# Patient Record
Sex: FEMALE | Race: WHITE | NOT HISPANIC OR LATINO | Employment: OTHER | URBAN - METROPOLITAN AREA
[De-identification: names, ages, dates, MRNs, and addresses within clinical notes are randomized per-mention and may not be internally consistent; named-entity substitution may affect disease eponyms.]

---

## 2017-01-20 ENCOUNTER — APPOINTMENT (OUTPATIENT)
Dept: CT IMAGING | Facility: HOSPITAL | Age: 67
End: 2017-01-20

## 2017-01-20 ENCOUNTER — HOSPITAL ENCOUNTER (EMERGENCY)
Facility: HOSPITAL | Age: 67
Discharge: HOME | End: 2017-01-20
Attending: EMERGENCY MEDICINE | Admitting: EMERGENCY MEDICINE

## 2017-01-20 VITALS
SYSTOLIC BLOOD PRESSURE: 113 MMHG | BODY MASS INDEX: 20.8 KG/M2 | HEIGHT: 62 IN | WEIGHT: 113 LBS | DIASTOLIC BLOOD PRESSURE: 71 MMHG | OXYGEN SATURATION: 97 % | RESPIRATION RATE: 12 BRPM | HEART RATE: 62 BPM | TEMPERATURE: 98.7 F

## 2017-01-20 DIAGNOSIS — R42 VERTIGO: Primary | ICD-10-CM

## 2017-01-20 LAB
ANION GAP SERPL CALCULATED.3IONS-SCNC: 14.6 MMOL/L
BASOPHILS # BLD AUTO: 0.03 10*3/MM3 (ref 0–0.2)
BASOPHILS NFR BLD AUTO: 0.3 % (ref 0–2)
BILIRUB UR QL STRIP: NEGATIVE
BUN BLD-MCNC: 14 MG/DL (ref 8–23)
BUN/CREAT SERPL: 21.2 (ref 7–25)
CALCIUM SPEC-SCNC: 9.6 MG/DL (ref 8.8–10.5)
CHLORIDE SERPL-SCNC: 104 MMOL/L (ref 98–107)
CLARITY UR: CLEAR
CO2 SERPL-SCNC: 22.4 MMOL/L (ref 22–29)
COLOR UR: YELLOW
CREAT BLD-MCNC: 0.66 MG/DL (ref 0.57–1)
DEPRECATED RDW RBC AUTO: 42.5 FL (ref 37–54)
EOSINOPHIL # BLD AUTO: 0.01 10*3/MM3 (ref 0.1–0.3)
EOSINOPHIL NFR BLD AUTO: 0.1 % (ref 0–4)
ERYTHROCYTE [DISTWIDTH] IN BLOOD BY AUTOMATED COUNT: 13 % (ref 11.5–14.5)
GFR SERPL CREATININE-BSD FRML MDRD: 89 ML/MIN/1.73
GLUCOSE BLD-MCNC: 103 MG/DL (ref 65–99)
GLUCOSE UR STRIP-MCNC: NEGATIVE MG/DL
HCT VFR BLD AUTO: 42.4 % (ref 37–47)
HGB BLD-MCNC: 13.7 G/DL (ref 12–16)
HGB UR QL STRIP.AUTO: NEGATIVE
IMM GRANULOCYTES # BLD: 0.02 10*3/MM3 (ref 0–0.03)
IMM GRANULOCYTES NFR BLD: 0.2 % (ref 0–0.5)
KETONES UR QL STRIP: ABNORMAL
LEUKOCYTE ESTERASE UR QL STRIP.AUTO: NEGATIVE
LYMPHOCYTES # BLD AUTO: 1.42 10*3/MM3 (ref 0.6–4.8)
LYMPHOCYTES NFR BLD AUTO: 15.2 % (ref 20–45)
MCH RBC QN AUTO: 28.8 PG (ref 27–31)
MCHC RBC AUTO-ENTMCNC: 32.3 G/DL (ref 31–37)
MCV RBC AUTO: 89.3 FL (ref 81–99)
MONOCYTES # BLD AUTO: 0.43 10*3/MM3 (ref 0–1)
MONOCYTES NFR BLD AUTO: 4.6 % (ref 3–8)
NEUTROPHILS # BLD AUTO: 7.44 10*3/MM3 (ref 1.5–8.3)
NEUTROPHILS NFR BLD AUTO: 79.6 % (ref 45–70)
NITRITE UR QL STRIP: NEGATIVE
NRBC BLD MANUAL-RTO: 0 /100 WBC (ref 0–0)
PH UR STRIP.AUTO: 8.5 [PH] (ref 4.5–8)
PLATELET # BLD AUTO: 238 10*3/MM3 (ref 140–500)
PMV BLD AUTO: 10.9 FL (ref 7.4–10.4)
POTASSIUM BLD-SCNC: 3.7 MMOL/L (ref 3.5–5.2)
PROT UR QL STRIP: NEGATIVE
RBC # BLD AUTO: 4.75 10*6/MM3 (ref 4.2–5.4)
SODIUM BLD-SCNC: 141 MMOL/L (ref 136–145)
SP GR UR STRIP: 1.01 (ref 1–1.03)
UROBILINOGEN UR QL STRIP: ABNORMAL
WBC NRBC COR # BLD: 9.35 10*3/MM3 (ref 4.8–10.8)

## 2017-01-20 PROCEDURE — 99284 EMERGENCY DEPT VISIT MOD MDM: CPT

## 2017-01-20 PROCEDURE — 81003 URINALYSIS AUTO W/O SCOPE: CPT | Performed by: EMERGENCY MEDICINE

## 2017-01-20 PROCEDURE — 25010000002 ONDANSETRON PER 1 MG: Performed by: EMERGENCY MEDICINE

## 2017-01-20 PROCEDURE — 25010000002 PROMETHAZINE PER 50 MG: Performed by: EMERGENCY MEDICINE

## 2017-01-20 PROCEDURE — 99284 EMERGENCY DEPT VISIT MOD MDM: CPT | Performed by: EMERGENCY MEDICINE

## 2017-01-20 PROCEDURE — 96374 THER/PROPH/DIAG INJ IV PUSH: CPT

## 2017-01-20 PROCEDURE — 70450 CT HEAD/BRAIN W/O DYE: CPT

## 2017-01-20 PROCEDURE — 80048 BASIC METABOLIC PNL TOTAL CA: CPT | Performed by: EMERGENCY MEDICINE

## 2017-01-20 PROCEDURE — 96375 TX/PRO/DX INJ NEW DRUG ADDON: CPT

## 2017-01-20 PROCEDURE — 85025 COMPLETE CBC W/AUTO DIFF WBC: CPT | Performed by: EMERGENCY MEDICINE

## 2017-01-20 PROCEDURE — 96361 HYDRATE IV INFUSION ADD-ON: CPT

## 2017-01-20 RX ORDER — LEVOCETIRIZINE DIHYDROCHLORIDE 5 MG/1
5 TABLET, FILM COATED ORAL EVERY EVENING
COMMUNITY
End: 2019-12-23

## 2017-01-20 RX ORDER — LEVOTHYROXINE SODIUM 0.05 MG/1
50 TABLET ORAL DAILY
COMMUNITY

## 2017-01-20 RX ORDER — MECLIZINE HYDROCHLORIDE 25 MG/1
TABLET ORAL
Qty: 40 TABLET | Refills: 0 | Status: SHIPPED | OUTPATIENT
Start: 2017-01-20 | End: 2019-12-23

## 2017-01-20 RX ORDER — MECLIZINE HYDROCHLORIDE 25 MG/1
50 TABLET ORAL ONCE
Status: COMPLETED | OUTPATIENT
Start: 2017-01-20 | End: 2017-01-20

## 2017-01-20 RX ORDER — ONDANSETRON 2 MG/ML
4 INJECTION INTRAMUSCULAR; INTRAVENOUS ONCE
Status: COMPLETED | OUTPATIENT
Start: 2017-01-20 | End: 2017-01-20

## 2017-01-20 RX ORDER — PROMETHAZINE HYDROCHLORIDE 25 MG/ML
6.25 INJECTION, SOLUTION INTRAMUSCULAR; INTRAVENOUS ONCE
Status: COMPLETED | OUTPATIENT
Start: 2017-01-20 | End: 2017-01-20

## 2017-01-20 RX ORDER — SODIUM CHLORIDE 0.9 % (FLUSH) 0.9 %
10 SYRINGE (ML) INJECTION AS NEEDED
Status: DISCONTINUED | OUTPATIENT
Start: 2017-01-20 | End: 2017-01-21 | Stop reason: HOSPADM

## 2017-01-20 RX ORDER — PROMETHAZINE HYDROCHLORIDE 12.5 MG/1
12.5 TABLET ORAL EVERY 6 HOURS PRN
Qty: 30 TABLET | Refills: 0 | Status: ON HOLD | OUTPATIENT
Start: 2017-01-20 | End: 2019-04-24 | Stop reason: SDUPTHER

## 2017-01-20 RX ADMIN — ONDANSETRON 4 MG: 2 INJECTION, SOLUTION INTRAMUSCULAR; INTRAVENOUS at 19:36

## 2017-01-20 RX ADMIN — SODIUM CHLORIDE 1000 ML: 900 INJECTION, SOLUTION INTRAVENOUS at 19:35

## 2017-01-20 RX ADMIN — PROMETHAZINE HYDROCHLORIDE 6.25 MG: 25 INJECTION INTRAMUSCULAR; INTRAVENOUS at 20:11

## 2017-01-20 RX ADMIN — MECLIZINE HYDROCHLORIDE 50 MG: 25 TABLET ORAL at 20:45

## 2017-01-21 NOTE — ED PROVIDER NOTES
Subjective   Patient is a 66 y.o. female presenting with dizziness.   History provided by:  Patient  Dizziness   Quality:  Head spinning, room spinning and vertigo  Severity:  Moderate  Onset quality:  Sudden  Timing:  Constant  Progression:  Unchanged  Chronicity:  Recurrent  Context: eye movement and head movement    Context: not with loss of consciousness    Relieved by:  Nothing  Worsened by:  Turning head, movement and eye movement  Ineffective treatments:  Being still (and meclizine)  Associated symptoms: nausea and vomiting    Associated symptoms: no chest pain, no headaches, no hearing loss, no palpitations, no shortness of breath, no syncope, no tinnitus, no vision changes and no weakness      HPI Narrative:Ms. Kennedy is a 6-year-old white female who presents secondary to vertigo.  Patient reports onset 3 this morning.  She began vomiting approximately 5 AM.  She took meclizine 25 mg at 5 AM.  She took a second dose at noon however she vomited shortly after the second dose.  Patient has vomited several times today.  She reports she feels as if both she is spinning and the room is spinning.  Patient has a history of recurrent vertigo.  She is under the care of Dr. Avina-ENT.  No etiology has ever been found for her vertigo.  Patient presents for evaluation.        Review of Systems   Constitutional: Negative.  Negative for appetite change, diaphoresis and fever.   HENT: Negative.  Negative for hearing loss and tinnitus.    Eyes: Negative.    Respiratory: Negative for cough, chest tightness, shortness of breath and wheezing.    Cardiovascular: Negative for chest pain, palpitations, leg swelling and syncope.   Gastrointestinal: Positive for nausea and vomiting.   Genitourinary: Negative.  Negative for difficulty urinating, flank pain, frequency and hematuria.   Musculoskeletal: Negative.    Skin: Negative.  Negative for color change, pallor and rash.   Neurological: Positive for dizziness. Negative for  seizures, syncope, weakness and headaches.   Psychiatric/Behavioral: Negative.  Negative for agitation, behavioral problems and hallucinations.       Past Medical History   Diagnosis Date   • Abnormal vaginal Pap smear 09/10/1999     ascus   • Abnormal vaginal Pap smear 11/06/2000     lgsil   • Disease of thyroid gland    • History of colposcopy 10/11/1999     chronic endocervicitis   • Osteoporosis        Allergies   Allergen Reactions   • Cefdinir Hives       Past Surgical History   Procedure Laterality Date   • Tubal abdominal ligation     • Breast cyst incision and drainage  12/1996, 1997       Family History   Problem Relation Age of Onset   • Cancer Mother        Social History     Social History   • Marital status:      Spouse name: N/A   • Number of children: N/A   • Years of education: N/A     Social History Main Topics   • Smoking status: Never Smoker   • Smokeless tobacco: None   • Alcohol use No   • Drug use: No   • Sexual activity: No     Other Topics Concern   • None     Social History Narrative           Objective   Physical Exam   Constitutional: She is oriented to person, place, and time. She appears well-developed and well-nourished. No distress.   HENT:   Head: Normocephalic and atraumatic.   Right Ear: External ear normal.   Left Ear: External ear normal.   Nose: Nose normal.   Mouth/Throat: Oropharynx is clear and moist.   Eyes: Conjunctivae and EOM are normal. Pupils are equal, round, and reactive to light.   Neck: Normal range of motion. Neck supple.   Cardiovascular: Normal rate, regular rhythm, normal heart sounds and intact distal pulses.  Exam reveals no gallop and no friction rub.    No murmur heard.  Pulmonary/Chest: Effort normal and breath sounds normal.   Abdominal: Soft. Bowel sounds are normal. She exhibits no distension. There is no tenderness.   Musculoskeletal: Normal range of motion.   Neurological: She is alert and oriented to person, place, and time.   Skin: Skin is  warm and dry. She is not diaphoretic.   Psychiatric: She has a normal mood and affect. Her behavior is normal.   Nursing note and vitals reviewed.      Procedures         ED Course  ED Course   Comment By Time   01/20/17  8:44 PM  The patient reports nausea now improved.  She responded well to Phenergan.  No improvement with Zofran.  She is now laying on her right side.  Again this is an improvement from her arrival.  Laboratories are unremarkable.  Patient reports she has never had a CT of her head despite 6-8 episodes of vertigo over the years.  I will obtain one tonight. Alfonso Marcus MD 01/20 2044 01/20/17  11:23 PM  CT is unremarkable.  Patient feeling much better.  Responded well to Phenergan and meclizine.  Will prescribe those for home. Alfonso Marcus MD 01/20 2323 01/20/17  7:17 PM  The patient has worsening of symptoms with eye movement.  This is even more pronounced with movement of her head.  Ordering baseline labs and UA.  Will give Zofran and meclizine.   Alfonso Marcus MD 01/21 0018      Labs Reviewed   BASIC METABOLIC PANEL - Abnormal; Notable for the following:        Result Value    Glucose 103 (*)     All other components within normal limits    Narrative:     GFR Normal >60  Chronic Kidney Disease <60  Kidney Failure <15   CBC WITH AUTO DIFFERENTIAL - Abnormal; Notable for the following:     MPV 10.9 (*)     Neutrophil % 79.6 (*)     Lymphocyte % 15.2 (*)     Eosinophils, Absolute 0.01 (*)     All other components within normal limits   URINALYSIS W/ CULTURE IF INDICATED - Abnormal; Notable for the following:     pH, UA 8.5 (*)     Ketones, UA 40 mg/dL (2+) (*)     All other components within normal limits    Narrative:     Urine microscopic not indicated.   CBC AND DIFFERENTIAL    Narrative:     The following orders were created for panel order CBC & Differential.  Procedure                               Abnormality         Status                     ---------                                -----------         ------                     CBC Auto Differential[[id number]]         Abnormal            Final result                 Please view results for these tests on the individual orders.             MDM  Number of Diagnoses or Management Options  Vertigo: established and worsening     Amount and/or Complexity of Data Reviewed  Clinical lab tests: ordered and reviewed  Tests in the radiology section of CPT®: ordered and reviewed    Risk of Complications, Morbidity, and/or Mortality  Presenting problems: moderate  Diagnostic procedures: moderate  Management options: moderate    Patient Progress  Patient progress: stable      Final diagnoses:   Vertigo            Alfonso Marcus MD  01/21/17 0019

## 2017-01-21 NOTE — ED NOTES
"Patient up to [facility].  States was able to get up without vomiting.  Dizziness is \"just a tad better\".     Gris Montgomery RN  01/20/17 1308    "

## 2017-01-21 NOTE — ED NOTES
Patient sat up at bedside.  No nausea or vomiting.  States is still dizzy but patient is able to open her eyes and move her head without vomiting.  Gait steady when patient walked to wheelchair for discharge.     Gris Montgomery RN  01/21/17 0009

## 2017-01-21 NOTE — DISCHARGE INSTRUCTIONS
Education as directed.  Recommended take meclizine 4 times daily through the weekend.  Decreased frequency Monday if symptoms aren't improved.  Follow-up with Dr. Michael and/or Dr. Avina as above.  Return to ED for worsening symptoms, medical emergencies.

## 2017-05-12 ENCOUNTER — HOSPITAL ENCOUNTER (OUTPATIENT)
Dept: GENERAL RADIOLOGY | Facility: HOSPITAL | Age: 67
Discharge: HOME | End: 2017-05-12
Attending: INTERNAL MEDICINE | Admitting: INTERNAL MEDICINE

## 2017-05-12 ENCOUNTER — TRANSCRIBE ORDERS (OUTPATIENT)
Dept: ADMINISTRATIVE | Facility: HOSPITAL | Age: 67
End: 2017-05-12

## 2017-05-12 DIAGNOSIS — M25.562 LEFT KNEE PAIN, UNSPECIFIED CHRONICITY: ICD-10-CM

## 2017-05-12 DIAGNOSIS — M25.562 LEFT KNEE PAIN, UNSPECIFIED CHRONICITY: Primary | ICD-10-CM

## 2017-05-12 PROCEDURE — 73560 X-RAY EXAM OF KNEE 1 OR 2: CPT

## 2017-09-06 ENCOUNTER — TRANSCRIBE ORDERS (OUTPATIENT)
Dept: ADMINISTRATIVE | Facility: HOSPITAL | Age: 67
End: 2017-09-06

## 2017-09-06 DIAGNOSIS — R10.9 STOMACH ACHE: Primary | ICD-10-CM

## 2017-09-06 DIAGNOSIS — Z12.31 VISIT FOR SCREENING MAMMOGRAM: ICD-10-CM

## 2017-09-15 ENCOUNTER — APPOINTMENT (OUTPATIENT)
Dept: MAMMOGRAPHY | Facility: HOSPITAL | Age: 67
End: 2017-09-15

## 2017-09-15 ENCOUNTER — APPOINTMENT (OUTPATIENT)
Dept: ULTRASOUND IMAGING | Facility: HOSPITAL | Age: 67
End: 2017-09-15

## 2018-03-01 ENCOUNTER — TRANSCRIBE ORDERS (OUTPATIENT)
Dept: ADMINISTRATIVE | Facility: HOSPITAL | Age: 68
End: 2018-03-01

## 2018-03-01 DIAGNOSIS — Z12.31 SCREENING MAMMOGRAM, ENCOUNTER FOR: Primary | ICD-10-CM

## 2018-03-01 DIAGNOSIS — Z78.0 POSTMENOPAUSAL: Primary | ICD-10-CM

## 2018-03-09 ENCOUNTER — OFFICE VISIT (OUTPATIENT)
Dept: ORTHOPEDIC SURGERY | Facility: CLINIC | Age: 68
End: 2018-03-09

## 2018-03-09 VITALS
HEART RATE: 76 BPM | WEIGHT: 120 LBS | HEIGHT: 62 IN | DIASTOLIC BLOOD PRESSURE: 77 MMHG | SYSTOLIC BLOOD PRESSURE: 114 MMHG | BODY MASS INDEX: 22.08 KG/M2

## 2018-03-09 DIAGNOSIS — M17.12 PRIMARY OSTEOARTHRITIS OF LEFT KNEE: Primary | ICD-10-CM

## 2018-03-09 PROCEDURE — 20610 DRAIN/INJ JOINT/BURSA W/O US: CPT | Performed by: ORTHOPAEDIC SURGERY

## 2018-03-09 PROCEDURE — 99204 OFFICE O/P NEW MOD 45 MIN: CPT | Performed by: ORTHOPAEDIC SURGERY

## 2018-03-09 RX ORDER — IBUPROFEN 800 MG/1
TABLET ORAL
COMMUNITY
Start: 2017-11-30 | End: 2018-08-23

## 2018-03-09 RX ORDER — AZELASTINE 1 MG/ML
1 SPRAY, METERED NASAL AS NEEDED
COMMUNITY
Start: 2018-02-16 | End: 2019-12-23

## 2018-03-09 RX ADMIN — TRIAMCINOLONE ACETONIDE 80 MG: 40 INJECTION, SUSPENSION INTRA-ARTICULAR; INTRAMUSCULAR at 10:51

## 2018-03-09 RX ADMIN — LIDOCAINE HYDROCHLORIDE 8 ML: 10 INJECTION, SOLUTION EPIDURAL; INFILTRATION; INTRACAUDAL; PERINEURAL at 10:51

## 2018-03-09 NOTE — PROGRESS NOTES
Subjective:     Patient ID: Nikkie Kennedy is a 67 y.o. female.    Chief Complaint:  Left knee pain  History of Present Illness  Nikkie Kennedy presents to clinic today for evaluation of left knee pain, states the pain is been present for approximately 2 years ago particular worse over the last 6 months.  Denies any specific injury prior to the onset of her pain initially.  Pain at this point time is increase primarily with stair climbing as well as treadmill activities.  She has noted occasional catching sensation as well as moderate intermittent swelling, localizes majority the pain to the medial aspect of her left knee with moderate pain over anterior knee as well.  Minimal to no improvement with use of ibuprofen.  She did have one prior cortisone injection greater than 6 months ago that does help.  Rates pain as a 7 to a 10 out of 10, aching in nature, occasional sharp pain appreciated.  Denies any radiation of her pain, denies associated numbness or tingling to left lower extremity.     Social History     Occupational History   • Not on file.     Social History Main Topics   • Smoking status: Never Smoker   • Smokeless tobacco: Never Used   • Alcohol use No   • Drug use: No   • Sexual activity: No      Past Medical History:   Diagnosis Date   • Abnormal vaginal Pap smear 09/10/1999    ascus   • Abnormal vaginal Pap smear 11/06/2000    lgsil   • Asthma    • Disease of thyroid gland    • History of colposcopy 10/11/1999    chronic endocervicitis   • Osteoporosis      Past Surgical History:   Procedure Laterality Date   • BREAST CYST ASPIRATION Right     20 yrs ago   • BREAST CYST INCISION AND DRAINAGE  12/1996, 1997   • CHOLECYSTECTOMY  03/2016   • TUBAL ABDOMINAL LIGATION     • VARICOSE VEIN SURGERY  2015    Pt states she had varicose veins removed on both legs (2-3 years ago).       Family History   Problem Relation Age of Onset   • Cancer Mother    • Breast cancer Neg Hx          Review of Systems  "  Constitutional: Negative for chills, diaphoresis, fever and unexpected weight change.   HENT: Positive for hearing loss. Negative for nosebleeds, sore throat and tinnitus.    Eyes: Negative for pain and visual disturbance.   Respiratory: Negative for cough, shortness of breath and wheezing.    Cardiovascular: Negative for chest pain and palpitations.   Gastrointestinal: Negative for abdominal pain, diarrhea, nausea and vomiting.   Endocrine: Negative for cold intolerance, heat intolerance and polydipsia.   Genitourinary: Negative for difficulty urinating, dysuria and hematuria.   Musculoskeletal: Positive for arthralgias and joint swelling. Negative for myalgias.   Skin: Negative for rash and wound.   Allergic/Immunologic: Positive for environmental allergies.   Neurological: Positive for dizziness. Negative for syncope and numbness.   Hematological: Does not bruise/bleed easily.   Psychiatric/Behavioral: Negative for dysphoric mood and sleep disturbance. The patient is not nervous/anxious.    All other systems reviewed and are negative.          Objective:  Vitals:    03/09/18 0955   BP: 114/77   Pulse: 76   Weight: 54.4 kg (120 lb)   Height: 157.5 cm (62\")     1    03/09/18  0955   Weight: 54.4 kg (120 lb)     Body mass index is 21.95 kg/m².  Physical Exam    Vital signs reviewed.   General: No acute distress.  Eyes: conjunctiva clear; pupils equally round and reactive  ENT: external ears and nose atraumatic; oropharynx clear  CV: no peripheral edema  Resp: normal respiratory effort  Skin: no rashes or wounds; normal turgor  Psych: mood and affect appropriate; recent and remote memory intact       Ortho Exam    Left knee-active range of motion 0-130°, 4+ out of 5 strength on flexion and extension, moderate effusion noted, maximal tenderness palpation along medial joint line as well as medial lateral patellar facet, positive active patellar compression test.  Moderately positive Haley exam with pain, no click. "  Grade 1A Lachman, negative anterior posterior drawer, stable to varus and valgus stress 0 and 30 degrees.  No left hip pain on logroll or Stinchfield exam, negative straight leg raise left lower extremity.  Brisk cap refill all digits, 2+ dorsalis is pulse left foot.  Positive sensation light touch all distributions left foot symmetric to the right.    Imaging:  Review of outside x-rays left knee from May 2017 as well as radiology report indicates moderate medial and patellofemoral compartment joint space narrowing, no significant osteophyte changes noted, no evidence of fracture, dislocation, or subluxation, no evidence of radiopaque intra-articular loose body.    Assessment:       1. Primary osteoarthritis of left knee          Plan:          Discussed treatment options at length with patient at today's visit. As reviewing options at length including not limited to activity modification, physical therapy, bracing, prescription anti-inflammatory medication, and injection, patient decided to proceed with injection today as well as home exercises.  I will follow up in 6 weeks to review for improvement, recommend repeating weightbearing x-rays in our office at that time.    Nikkie Kennedy was in agreement with plan and had all questions answered.     Orders:  Orders Placed This Encounter   Procedures   • Large Joint Arthrocentesis   • Ambulatory Referral to Physical Therapy Evaluate and treat, Ortho       Medications:  No orders of the defined types were placed in this encounter.      Followup:  Return in about 6 weeks (around 4/20/2018).    Nikkie was seen today for pain and edema.    Diagnoses and all orders for this visit:    Primary osteoarthritis of left knee  -     Ambulatory Referral to Physical Therapy Evaluate and treat, Ortho  -     Large Joint Arthrocentesis          Dictated utilizing Dragon dictation     Large Joint Arthrocentesis  Date/Time: 3/9/2018 10:51 AM  Consent given by: patient  Site marked:  site marked  Timeout: Immediately prior to procedure a time out was called to verify the correct patient, procedure, equipment, support staff and site/side marked as required   Supporting Documentation  Indications: pain   Procedure Details  Location: knee - L knee  Preparation: Patient was prepped and draped in the usual sterile fashion  Needle size: 22 G  Approach: anterolateral  Medications administered: 8 mL lidocaine PF 1% 1 %; 80 mg triamcinolone acetonide 40 MG/ML  Patient tolerance: patient tolerated the procedure well with no immediate complications

## 2018-03-14 ENCOUNTER — HOSPITAL ENCOUNTER (OUTPATIENT)
Dept: MAMMOGRAPHY | Facility: HOSPITAL | Age: 68
Discharge: HOME | End: 2018-03-14
Admitting: NURSE PRACTITIONER

## 2018-03-14 ENCOUNTER — APPOINTMENT (OUTPATIENT)
Dept: BONE DENSITY | Facility: HOSPITAL | Age: 68
End: 2018-03-14

## 2018-03-14 DIAGNOSIS — Z78.0 POSTMENOPAUSAL: ICD-10-CM

## 2018-03-14 DIAGNOSIS — Z12.31 SCREENING MAMMOGRAM, ENCOUNTER FOR: ICD-10-CM

## 2018-03-14 PROCEDURE — 77063 BREAST TOMOSYNTHESIS BI: CPT

## 2018-03-14 PROCEDURE — 77080 DXA BONE DENSITY AXIAL: CPT

## 2018-03-14 PROCEDURE — 77067 SCR MAMMO BI INCL CAD: CPT

## 2018-03-22 RX ORDER — TRIAMCINOLONE ACETONIDE 40 MG/ML
80 INJECTION, SUSPENSION INTRA-ARTICULAR; INTRAMUSCULAR
Status: COMPLETED | OUTPATIENT
Start: 2018-03-09 | End: 2018-03-09

## 2018-03-22 RX ORDER — LIDOCAINE HYDROCHLORIDE 10 MG/ML
8 INJECTION, SOLUTION EPIDURAL; INFILTRATION; INTRACAUDAL; PERINEURAL
Status: COMPLETED | OUTPATIENT
Start: 2018-03-09 | End: 2018-03-09

## 2018-04-16 ENCOUNTER — OFFICE VISIT (OUTPATIENT)
Dept: OBSTETRICS AND GYNECOLOGY | Facility: CLINIC | Age: 68
End: 2018-04-16

## 2018-04-16 VITALS
BODY MASS INDEX: 22.63 KG/M2 | SYSTOLIC BLOOD PRESSURE: 122 MMHG | WEIGHT: 123 LBS | HEIGHT: 62 IN | DIASTOLIC BLOOD PRESSURE: 78 MMHG

## 2018-04-16 DIAGNOSIS — M81.8 OTHER OSTEOPOROSIS, UNSPECIFIED PATHOLOGICAL FRACTURE PRESENCE: ICD-10-CM

## 2018-04-16 DIAGNOSIS — Z13.9 SCREENING FOR CONDITION: Primary | ICD-10-CM

## 2018-04-16 DIAGNOSIS — Z01.419 PAP SMEAR, LOW-RISK: ICD-10-CM

## 2018-04-16 DIAGNOSIS — Z11.51 SPECIAL SCREENING EXAMINATION FOR HUMAN PAPILLOMAVIRUS (HPV): ICD-10-CM

## 2018-04-16 DIAGNOSIS — N94.2 VAGINISMUS: ICD-10-CM

## 2018-04-16 DIAGNOSIS — N95.2 VAGINAL ATROPHY: ICD-10-CM

## 2018-04-16 LAB
BILIRUB BLD-MCNC: NEGATIVE MG/DL
CLARITY, POC: CLEAR
COLOR UR: YELLOW
GLUCOSE UR STRIP-MCNC: NEGATIVE MG/DL
KETONES UR QL: NEGATIVE
LEUKOCYTE EST, POC: NEGATIVE
NITRITE UR-MCNC: NEGATIVE MG/ML
PH UR: 5 [PH] (ref 5–8)
PROT UR STRIP-MCNC: NEGATIVE MG/DL
RBC # UR STRIP: NEGATIVE /UL
SP GR UR: 1.02 (ref 1–1.03)
UROBILINOGEN UR QL: NORMAL

## 2018-04-16 PROCEDURE — 99213 OFFICE O/P EST LOW 20 MIN: CPT | Performed by: OBSTETRICS & GYNECOLOGY

## 2018-04-16 PROCEDURE — 81002 URINALYSIS NONAUTO W/O SCOPE: CPT | Performed by: OBSTETRICS & GYNECOLOGY

## 2018-04-16 PROCEDURE — G0101 CA SCREEN;PELVIC/BREAST EXAM: HCPCS | Performed by: OBSTETRICS & GYNECOLOGY

## 2018-04-16 RX ORDER — ALENDRONATE SODIUM 70 MG/1
TABLET ORAL
COMMUNITY
Start: 2018-04-15 | End: 2019-04-09

## 2018-04-16 RX ORDER — NITROFURANTOIN MACROCRYSTALS 100 MG/1
CAPSULE ORAL
COMMUNITY
Start: 2018-03-14 | End: 2018-10-05

## 2018-04-16 NOTE — PROGRESS NOTES
GYN Annual Exam     CC- Here for annual exam.     Nikkie Kennedy is a 67 y.o. female new patient who presents for annual well woman exam. She underwent Menopause in her mid 50s and took HRT x 10 years, none now. She has seen Courtney Zaidi for painful bladder and had bladder instillations and they were helpful. She is not SA due to her 's medical condition. No  VB. She just started on Fosamax for osteoporosis.       OB History      Para Term  AB Living    2 2 0   2    SAB TAB Ectopic Molar Multiple Live Births                 Obstetric Comments    2           Menarche:1-  Menopause:mid 50s  HRT: x 10 years, none now  Current contraception: post menopausal status and tubal ligation  History of abnormal Pap smear: no  History of abnormal mammogram: yes - breast cyst drained  Family history of uterine, colon or ovarian cancer: yes - mom colon age 76  Family history of breast cancer: no  STD's: none    Health Maintenance   Topic Date Due   • TDAP/TD VACCINES (1 - Tdap) 1968   • PNEUMOCOCCAL VACCINES (65+ LOW/MEDIUM RISK) (1 of 2 - PCV13) 2014   • HEPATITIS C SCREENING  2018   • COLONOSCOPY  2018   • ZOSTER VACCINE  2018   • INFLUENZA VACCINE  2018   • MAMMOGRAM  2020   • DXA SCAN  2020       Past Medical History:   Diagnosis Date   • Abnormal vaginal Pap smear 09/10/1999    ascus   • Abnormal vaginal Pap smear 2000    lgsil   • Asthma    • Disease of thyroid gland    • History of colposcopy 10/11/1999    chronic endocervicitis   • Osteoporosis        Past Surgical History:   Procedure Laterality Date   • BREAST CYST ASPIRATION Right     20 yrs ago   • BREAST CYST INCISION AND DRAINAGE  1996,    • CHOLECYSTECTOMY  2016   • TUBAL ABDOMINAL LIGATION     • VARICOSE VEIN SURGERY      Pt states she had varicose veins removed on both legs (2-3 years ago).         Current Outpatient Prescriptions:   •  alendronate (FOSAMAX) 70 MG tablet, ,  Disp: , Rfl:   •  azelastine (ASTELIN) 0.1 % nasal spray, , Disp: , Rfl:   •  estradiol (ESTRACE) 0.1 MG/GM vaginal cream, Insert 1 g into the vagina 2 (Two) Times a Week., Disp: 45 g, Rfl: 6  •  ibuprofen (ADVIL,MOTRIN) 800 MG tablet, , Disp: , Rfl:   •  levocetirizine (XYZAL) 5 MG tablet, Take 5 mg by mouth Every Evening., Disp: , Rfl:   •  levothyroxine (SYNTHROID, LEVOTHROID) 50 MCG tablet, Take 50 mcg by mouth Daily., Disp: , Rfl:   •  meclizine (ANTIVERT) 25 MG tablet, 1-2 tablets every 6 hours when necessary dizziness/vertigo., Disp: 40 tablet, Rfl: 0  •  nitrofurantoin (MACRODANTIN) 100 MG capsule, , Disp: , Rfl:   •  promethazine (PHENERGAN) 12.5 MG tablet, Take 1 tablet by mouth Every 6 (Six) Hours As Needed for nausea or vomiting for up to 30 doses., Disp: 30 tablet, Rfl: 0    Allergies   Allergen Reactions   • Hydrocodone Nausea And Vomiting   • Cefdinir Hives       Social History   Substance Use Topics   • Smoking status: Never Smoker   • Smokeless tobacco: Never Used   • Alcohol use No       Family History   Problem Relation Age of Onset   • Cancer Mother    • Colon cancer Mother 76   • Clotting disorder Other      does not know name   • Deep vein thrombosis Other    • Breast cancer Neg Hx    • Ovarian cancer Neg Hx        Review of Systems   Constitutional: Negative for appetite change, fatigue, fever and unexpected weight change.   Respiratory: Negative for cough and shortness of breath.    Cardiovascular: Negative for chest pain and palpitations.   Gastrointestinal: Negative for abdominal distention, abdominal pain, constipation, diarrhea and nausea.   Endocrine: Negative for cold intolerance and heat intolerance.   Genitourinary: Negative for dyspareunia, dysuria, genital sores, hematuria, menstrual problem, pelvic pain, vaginal bleeding and vaginal discharge.   Musculoskeletal: Positive for joint swelling (L knee pain).   Skin: Negative for color change and rash.   Neurological: Negative for  "headaches.   Hematological: Negative for adenopathy. Does not bruise/bleed easily.   Psychiatric/Behavioral: Negative for dysphoric mood. The patient is not nervous/anxious.        /78   Ht 157.5 cm (62\")   Wt 55.8 kg (123 lb)   BMI 22.50 kg/m²     Physical Exam   Constitutional: She is oriented to person, place, and time. She appears well-developed and well-nourished.   HENT:   Head: Normocephalic and atraumatic.   Neck: Neck supple. No thyromegaly present.   Cardiovascular: Normal rate, regular rhythm and normal heart sounds.    Pulmonary/Chest: Effort normal and breath sounds normal. Right breast exhibits no inverted nipple, no mass, no nipple discharge, no skin change and no tenderness. Left breast exhibits no inverted nipple, no mass, no nipple discharge, no skin change and no tenderness.   Abdominal: Soft. Bowel sounds are normal. She exhibits no distension and no mass. There is no tenderness. There is no rebound and no guarding. No hernia.   Genitourinary:       Pelvic exam was performed with patient supine. There is no rash, tenderness or lesion on the right labia. There is no rash, tenderness or lesion on the left labia. Uterus is not deviated, not enlarged, not fixed and not tender. Cervix exhibits no motion tenderness, no discharge and no friability. Right adnexum displays no mass, no tenderness and no fullness. Left adnexum displays no mass, no tenderness and no fullness. No erythema or bleeding in the vagina. No foreign body in the vagina. No signs of injury around the vagina. No vaginal discharge found.   Genitourinary Comments: Urethral eversion noted  Moderate atrophy noted  LPS noted   Neurological: She is alert and oriented to person, place, and time.   Skin: Skin is warm and dry.   Psychiatric: She has a normal mood and affect. Her behavior is normal. Judgment and thought content normal.   Vitals reviewed.         Assessment/Plan    1) GYN HM: check pap/HPV   SBE demonstrated and " encouraged.  2) STD screening: declines Condoms encouraged.  3) Bone health - Weight bearing exercise, dietary calcium recommendations and vitamin D reviewed.   4) Diet and Exercise discussed  5) Smoking Status: non smoker  6) Social:   7)MMG:  UTD 3/2018 B1  8) DEXA- UTD 3/2018, on Fosamax for osteoporosis  9)C scope- UTD 2016, repeat 5 years  10) Urethral eversion and vaginal atrophy- rec Estrace cream. Patient counseled that vaginal estrogen rings, creams and tablets are available and highly effective at treating local vaginal symptoms such as atrophy and vaginal dryness.  Vaginal estrogen does not cause uterine overgrowth and does NOT require a progestogen to protect the uterus.  Very small amounts of estrogen are absorbed systemically.  For patients with a history of an estrogen dependent cancer such as breast cancer, the decision to use local estrogen for local vaginal symptoms should be made after consultation with her oncologist.  Possible side effects include local irritation or burning and/or vaginal bleeding and should always be reported.    11) Vaginismus- pt asymptomatic, declines PT   Follow up prn and 1 year       Nikkie was seen today for gynecologic exam.    Diagnoses and all orders for this visit:    Screening for condition  -     POC Urinalysis Dipstick    Pap smear, low-risk  -     Pap IG, HPV-hr    Special screening examination for human papillomavirus (HPV)  -     Pap IG, HPV-hr        Jazzmine Reyes MD  4/19/2018  10:40 PM

## 2018-04-19 PROBLEM — M81.0 OSTEOPOROSIS: Status: ACTIVE | Noted: 2018-04-19

## 2018-04-19 PROBLEM — N94.2 VAGINISMUS: Status: ACTIVE | Noted: 2018-04-19

## 2018-04-19 PROBLEM — E03.9 HYPOTHYROIDISM: Status: ACTIVE | Noted: 2018-04-19

## 2018-04-19 PROBLEM — N95.2 VAGINAL ATROPHY: Status: ACTIVE | Noted: 2018-04-19

## 2018-04-19 LAB
CYTOLOGIST CVX/VAG CYTO: NORMAL
CYTOLOGY CVX/VAG DOC THIN PREP: NORMAL
DX ICD CODE: NORMAL
HIV 1 & 2 AB SER-IMP: NORMAL
HPV I/H RISK 1 DNA CVX QL PROBE+SIG AMP: NEGATIVE
OTHER STN SPEC: NORMAL
PATH REPORT.FINAL DX SPEC: NORMAL
STAT OF ADQ CVX/VAG CYTO-IMP: NORMAL

## 2018-04-19 RX ORDER — ESTRADIOL 0.1 MG/G
1 CREAM VAGINAL 2 TIMES WEEKLY
Qty: 45 G | Refills: 6 | Status: SHIPPED | OUTPATIENT
Start: 2018-04-19 | End: 2019-12-23

## 2018-07-27 ENCOUNTER — OFFICE VISIT (OUTPATIENT)
Dept: ORTHOPEDIC SURGERY | Facility: CLINIC | Age: 68
End: 2018-07-27

## 2018-07-27 DIAGNOSIS — M17.12 PRIMARY OSTEOARTHRITIS OF LEFT KNEE: ICD-10-CM

## 2018-07-27 DIAGNOSIS — R52 PAIN: Primary | ICD-10-CM

## 2018-07-27 PROCEDURE — 99213 OFFICE O/P EST LOW 20 MIN: CPT | Performed by: ORTHOPAEDIC SURGERY

## 2018-07-27 PROCEDURE — 73562 X-RAY EXAM OF KNEE 3: CPT | Performed by: ORTHOPAEDIC SURGERY

## 2018-07-27 PROCEDURE — 20610 DRAIN/INJ JOINT/BURSA W/O US: CPT | Performed by: ORTHOPAEDIC SURGERY

## 2018-07-27 RX ORDER — MELOXICAM 7.5 MG/1
7.5 TABLET ORAL DAILY
Qty: 30 TABLET | Refills: 0 | Status: SHIPPED | OUTPATIENT
Start: 2018-07-27 | End: 2018-08-23 | Stop reason: SDUPTHER

## 2018-07-27 RX ORDER — CIPROFLOXACIN 500 MG/1
TABLET, FILM COATED ORAL EVERY 12 HOURS
COMMUNITY
End: 2019-04-09

## 2018-07-27 RX ADMIN — LIDOCAINE HYDROCHLORIDE 8 ML: 10 INJECTION, SOLUTION EPIDURAL; INFILTRATION; INTRACAUDAL; PERINEURAL at 09:27

## 2018-07-27 RX ADMIN — TRIAMCINOLONE ACETONIDE 80 MG: 40 INJECTION, SUSPENSION INTRA-ARTICULAR; INTRAMUSCULAR at 09:27

## 2018-08-06 RX ORDER — LIDOCAINE HYDROCHLORIDE 10 MG/ML
8 INJECTION, SOLUTION EPIDURAL; INFILTRATION; INTRACAUDAL; PERINEURAL
Status: COMPLETED | OUTPATIENT
Start: 2018-07-27 | End: 2018-07-27

## 2018-08-06 RX ORDER — TRIAMCINOLONE ACETONIDE 40 MG/ML
80 INJECTION, SUSPENSION INTRA-ARTICULAR; INTRAMUSCULAR
Status: COMPLETED | OUTPATIENT
Start: 2018-07-27 | End: 2018-07-27

## 2018-08-23 RX ORDER — MELOXICAM 7.5 MG/1
TABLET ORAL
Qty: 30 TABLET | Refills: 0 | Status: SHIPPED | OUTPATIENT
Start: 2018-08-23 | End: 2018-12-10 | Stop reason: SDUPTHER

## 2018-09-21 ENCOUNTER — CLINICAL SUPPORT (OUTPATIENT)
Dept: ORTHOPEDIC SURGERY | Facility: CLINIC | Age: 68
End: 2018-09-21

## 2018-09-21 VITALS — WEIGHT: 120 LBS | BODY MASS INDEX: 22.08 KG/M2 | HEIGHT: 62 IN

## 2018-09-21 DIAGNOSIS — M17.12 PRIMARY OSTEOARTHRITIS OF LEFT KNEE: ICD-10-CM

## 2018-09-21 PROCEDURE — 20610 DRAIN/INJ JOINT/BURSA W/O US: CPT | Performed by: NURSE PRACTITIONER

## 2018-09-21 NOTE — PROGRESS NOTES
Procedure   Large Joint Arthrocentesis  Date/Time: 9/21/2018 10:34 AM  Consent given by: patient  Site marked: site marked  Timeout: Immediately prior to procedure a time out was called to verify the correct patient, procedure, equipment, support staff and site/side marked as required   Supporting Documentation  Indications: pain   Procedure Details  Location: knee - L knee  Preparation: Patient was prepped and draped in the usual sterile fashion  Needle size: 22 G  Approach: anterolateral  Medications administered: 30 mg Hyaluronan 30 MG/2ML  Patient tolerance: patient tolerated the procedure well with no immediate complications      ORTHOVISC INJECTION PROVIDED VIA [facility].    Patient presented today for viscosupplement injection.  This is the first injection for the left knee.  We will see patient back in one week.  We reviewed risks benefits and alternatives of the procedure and patient wished to proceed today and had all questions answered.

## 2018-09-28 ENCOUNTER — CLINICAL SUPPORT (OUTPATIENT)
Dept: ORTHOPEDIC SURGERY | Facility: CLINIC | Age: 68
End: 2018-09-28

## 2018-09-28 DIAGNOSIS — M17.12 PRIMARY OSTEOARTHRITIS OF LEFT KNEE: ICD-10-CM

## 2018-09-28 DIAGNOSIS — R52 PAIN: Primary | ICD-10-CM

## 2018-09-28 PROCEDURE — 20610 DRAIN/INJ JOINT/BURSA W/O US: CPT | Performed by: NURSE PRACTITIONER

## 2018-09-28 NOTE — PROGRESS NOTES
Large Joint Arthrocentesis  Date/Time: 9/28/2018 10:45 AM  Consent given by: patient  Site marked: site marked  Timeout: Immediately prior to procedure a time out was called to verify the correct patient, procedure, equipment, support staff and site/side marked as required   Supporting Documentation  Indications: pain   Procedure Details  Location: knee - L knee  Needle size: 22 G  Medications administered: 30 mg Hyaluronan 30 MG/2ML  Patient tolerance: patient tolerated the procedure well with no immediate complications        Patient presented today for viscosupplement injection.  This is the second injection for the left knee.  We will see patient back in one week.  We reviewed risks benefits and alternatives of the procedure and patient wished to proceed today and had all questions answered.

## 2018-10-05 ENCOUNTER — CLINICAL SUPPORT (OUTPATIENT)
Dept: ORTHOPEDIC SURGERY | Facility: CLINIC | Age: 68
End: 2018-10-05

## 2018-10-05 VITALS — BODY MASS INDEX: 22.08 KG/M2 | WEIGHT: 120 LBS | HEIGHT: 62 IN

## 2018-10-05 DIAGNOSIS — M17.12 PRIMARY OSTEOARTHRITIS OF LEFT KNEE: Primary | ICD-10-CM

## 2018-10-05 PROCEDURE — 20610 DRAIN/INJ JOINT/BURSA W/O US: CPT | Performed by: NURSE PRACTITIONER

## 2018-10-05 RX ORDER — AZITHROMYCIN 250 MG/1
TABLET, FILM COATED ORAL
Qty: 5 TABLET | Refills: 2 | Status: SHIPPED | OUTPATIENT
Start: 2018-10-05 | End: 2019-04-09

## 2018-10-05 RX ORDER — FLUCONAZOLE 150 MG/1
150 TABLET ORAL ONCE
Qty: 1 TABLET | Refills: 1 | Status: SHIPPED | OUTPATIENT
Start: 2018-10-05 | End: 2018-10-05

## 2018-10-05 NOTE — PROGRESS NOTES
Procedure   Large Joint Arthrocentesis  Date/Time: 10/5/2018 10:30 AM  Consent given by: patient  Site marked: site marked  Timeout: Immediately prior to procedure a time out was called to verify the correct patient, procedure, equipment, support staff and site/side marked as required   Supporting Documentation  Indications: pain   Procedure Details  Location: knee - L knee  Preparation: Patient was prepped and draped in the usual sterile fashion  Needle size: 22 G  Approach: anterolateral  Medications administered: 30 mg Hyaluronan 30 MG/2ML  Patient tolerance: patient tolerated the procedure well with no immediate complications      [facility]      Patient presented today for viscosupplement injection.  This is the  injection for the third injection for left knee.  We will see patient back in six weeks.  We reviewed risks benefits and alternatives of the procedure and patient wished to proceed today and had all questions answered.

## 2018-12-10 RX ORDER — MELOXICAM 7.5 MG/1
7.5 TABLET ORAL DAILY
Qty: 30 TABLET | Refills: 0 | Status: SHIPPED | OUTPATIENT
Start: 2018-12-10 | End: 2019-01-07 | Stop reason: SDUPTHER

## 2019-01-07 RX ORDER — MELOXICAM 7.5 MG/1
7.5 TABLET ORAL DAILY
Qty: 30 TABLET | Refills: 0 | Status: SHIPPED | OUTPATIENT
Start: 2019-01-07 | End: 2019-02-05 | Stop reason: SDUPTHER

## 2019-01-22 ENCOUNTER — OFFICE VISIT (OUTPATIENT)
Dept: ORTHOPEDIC SURGERY | Facility: CLINIC | Age: 69
End: 2019-01-22

## 2019-01-22 VITALS — BODY MASS INDEX: 22.31 KG/M2 | WEIGHT: 122 LBS

## 2019-01-22 DIAGNOSIS — M17.12 PRIMARY OSTEOARTHRITIS OF LEFT KNEE: ICD-10-CM

## 2019-01-22 DIAGNOSIS — R52 PAIN: Primary | ICD-10-CM

## 2019-01-22 PROCEDURE — 99214 OFFICE O/P EST MOD 30 MIN: CPT | Performed by: ORTHOPAEDIC SURGERY

## 2019-01-22 PROCEDURE — 20610 DRAIN/INJ JOINT/BURSA W/O US: CPT | Performed by: ORTHOPAEDIC SURGERY

## 2019-01-22 RX ADMIN — LIDOCAINE HYDROCHLORIDE 8 ML: 20 INJECTION, SOLUTION EPIDURAL; INFILTRATION; INTRACAUDAL; PERINEURAL at 15:59

## 2019-01-22 RX ADMIN — TRIAMCINOLONE ACETONIDE 80 MG: 40 INJECTION, SUSPENSION INTRA-ARTICULAR; INTRAMUSCULAR at 15:59

## 2019-01-22 NOTE — PROGRESS NOTES
Subjective:     Patient ID: Nikkie Kennedy is a 68 y.o. female.    Chief Complaint:  Follow-up left knee pain, DJD  History of Present Illness  Nikkie Kennedy returns to clinic today for evaluation of left knee, states that she is continued to have increasing levels of pain with basic activities of daily living, rates pain as 8 and 9 out of 10 and aching in nature, occurring on a daily basis, she did have Visco supplement injection back in October with minimal short-term improvement for approximately 2-3 weeks.  Pain is exacerbated most predominantly with walking and stairs, minimal improvement with anti-inflammatory medications, rest, activity modification, short-term improvement only with cortisone injection.  She does note significant crepitus as well as moderate swelling that does wax and wane.  Denies amanda locking or catching in the knee, denies left hip pain.     Social History     Occupational History   • Not on file   Tobacco Use   • Smoking status: Never Smoker   • Smokeless tobacco: Never Used   Substance and Sexual Activity   • Alcohol use: No   • Drug use: No   • Sexual activity: No     Partners: Male     Birth control/protection: Post-menopausal     Comment:       Past Medical History:   Diagnosis Date   • Abnormal vaginal Pap smear 09/10/1999    ascus   • Abnormal vaginal Pap smear 11/06/2000    lgsil   • Asthma    • Disease of thyroid gland    • History of colposcopy 10/11/1999    chronic endocervicitis   • Osteoporosis      Past Surgical History:   Procedure Laterality Date   • BREAST CYST ASPIRATION Right     20 yrs ago   • BREAST CYST INCISION AND DRAINAGE  12/1996, 1997   • CHOLECYSTECTOMY  03/2016   • TUBAL ABDOMINAL LIGATION     • VARICOSE VEIN SURGERY  2015    Pt states she had varicose veins removed on both legs (2-3 years ago).       Family History   Problem Relation Age of Onset   • Cancer Mother    • Colon cancer Mother 76   • Clotting disorder Other         does not know name   •  Deep vein thrombosis Other    • Breast cancer Neg Hx    • Ovarian cancer Neg Hx          Review of Systems   Constitutional: Negative for chills, diaphoresis, fever and unexpected weight change.   HENT: Negative for hearing loss, nosebleeds, sore throat and tinnitus.    Eyes: Negative for pain and visual disturbance.   Respiratory: Negative for cough, shortness of breath and wheezing.    Cardiovascular: Negative for chest pain and palpitations.   Gastrointestinal: Negative for abdominal pain, diarrhea, nausea and vomiting.   Endocrine: Negative for cold intolerance, heat intolerance and polydipsia.   Genitourinary: Negative for difficulty urinating, dysuria and hematuria.   Musculoskeletal: Positive for arthralgias. Negative for joint swelling and myalgias.   Skin: Negative for rash and wound.   Allergic/Immunologic: Negative for environmental allergies.   Neurological: Negative for dizziness, syncope and numbness.   Hematological: Does not bruise/bleed easily.   Psychiatric/Behavioral: Negative for dysphoric mood and sleep disturbance. The patient is not nervous/anxious.    All other systems reviewed and are negative.          Objective:  Vitals:    01/22/19 1532   Weight: 55.3 kg (122 lb)         01/22/19  1532   Weight: 55.3 kg (122 lb)     Body mass index is 22.31 kg/m².  General: No acute distress.  Resp: normal respiratory effort  Skin: no rashes or wounds; normal turgor  Psych: mood and affect appropriate; recent and remote memory intact          Ortho Exam     Left knee-active range of motion 0-120 degrees, 4+ out of 5 strength in flexion and extension, maximal tenderness along medial joint line as well as medial lateral patellar facet with positive axial compression test, stable to varus valgus stress 0 and 30 degrees.  Imaging:    Assessment:        1. Pain    2. Primary osteoarthritis of left knee           Plan:      Large Joint Arthrocentesis: L knee  Date/Time: 1/22/2019 3:59 PM  Consent given by:  patient  Site marked: site marked  Timeout: Immediately prior to procedure a time out was called to verify the correct patient, procedure, equipment, support staff and site/side marked as required   Supporting Documentation  Indications: pain   Procedure Details  Location: knee - L knee  Needle size: 22 G  Approach: superior  Medications administered: 8 mL lidocaine PF 2% 2 %; 80 mg triamcinolone acetonide 40 MG/ML  Patient tolerance: patient tolerated the procedure well with no immediate complications          Discussed treatment options at length with patient at today's visit.  Given continued pain with activities of daily living despite conservative treatment options including but not limited to cortisone injections, Visco supplement injections, anti-inflammatory medications, home exercise program, and activity modification, patient does wish to proceed with total knee arthroplasty.  However she does need to wait until April to do so, in order to help her pain in the meantime she would like to proceed with intra-articular cortisone injection today.    I reviewed anatomy and a model of a total knee arthroplasty, as well as typical postoperative recovery of 6-12 months before maxia recovery, and possible need for rehabilitation stay after hospitalization. We also discussed risks, benefits, and alternatives of procedure with risks including but not limited to neurovascular damage, bleeding, infection, malalignment, chronic pian, failure of implants, osteolysis, loosening of implants, loss of motion, weakness, stiffness, instability, DVT, pulmonary embolus, death, stroke, complex regional pain syndrome, myocardial infarction, and need for additional procedures. Patient understood all these and had all questions answered before consenting to the procedure. No guarantees were given in regards to results from the surgery. We will have patient medically optimized by their primary care physician and plan on proceeding  with surgery at next available date.     Patient denies any history of DVT or pulmonary embolus, denies any history of cardiac issues, she is not diabetic.    Nikkie Kennedy was in agreement with plan and had all questions answered.     Orders:  Orders Placed This Encounter   Procedures   • Large Joint Arthrocentesis: L knee   • MRSA Screen Culture - Swab, Nares   • Basic metabolic panel   • Protime-INR   • APTT   • Consult Primary Care Physician for Medical Clearance   • Follow anesthesia standing orders.   • Provide instructions to patient regarding NPO status   • Clorhexidine skin prep   • CBC and Differential       Medications:  No orders of the defined types were placed in this encounter.      Followup:  No Follow-up on file.    Nikkie was seen today for follow-up and pain.    Diagnoses and all orders for this visit:    Pain  -     Large Joint Arthrocentesis: L knee    Primary osteoarthritis of left knee  -     Case Request; Standing  -     Consult Primary Care Physician for Medical Clearance  -     acetaminophen (TYLENOL) tablet 975 mg; Take 3 tablets by mouth 1 (One) Time.  -     meloxicam (MOBIC) tablet 15 mg; Take 2 tablets by mouth 1 (One) Time.  -     oxyCODONE (oxyCONTIN) 12 hr tablet 10 mg; Take 1 tablet by mouth 1 (One) Time.  -     bupivacaine liposome (EXPAREL) 1.3 % injection 266 mg; Inject 20 mL as directed 1 (One) Time.  -     CBC and Differential; Future  -     Basic metabolic panel; Future  -     Protime-INR; Future  -     APTT; Future  -     MRSA Screen Culture - Swab, Nares; Future  -     Case Request    Other orders  -     Follow anesthesia standing orders.  -     Provide instructions to patient regarding NPO status  -     Clorhexidine skin prep  -     Follow anesthesia standing orders.; Standing  -     Verify NPO Status; Standing  -     SCD (sequential compression device)- to be placed on patient in Pre-op; Standing  -     Clip operative site; Standing  -     Obtain informed consent (if not  collected inpatient or PAT); Standing  -     Type & Screen; Standing  -     Initiate Observation Status; Standing          Dictated utilizing Dragon dictation

## 2019-01-31 RX ORDER — ACETAMINOPHEN 325 MG/1
1000 TABLET ORAL ONCE
Status: CANCELLED | OUTPATIENT
Start: 2019-01-31 | End: 2019-01-31

## 2019-01-31 RX ORDER — TRIAMCINOLONE ACETONIDE 40 MG/ML
80 INJECTION, SUSPENSION INTRA-ARTICULAR; INTRAMUSCULAR
Status: COMPLETED | OUTPATIENT
Start: 2019-01-22 | End: 2019-01-22

## 2019-01-31 RX ORDER — OXYCODONE HCL 10 MG/1
10 TABLET, FILM COATED, EXTENDED RELEASE ORAL ONCE
Status: CANCELLED | OUTPATIENT
Start: 2019-01-31 | End: 2019-01-31

## 2019-01-31 RX ORDER — LIDOCAINE HYDROCHLORIDE 20 MG/ML
8 INJECTION, SOLUTION EPIDURAL; INFILTRATION; INTRACAUDAL; PERINEURAL
Status: COMPLETED | OUTPATIENT
Start: 2019-01-22 | End: 2019-01-22

## 2019-01-31 RX ORDER — MELOXICAM 7.5 MG/1
15 TABLET ORAL ONCE
Status: CANCELLED | OUTPATIENT
Start: 2019-01-31 | End: 2019-01-31

## 2019-02-05 RX ORDER — MELOXICAM 7.5 MG/1
7.5 TABLET ORAL DAILY
Qty: 90 TABLET | Refills: 0 | Status: SHIPPED | OUTPATIENT
Start: 2019-02-05 | End: 2019-04-24 | Stop reason: HOSPADM

## 2019-04-09 ENCOUNTER — APPOINTMENT (OUTPATIENT)
Dept: PREADMISSION TESTING | Facility: HOSPITAL | Age: 69
End: 2019-04-09

## 2019-04-09 ENCOUNTER — PREP FOR SURGERY (OUTPATIENT)
Dept: OTHER | Facility: HOSPITAL | Age: 69
End: 2019-04-09

## 2019-04-09 VITALS
RESPIRATION RATE: 16 BRPM | OXYGEN SATURATION: 98 % | WEIGHT: 123.8 LBS | BODY MASS INDEX: 22.78 KG/M2 | SYSTOLIC BLOOD PRESSURE: 104 MMHG | DIASTOLIC BLOOD PRESSURE: 72 MMHG | HEART RATE: 88 BPM | HEIGHT: 62 IN

## 2019-04-09 DIAGNOSIS — M17.12 PRIMARY OSTEOARTHRITIS OF LEFT KNEE: ICD-10-CM

## 2019-04-09 DIAGNOSIS — M17.12 PRIMARY OSTEOARTHRITIS OF LEFT KNEE: Primary | ICD-10-CM

## 2019-04-09 LAB
ABO GROUP BLD: NORMAL
ABO GROUP BLD: NORMAL
ANION GAP SERPL CALCULATED.3IONS-SCNC: 8.8 MMOL/L
APTT PPP: 27 SECONDS (ref 24.3–38.1)
BASOPHILS # BLD AUTO: 0.04 10*3/MM3 (ref 0–0.2)
BASOPHILS NFR BLD AUTO: 0.6 % (ref 0–1.5)
BILIRUB UR QL STRIP: NEGATIVE
BLD GP AB SCN SERPL QL: NEGATIVE
BUN BLD-MCNC: 16 MG/DL (ref 8–23)
BUN/CREAT SERPL: 21.9 (ref 7–25)
CALCIUM SPEC-SCNC: 9.7 MG/DL (ref 8.6–10.5)
CHLORIDE SERPL-SCNC: 106 MMOL/L (ref 98–107)
CLARITY UR: CLEAR
CO2 SERPL-SCNC: 27.2 MMOL/L (ref 22–29)
COLOR UR: NORMAL
CREAT BLD-MCNC: 0.73 MG/DL (ref 0.57–1)
DEPRECATED RDW RBC AUTO: 44.9 FL (ref 37–54)
EOSINOPHIL # BLD AUTO: 0.1 10*3/MM3 (ref 0–0.4)
EOSINOPHIL NFR BLD AUTO: 1.5 % (ref 0.3–6.2)
ERYTHROCYTE [DISTWIDTH] IN BLOOD BY AUTOMATED COUNT: 13.1 % (ref 12.3–15.4)
GFR SERPL CREATININE-BSD FRML MDRD: 79 ML/MIN/1.73
GLUCOSE BLD-MCNC: 93 MG/DL (ref 65–99)
GLUCOSE UR STRIP-MCNC: NEGATIVE MG/DL
HCT VFR BLD AUTO: 42.9 % (ref 34–46.6)
HGB BLD-MCNC: 13.6 G/DL (ref 12–15.9)
HGB UR QL STRIP.AUTO: NEGATIVE
IMM GRANULOCYTES # BLD AUTO: 0.01 10*3/MM3 (ref 0–0.05)
IMM GRANULOCYTES NFR BLD AUTO: 0.1 % (ref 0–0.5)
INR PPP: 0.98 (ref 0.9–1.1)
KETONES UR QL STRIP: NEGATIVE
LEUKOCYTE ESTERASE UR QL STRIP.AUTO: NEGATIVE
LYMPHOCYTES # BLD AUTO: 2.19 10*3/MM3 (ref 0.7–3.1)
LYMPHOCYTES NFR BLD AUTO: 32 % (ref 19.6–45.3)
MCH RBC QN AUTO: 29.9 PG (ref 26.6–33)
MCHC RBC AUTO-ENTMCNC: 31.7 G/DL (ref 31.5–35.7)
MCV RBC AUTO: 94.3 FL (ref 79–97)
MONOCYTES # BLD AUTO: 0.6 10*3/MM3 (ref 0.1–0.9)
MONOCYTES NFR BLD AUTO: 8.8 % (ref 5–12)
NEUTROPHILS # BLD AUTO: 3.91 10*3/MM3 (ref 1.4–7)
NEUTROPHILS NFR BLD AUTO: 57 % (ref 42.7–76)
NITRITE UR QL STRIP: NEGATIVE
NRBC BLD AUTO-RTO: 0 /100 WBC (ref 0–0)
PH UR STRIP.AUTO: 6.5 [PH] (ref 4.5–8)
PLATELET # BLD AUTO: 214 10*3/MM3 (ref 140–450)
PMV BLD AUTO: 10.6 FL (ref 6–12)
POTASSIUM BLD-SCNC: 4.5 MMOL/L (ref 3.5–5.2)
PROT UR QL STRIP: NEGATIVE
PROTHROMBIN TIME: 12.7 SECONDS (ref 12.1–15)
RBC # BLD AUTO: 4.55 10*6/MM3 (ref 3.77–5.28)
RH BLD: POSITIVE
RH BLD: POSITIVE
SODIUM BLD-SCNC: 142 MMOL/L (ref 136–145)
SP GR UR STRIP: 1.01 (ref 1–1.03)
T&S EXPIRATION DATE: NORMAL
UROBILINOGEN UR QL STRIP: NORMAL
WBC NRBC COR # BLD: 6.85 10*3/MM3 (ref 3.4–10.8)

## 2019-04-09 PROCEDURE — 93005 ELECTROCARDIOGRAM TRACING: CPT

## 2019-04-09 PROCEDURE — 86900 BLOOD TYPING SEROLOGIC ABO: CPT

## 2019-04-09 PROCEDURE — 87081 CULTURE SCREEN ONLY: CPT | Performed by: ORTHOPAEDIC SURGERY

## 2019-04-09 PROCEDURE — 85025 COMPLETE CBC W/AUTO DIFF WBC: CPT | Performed by: ORTHOPAEDIC SURGERY

## 2019-04-09 PROCEDURE — 86901 BLOOD TYPING SEROLOGIC RH(D): CPT

## 2019-04-09 PROCEDURE — 86850 RBC ANTIBODY SCREEN: CPT | Performed by: ORTHOPAEDIC SURGERY

## 2019-04-09 PROCEDURE — 36415 COLL VENOUS BLD VENIPUNCTURE: CPT

## 2019-04-09 PROCEDURE — 93010 ELECTROCARDIOGRAM REPORT: CPT | Performed by: INTERNAL MEDICINE

## 2019-04-09 PROCEDURE — 85610 PROTHROMBIN TIME: CPT | Performed by: ORTHOPAEDIC SURGERY

## 2019-04-09 PROCEDURE — 81003 URINALYSIS AUTO W/O SCOPE: CPT | Performed by: ORTHOPAEDIC SURGERY

## 2019-04-09 PROCEDURE — 86900 BLOOD TYPING SEROLOGIC ABO: CPT | Performed by: ORTHOPAEDIC SURGERY

## 2019-04-09 PROCEDURE — 80048 BASIC METABOLIC PNL TOTAL CA: CPT | Performed by: ORTHOPAEDIC SURGERY

## 2019-04-09 PROCEDURE — 85730 THROMBOPLASTIN TIME PARTIAL: CPT | Performed by: ORTHOPAEDIC SURGERY

## 2019-04-09 PROCEDURE — 86901 BLOOD TYPING SEROLOGIC RH(D): CPT | Performed by: ORTHOPAEDIC SURGERY

## 2019-04-09 RX ORDER — MULTIPLE VITAMINS W/ MINERALS TAB 9MG-400MCG
1 TAB ORAL DAILY
COMMUNITY
End: 2019-12-23

## 2019-04-09 NOTE — DISCHARGE INSTRUCTIONS
PRE-ADMISSION TESTING INSTRUCTIONS FOR TOTAL JOINT PATIENTS    Take these medications the morning of surgery with a small sip of water:  Levothyroxine      No aspirin, advil, aleve, ibuprofen, naproxen, diet pills, decongestants, or vitamin/herbal supplements for a week prior to your surgery.    Do not take any insulin or diabetes medications the morning of surgery.      Start your Bactroban ointment on _____4/12/2019______.  You will need to apply the ointment with a clean Q-tip 3 times a day in both sides of your nose for 5 days and the morning of surgery.    General Instructions:    • Do not eat solid food after midnight the night before surgery.  No gum, mints, or hard candy after midnight the night before surgery.  • You may drink clear liquids the day of surgery up until 2 hours before your arrival time.  • Clear liquids are liquids you can see through. Nothing RED in color.    Plain water    Sports drinks  Sodas     Gelatin (Jell-O)  Fruit juices without pulp such as white grape juice and apple juice  Popsicles that contain no fruit or yogurt  Tea or coffee (no cream or milk added)    • It is beneficial for you to have a clear drink that contains carbohydrates just before you leave your house and before your fasting time begins.  We suggest a 20 ounce bottle of Gatorade or Powerade for non-diabetic patients or a 20 ounce bottle of G2 or Powerade Zero for diabetic patients.     • Patients who avoid smoking, chewing tobacco and alcohol for 4 weeks prior to surgery have a reduced risk of post-operative complications.  If at all possible, quit smoking as many days before surgery as you can.    • Do not smoke, use chewing tobacco or drink alcohol after midnight the day of surgery.    • Bring your C-PAP/ BI-PAP machine if you use one.  • Wear clean comfortable clothes and socks.  • Do not wear contact lenses, lotion, deodorant, or make-up.  Bring a case for your glasses if applicable.   • You may brush your teeth  the morning of surgery.  • You may wear dentures/partials, do not put adhesive/glue on them.  • Bring crutches or walker if applicable.  • Leave all other jewelry and valuables at home.  • NOTIFY YOUR SURGEON IF YOU BECOME ILL, HAVE A FEVER, PRODUCTIVE COUGH, OR CANNOT BE HERE THE DAY OF SURGERY.      Preventing a Surgical Site Infection:    • Shower the night before and on the morning of surgery using the chlorhexidine soap you were given.  Use a clean washcloth with the soap.  Place clean sheets on your bed after showering the night before surgery. Do not use the CHG soap on your hair, face, or private areas. Wash your body gently for five (5) minutes. Do not scrub your skin.  Dry with a clean towel and dress in clean clothing.    • Do not shave the surgical area for 10 days-2 weeks prior to surgery  because the razor can irritate skin and make it easier to develop an infection.  • Make sure you, your family, and all healthcare providers clean their hands with soap and water or an alcohol based hand  before caring for you or your wound.      Day of surgery:    Your surgeon’s office will advise you of your arrival time for the day of surgery.    Upon arrival, a Pre-op nurse and Anesthesia provider will review your health history, obtain vital signs, and answer questions you may have.  The only belongings needed at this time will be your home medications and if applicable your C-PAP/BI-PAP machine.  If you are staying overnight your family can leave the rest of your belongings in the car and bring them to your room later.  A Pre-op nurse will start an IV and you may receive medication in preparation for surgery, including something to help you relax.  Your family will be able to see you in the Pre-op area.  While you are in surgery your family should notify the waiting room  if they leave the waiting room area and provide a contact phone number.    If you have any questions, you can call the  Pre-Admission Department at [phone] or your surgeon's office.    Please be aware that surgery does come with discomfort.  We want to make every effort to control your discomfort so please discuss any uncontrolled symptoms with your nurse.   Your doctor will most likely have prescribed pain medications.     You may have bruising or discomfort from the tourniquet used in surgery.     Please leave all luggage in the car the morning of surgery.  You will be transported to your hospital room following the recovery period.  Your family can get your luggage at that time.      You may receive a survey regarding the care you received. Your feedback is very important and will be used to collect the necessary data to help us to continue to provide excellent care.

## 2019-04-09 NOTE — PAT
Pt here for PAT/Joint Camp visit.  Pre-op tests completed, chg soap given, and instructions reviewed.  Instructed clears until 2 hrs prior to arrival time, voiced understanding.  Seeing PCP for clearance for surgery.

## 2019-04-11 ENCOUNTER — OFFICE VISIT (OUTPATIENT)
Dept: ORTHOPEDIC SURGERY | Facility: CLINIC | Age: 69
End: 2019-04-11

## 2019-04-11 VITALS — HEIGHT: 62 IN | WEIGHT: 123 LBS | BODY MASS INDEX: 22.63 KG/M2

## 2019-04-11 DIAGNOSIS — M17.12 PRIMARY OSTEOARTHRITIS OF LEFT KNEE: Primary | ICD-10-CM

## 2019-04-11 LAB — MRSA SPEC QL CULT: NORMAL

## 2019-04-11 PROCEDURE — S0260 H&P FOR SURGERY: HCPCS | Performed by: ORTHOPAEDIC SURGERY

## 2019-04-11 ASSESSMENT — KOOS JR
KOOS JR SCORE: 47.487
KOOS JR SCORE: 16

## 2019-04-11 NOTE — PROGRESS NOTES
Cc: f/u left knee pain, DJD    Patient presented to clinic today for preoperative history and physical visit in anticipation of upcoming scheduled left total knee arthroplasty.    I reviewed anatomy and a model of a total knee arthroplasty, as well as typical postoperative recovery of 6-12 months before maxia recovery, and possible need for rehabilitation stay after hospitalization. We also discussed risks, benefits, and alternatives of procedure with risks including but not limited to neurovascular damage, bleeding, infection, malalignment, chronic pian, failure of implants, osteolysis, loosening of implants, loss of motion, weakness, stiffness, instability, DVT, pulmonary embolus, death, stroke, complex regional pain syndrome, myocardial infarction, and need for additional procedures. Patient understood all these and had all questions answered before consenting to the procedure. No guarantees were given in regards to results from the surgery.  Patient has been medically optimize by his primary care physician.    Postoperative DVT prophylaxis will be with aspirin     I have given patient prescription today as well for Bactroban for nasal swabs     All remaining questions answered today.

## 2019-04-11 NOTE — H&P (VIEW-ONLY)
History & Physical       Patient: Nikkie Kennedy    YOB: 1950    Medical Record Number: [id number]    Surgeon:  Dr. Dhaval Quinn    Chief Complaints: Left knee pain, DJD    Subjective:  This problem is not new to this examiner.     History of Present Illness: 68 y.o. female presents with left knee pain and end-stage osteoarthritis. Onset of symptoms was years ago and has been progressively worsening despite more conservative treatment measures.  Symptoms are associated with ability to move, exercise, and perform activities of daily living.  Symptoms are aggravated by weight bearing and ROM necessary for activities of daily living.   Symptoms improve with rest, ice and elevation only minimally.      Allergies:   Allergies   Allergen Reactions   • Hydrocodone Nausea And Vomiting   • Codeine Nausea And Vomiting   • Omnicef [Cefdinir] Hives   • Oxycodone Nausea And Vomiting       Medications:   Home Medications:  Current Outpatient Medications on File Prior to Visit   Medication Sig   • ALLERGY SERUM INJECTION Inject  under the skin into the appropriate area as directed Every 7 (Seven) Days.   • azelastine (ASTELIN) 0.1 % nasal spray 1 spray into the nostril(s) as directed by provider As Needed for Allergies.   • CALCIUM PO Take 1 tablet by mouth Daily.   • Chlorcyclizine-Pseudoephed (STAHIST AD) 25-60 MG tablet Take 0.5 tablets by mouth 2 (Two) Times a Day.   • estradiol (ESTRACE) 0.1 MG/GM vaginal cream Insert 1 g into the vagina 2 (Two) Times a Week. (Patient taking differently: Insert 1 g into the vagina 2 (Two) Times a Week. Uses occasionally)   • GLUCOSAMINE-CHONDROITIN PO Take 1 tablet by mouth Daily.   • levocetirizine (XYZAL) 5 MG tablet Take 5 mg by mouth Every Evening.   • levothyroxine (SYNTHROID, LEVOTHROID) 50 MCG tablet Take 50 mcg by mouth Daily.   • meclizine (ANTIVERT) 25 MG tablet 1-2 tablets every 6 hours when necessary dizziness/vertigo. (Patient taking differently: Take 25 mg by  mouth. 1-2 tablets every 6 hours when necessary dizziness/vertigo.)   • meloxicam (MOBIC) 7.5 MG tablet Take 1 tablet by mouth Daily.   • Multiple Vitamins-Minerals (MULTIVITAMIN WITH MINERALS) tablet tablet Take 1 tablet by mouth Daily.   • Omega-3 Fatty Acids (FISH OIL PO) Take 1 capsule by mouth Daily.   • Probiotic Product (PROBIOTIC PO) Take 1 capsule by mouth Daily.   • promethazine (PHENERGAN) 12.5 MG tablet Take 1 tablet by mouth Every 6 (Six) Hours As Needed for nausea or vomiting for up to 30 doses.     No current facility-administered medications on file prior to visit.      Current Medications:  Scheduled Meds:  Continuous Infusions:  No current facility-administered medications for this visit.   PRN Meds:.    I have reviewed the patient's medical history in detail and updated the computerized patient record.  Review and summarization of old records include:    Past Medical History:   Diagnosis Date   • Abnormal vaginal Pap smear 09/10/1999    ascus   • Abnormal vaginal Pap smear 11/06/2000    lgsil   • Arthritis    • Asthma     h/o, no inhaler   • Disease of thyroid gland     hypothyroid   • DJD (degenerative joint disease) of knee     sched TKA left   • History of colposcopy 10/11/1999    chronic endocervicitis   • History of UTI    • Hole in the ear drum, right     d/t tube   • Osteoporosis    • Spinal headache         Past Surgical History:   Procedure Laterality Date   • BREAST CYST ASPIRATION Right     20 yrs ago   • BREAST CYST INCISION AND DRAINAGE  12/1996, 1997   • CHOLECYSTECTOMY  03/2016   • COLONOSCOPY     • EAR TUBES     • TUBAL ABDOMINAL LIGATION     • VARICOSE VEIN SURGERY  2015    Pt states she had varicose veins removed on both legs (2-3 years ago).        Social History     Occupational History   • Not on file   Tobacco Use   • Smoking status: Never Smoker   • Smokeless tobacco: Never Used   Substance and Sexual Activity   • Alcohol use: No   • Drug use: No   • Sexual activity: No      Partners: Male     Birth control/protection: Post-menopausal     Comment:     Social History     Social History Narrative   • Not on file        Family History   Problem Relation Age of Onset   • Cancer Mother    • Colon cancer Mother 76   • Clotting disorder Other         does not know name   • Deep vein thrombosis Other    • Breast cancer Neg Hx    • Ovarian cancer Neg Hx    • Malig Hyperthermia Neg Hx        ROS: 14 point review of systems was performed and was negative except for documented findings in HPI and today's encounter.     Allergies:   Allergies   Allergen Reactions   • Hydrocodone Nausea And Vomiting   • Codeine Nausea And Vomiting   • Omnicef [Cefdinir] Hives   • Oxycodone Nausea And Vomiting     Constitutional:  Denies fever, shaking or chills   Eyes:  Denies change in visual acuity   HENT:  Denies nasal congestion or sore throat   Respiratory:  Denies cough or shortness of breath   Cardiovascular:  Denies chest pain or severe LE edema   GI:  Denies abdominal pain, nausea, vomiting, bloody stools or diarrhea   Musculoskeletal:  Denies numbness tingling or loss of motor function except as outlined above in history of present illness.  : Denies painful urination or hematuria  Integument:  Denies rash, lesion or ulceration   Neurologic:  Denies headache or focal weakness  Endocrine:  Denies lymphadenopathy  Psych:  Denies confusion or change in mental status   Hem:  Denies active bleeding    Physical Exam: 68 y.o. female  There is no height or weight on file to calculate BMI.  There were no vitals filed for this visit.  Vital signs reviewed.   General Appearance:    Alert, cooperative, in no acute distress                  Eyes: conjunctiva clear  ENT: external ears and nose atraumatic  CV: no peripheral edema  Resp: normal respiratory effort  Skin: no rashes or wounds; normal turgor  Psych: mood and affect appropriate  Lymph: no nodes appreciated  Neuro: gross sensation intact  Vascular:   Palpable peripheral pulse in noted extremity  Musculoskeletal Extremities: Left knee-active range of motion 0-120 degrees, 4+ out of 5 strength in flexion and extension, maximal tenderness along medial joint line as well as medial lateral patellar facet with positive axial compression test, stable to varus valgus stress 0 and 30 degrees.    Debilities/Disabilities Identified: None      Diagnostic Tests:  Appointment on 04/09/2019   Component Date Value Ref Range Status   • Color, UA 04/09/2019 Straw  Yellow, Straw Final   • Appearance, UA 04/09/2019 Clear  Clear Final   • pH, UA 04/09/2019 6.5  4.5 - 8.0 Final   • Specific Gravity, UA 04/09/2019 1.015  1.003 - 1.030 Final   • Glucose, UA 04/09/2019 Negative  Negative Final   • Ketones, UA 04/09/2019 Negative  Negative Final   • Bilirubin, UA 04/09/2019 Negative  Negative Final   • Blood, UA 04/09/2019 Negative  Negative Final   • Protein, UA 04/09/2019 Negative  Negative Final   • Leuk Esterase, UA 04/09/2019 Negative  Negative Final   • Nitrite, UA 04/09/2019 Negative  Negative Final   • Urobilinogen, UA 04/09/2019 0.2 E.U./dL  0.2 - 1.0 E.U./dL Final   • ABO Type 04/09/2019 O   Final   • RH type 04/09/2019 Positive   Final   Appointment on 04/09/2019   Component Date Value Ref Range Status   • MRSA SCREEN CX 04/09/2019 No Methicillin Resistant Staphylococcus aureus isolated   Preliminary   • PTT 04/09/2019 27.0  24.3 - 38.1 seconds Final   • Protime 04/09/2019 12.7  12.1 - 15.0 Seconds Final   • INR 04/09/2019 0.98  0.90 - 1.10 Final   • Glucose 04/09/2019 93  65 - 99 mg/dL Final   • BUN 04/09/2019 16  8 - 23 mg/dL Final   • Creatinine 04/09/2019 0.73  0.57 - 1.00 mg/dL Final   • Sodium 04/09/2019 142  136 - 145 mmol/L Final   • Potassium 04/09/2019 4.5  3.5 - 5.2 mmol/L Final   • Chloride 04/09/2019 106  98 - 107 mmol/L Final   • CO2 04/09/2019 27.2  22.0 - 29.0 mmol/L Final   • Calcium 04/09/2019 9.7  8.6 - 10.5 mg/dL Final   • eGFR Non African Amer 04/09/2019  79  >60 mL/min/1.73 Final   • BUN/Creatinine Ratio 04/09/2019 21.9  7.0 - 25.0 Final   • Anion Gap 04/09/2019 8.8  mmol/L Final   • ABO Type 04/09/2019 O   Final   • RH type 04/09/2019 Positive   Final   • Antibody Screen 04/09/2019 Negative   Final   • T&S Expiration Date 04/09/2019 4/23/2019 11:59:00 PM   Final   • WBC 04/09/2019 6.85  3.40 - 10.80 10*3/mm3 Final   • RBC 04/09/2019 4.55  3.77 - 5.28 10*6/mm3 Final   • Hemoglobin 04/09/2019 13.6  12.0 - 15.9 g/dL Final   • Hematocrit 04/09/2019 42.9  34.0 - 46.6 % Final   • MCV 04/09/2019 94.3  79.0 - 97.0 fL Final   • MCH 04/09/2019 29.9  26.6 - 33.0 pg Final   • MCHC 04/09/2019 31.7  31.5 - 35.7 g/dL Final   • RDW 04/09/2019 13.1  12.3 - 15.4 % Final   • RDW-SD 04/09/2019 44.9  37.0 - 54.0 fl Final   • MPV 04/09/2019 10.6  6.0 - 12.0 fL Final   • Platelets 04/09/2019 214  140 - 450 10*3/mm3 Final   • Neutrophil % 04/09/2019 57.0  42.7 - 76.0 % Final   • Lymphocyte % 04/09/2019 32.0  19.6 - 45.3 % Final   • Monocyte % 04/09/2019 8.8  5.0 - 12.0 % Final   • Eosinophil % 04/09/2019 1.5  0.3 - 6.2 % Final   • Basophil % 04/09/2019 0.6  0.0 - 1.5 % Final   • Immature Grans % 04/09/2019 0.1  0.0 - 0.5 % Final   • Neutrophils, Absolute 04/09/2019 3.91  1.40 - 7.00 10*3/mm3 Final   • Lymphocytes, Absolute 04/09/2019 2.19  0.70 - 3.10 10*3/mm3 Final   • Monocytes, Absolute 04/09/2019 0.60  0.10 - 0.90 10*3/mm3 Final   • Eosinophils, Absolute 04/09/2019 0.10  0.00 - 0.40 10*3/mm3 Final   • Basophils, Absolute 04/09/2019 0.04  0.00 - 0.20 10*3/mm3 Final   • Immature Grans, Absolute 04/09/2019 0.01  0.00 - 0.05 10*3/mm3 Final   • nRBC 04/09/2019 0.0  0.0 - 0.0 /100 WBC Final     No results found.    Assessment:  Patient Active Problem List   Diagnosis   • Primary osteoarthritis of left knee   • Hypothyroidism   • Osteoporosis   • Vaginismus   • Vaginal atrophy       Plan:  I reviewed anatomy of a total joint arthroplasty in laymen's terms, as well as typical postoperative  recovery and possibly 6-12 months for maximal recovery, and possible need for rehabilitation stay after hospitalization. We also discussed risks, benefits, alternatives, and limitations of procedure with risks including but not limited to neurovascular damage, bleeding, infection, malalignment, chronic pian, failure of implants, osteolysis, loosening of implants, loss of motion, weakness, stiffness, instability, DVT, pulmonary embolus, death, stroke, complex regional pain syndrome, myocardial infarction, and need for additional procedures. No guarantees were given regarding results of surgery.      Nikkie Kennedy was given the opportunity to ask and have all questions answered today.  The patient voiced understanding of the risks, benefits, and alternative forms of treatment that were discussed and the patient consents to proceed with surgery.     Patient's blood clot history is negative.    Plan for DVT prophylaxis is ASA    Patient's MRSA infection history is negative.    Patient's skin infection history is negative.    Discharge Plan: POD 2-3 to home    Date: 4/11/2019    Dictated utilizing Dragon dictation

## 2019-04-11 NOTE — H&P
History & Physical       Patient: Nikkie Kennedy    YOB: 1950    Medical Record Number: [id number]    Surgeon:  Dr. Dhaval Quinn    Chief Complaints: Left knee pain, DJD    Subjective:  This problem is not new to this examiner.     History of Present Illness: 68 y.o. female presents with left knee pain and end-stage osteoarthritis. Onset of symptoms was years ago and has been progressively worsening despite more conservative treatment measures.  Symptoms are associated with ability to move, exercise, and perform activities of daily living.  Symptoms are aggravated by weight bearing and ROM necessary for activities of daily living.   Symptoms improve with rest, ice and elevation only minimally.      Allergies:   Allergies   Allergen Reactions   • Hydrocodone Nausea And Vomiting   • Codeine Nausea And Vomiting   • Omnicef [Cefdinir] Hives   • Oxycodone Nausea And Vomiting       Medications:   Home Medications:  Current Outpatient Medications on File Prior to Visit   Medication Sig   • ALLERGY SERUM INJECTION Inject  under the skin into the appropriate area as directed Every 7 (Seven) Days.   • azelastine (ASTELIN) 0.1 % nasal spray 1 spray into the nostril(s) as directed by provider As Needed for Allergies.   • CALCIUM PO Take 1 tablet by mouth Daily.   • Chlorcyclizine-Pseudoephed (STAHIST AD) 25-60 MG tablet Take 0.5 tablets by mouth 2 (Two) Times a Day.   • estradiol (ESTRACE) 0.1 MG/GM vaginal cream Insert 1 g into the vagina 2 (Two) Times a Week. (Patient taking differently: Insert 1 g into the vagina 2 (Two) Times a Week. Uses occasionally)   • GLUCOSAMINE-CHONDROITIN PO Take 1 tablet by mouth Daily.   • levocetirizine (XYZAL) 5 MG tablet Take 5 mg by mouth Every Evening.   • levothyroxine (SYNTHROID, LEVOTHROID) 50 MCG tablet Take 50 mcg by mouth Daily.   • meclizine (ANTIVERT) 25 MG tablet 1-2 tablets every 6 hours when necessary dizziness/vertigo. (Patient taking differently: Take 25 mg by  mouth. 1-2 tablets every 6 hours when necessary dizziness/vertigo.)   • meloxicam (MOBIC) 7.5 MG tablet Take 1 tablet by mouth Daily.   • Multiple Vitamins-Minerals (MULTIVITAMIN WITH MINERALS) tablet tablet Take 1 tablet by mouth Daily.   • Omega-3 Fatty Acids (FISH OIL PO) Take 1 capsule by mouth Daily.   • Probiotic Product (PROBIOTIC PO) Take 1 capsule by mouth Daily.   • promethazine (PHENERGAN) 12.5 MG tablet Take 1 tablet by mouth Every 6 (Six) Hours As Needed for nausea or vomiting for up to 30 doses.     No current facility-administered medications on file prior to visit.      Current Medications:  Scheduled Meds:  Continuous Infusions:  No current facility-administered medications for this visit.   PRN Meds:.    I have reviewed the patient's medical history in detail and updated the computerized patient record.  Review and summarization of old records include:    Past Medical History:   Diagnosis Date   • Abnormal vaginal Pap smear 09/10/1999    ascus   • Abnormal vaginal Pap smear 11/06/2000    lgsil   • Arthritis    • Asthma     h/o, no inhaler   • Disease of thyroid gland     hypothyroid   • DJD (degenerative joint disease) of knee     sched TKA left   • History of colposcopy 10/11/1999    chronic endocervicitis   • History of UTI    • Hole in the ear drum, right     d/t tube   • Osteoporosis    • Spinal headache         Past Surgical History:   Procedure Laterality Date   • BREAST CYST ASPIRATION Right     20 yrs ago   • BREAST CYST INCISION AND DRAINAGE  12/1996, 1997   • CHOLECYSTECTOMY  03/2016   • COLONOSCOPY     • EAR TUBES     • TUBAL ABDOMINAL LIGATION     • VARICOSE VEIN SURGERY  2015    Pt states she had varicose veins removed on both legs (2-3 years ago).        Social History     Occupational History   • Not on file   Tobacco Use   • Smoking status: Never Smoker   • Smokeless tobacco: Never Used   Substance and Sexual Activity   • Alcohol use: No   • Drug use: No   • Sexual activity: No      Partners: Male     Birth control/protection: Post-menopausal     Comment:     Social History     Social History Narrative   • Not on file        Family History   Problem Relation Age of Onset   • Cancer Mother    • Colon cancer Mother 76   • Clotting disorder Other         does not know name   • Deep vein thrombosis Other    • Breast cancer Neg Hx    • Ovarian cancer Neg Hx    • Malig Hyperthermia Neg Hx        ROS: 14 point review of systems was performed and was negative except for documented findings in HPI and today's encounter.     Allergies:   Allergies   Allergen Reactions   • Hydrocodone Nausea And Vomiting   • Codeine Nausea And Vomiting   • Omnicef [Cefdinir] Hives   • Oxycodone Nausea And Vomiting     Constitutional:  Denies fever, shaking or chills   Eyes:  Denies change in visual acuity   HENT:  Denies nasal congestion or sore throat   Respiratory:  Denies cough or shortness of breath   Cardiovascular:  Denies chest pain or severe LE edema   GI:  Denies abdominal pain, nausea, vomiting, bloody stools or diarrhea   Musculoskeletal:  Denies numbness tingling or loss of motor function except as outlined above in history of present illness.  : Denies painful urination or hematuria  Integument:  Denies rash, lesion or ulceration   Neurologic:  Denies headache or focal weakness  Endocrine:  Denies lymphadenopathy  Psych:  Denies confusion or change in mental status   Hem:  Denies active bleeding    Physical Exam: 68 y.o. female  There is no height or weight on file to calculate BMI.  There were no vitals filed for this visit.  Vital signs reviewed.   General Appearance:    Alert, cooperative, in no acute distress                  Eyes: conjunctiva clear  ENT: external ears and nose atraumatic  CV: no peripheral edema  Resp: normal respiratory effort  Skin: no rashes or wounds; normal turgor  Psych: mood and affect appropriate  Lymph: no nodes appreciated  Neuro: gross sensation intact  Vascular:   Palpable peripheral pulse in noted extremity  Musculoskeletal Extremities: Left knee-active range of motion 0-120 degrees, 4+ out of 5 strength in flexion and extension, maximal tenderness along medial joint line as well as medial lateral patellar facet with positive axial compression test, stable to varus valgus stress 0 and 30 degrees.    Debilities/Disabilities Identified: None      Diagnostic Tests:  Appointment on 04/09/2019   Component Date Value Ref Range Status   • Color, UA 04/09/2019 Straw  Yellow, Straw Final   • Appearance, UA 04/09/2019 Clear  Clear Final   • pH, UA 04/09/2019 6.5  4.5 - 8.0 Final   • Specific Gravity, UA 04/09/2019 1.015  1.003 - 1.030 Final   • Glucose, UA 04/09/2019 Negative  Negative Final   • Ketones, UA 04/09/2019 Negative  Negative Final   • Bilirubin, UA 04/09/2019 Negative  Negative Final   • Blood, UA 04/09/2019 Negative  Negative Final   • Protein, UA 04/09/2019 Negative  Negative Final   • Leuk Esterase, UA 04/09/2019 Negative  Negative Final   • Nitrite, UA 04/09/2019 Negative  Negative Final   • Urobilinogen, UA 04/09/2019 0.2 E.U./dL  0.2 - 1.0 E.U./dL Final   • ABO Type 04/09/2019 O   Final   • RH type 04/09/2019 Positive   Final   Appointment on 04/09/2019   Component Date Value Ref Range Status   • MRSA SCREEN CX 04/09/2019 No Methicillin Resistant Staphylococcus aureus isolated   Preliminary   • PTT 04/09/2019 27.0  24.3 - 38.1 seconds Final   • Protime 04/09/2019 12.7  12.1 - 15.0 Seconds Final   • INR 04/09/2019 0.98  0.90 - 1.10 Final   • Glucose 04/09/2019 93  65 - 99 mg/dL Final   • BUN 04/09/2019 16  8 - 23 mg/dL Final   • Creatinine 04/09/2019 0.73  0.57 - 1.00 mg/dL Final   • Sodium 04/09/2019 142  136 - 145 mmol/L Final   • Potassium 04/09/2019 4.5  3.5 - 5.2 mmol/L Final   • Chloride 04/09/2019 106  98 - 107 mmol/L Final   • CO2 04/09/2019 27.2  22.0 - 29.0 mmol/L Final   • Calcium 04/09/2019 9.7  8.6 - 10.5 mg/dL Final   • eGFR Non African Amer 04/09/2019  79  >60 mL/min/1.73 Final   • BUN/Creatinine Ratio 04/09/2019 21.9  7.0 - 25.0 Final   • Anion Gap 04/09/2019 8.8  mmol/L Final   • ABO Type 04/09/2019 O   Final   • RH type 04/09/2019 Positive   Final   • Antibody Screen 04/09/2019 Negative   Final   • T&S Expiration Date 04/09/2019 4/23/2019 11:59:00 PM   Final   • WBC 04/09/2019 6.85  3.40 - 10.80 10*3/mm3 Final   • RBC 04/09/2019 4.55  3.77 - 5.28 10*6/mm3 Final   • Hemoglobin 04/09/2019 13.6  12.0 - 15.9 g/dL Final   • Hematocrit 04/09/2019 42.9  34.0 - 46.6 % Final   • MCV 04/09/2019 94.3  79.0 - 97.0 fL Final   • MCH 04/09/2019 29.9  26.6 - 33.0 pg Final   • MCHC 04/09/2019 31.7  31.5 - 35.7 g/dL Final   • RDW 04/09/2019 13.1  12.3 - 15.4 % Final   • RDW-SD 04/09/2019 44.9  37.0 - 54.0 fl Final   • MPV 04/09/2019 10.6  6.0 - 12.0 fL Final   • Platelets 04/09/2019 214  140 - 450 10*3/mm3 Final   • Neutrophil % 04/09/2019 57.0  42.7 - 76.0 % Final   • Lymphocyte % 04/09/2019 32.0  19.6 - 45.3 % Final   • Monocyte % 04/09/2019 8.8  5.0 - 12.0 % Final   • Eosinophil % 04/09/2019 1.5  0.3 - 6.2 % Final   • Basophil % 04/09/2019 0.6  0.0 - 1.5 % Final   • Immature Grans % 04/09/2019 0.1  0.0 - 0.5 % Final   • Neutrophils, Absolute 04/09/2019 3.91  1.40 - 7.00 10*3/mm3 Final   • Lymphocytes, Absolute 04/09/2019 2.19  0.70 - 3.10 10*3/mm3 Final   • Monocytes, Absolute 04/09/2019 0.60  0.10 - 0.90 10*3/mm3 Final   • Eosinophils, Absolute 04/09/2019 0.10  0.00 - 0.40 10*3/mm3 Final   • Basophils, Absolute 04/09/2019 0.04  0.00 - 0.20 10*3/mm3 Final   • Immature Grans, Absolute 04/09/2019 0.01  0.00 - 0.05 10*3/mm3 Final   • nRBC 04/09/2019 0.0  0.0 - 0.0 /100 WBC Final     No results found.    Assessment:  Patient Active Problem List   Diagnosis   • Primary osteoarthritis of left knee   • Hypothyroidism   • Osteoporosis   • Vaginismus   • Vaginal atrophy       Plan:  I reviewed anatomy of a total joint arthroplasty in laymen's terms, as well as typical postoperative  recovery and possibly 6-12 months for maximal recovery, and possible need for rehabilitation stay after hospitalization. We also discussed risks, benefits, alternatives, and limitations of procedure with risks including but not limited to neurovascular damage, bleeding, infection, malalignment, chronic pian, failure of implants, osteolysis, loosening of implants, loss of motion, weakness, stiffness, instability, DVT, pulmonary embolus, death, stroke, complex regional pain syndrome, myocardial infarction, and need for additional procedures. No guarantees were given regarding results of surgery.      Nikkie Kennedy was given the opportunity to ask and have all questions answered today.  The patient voiced understanding of the risks, benefits, and alternative forms of treatment that were discussed and the patient consents to proceed with surgery.     Patient's blood clot history is negative.    Plan for DVT prophylaxis is ASA    Patient's MRSA infection history is negative.    Patient's skin infection history is negative.    Discharge Plan: POD 2-3 to home    Date: 4/11/2019    Dictated utilizing Dragon dictation

## 2019-04-16 ENCOUNTER — TELEPHONE (OUTPATIENT)
Dept: ORTHOPEDIC SURGERY | Facility: CLINIC | Age: 69
End: 2019-04-16

## 2019-04-16 ENCOUNTER — PREP FOR SURGERY (OUTPATIENT)
Dept: OTHER | Facility: HOSPITAL | Age: 69
End: 2019-04-16

## 2019-04-16 DIAGNOSIS — M17.12 PRIMARY OSTEOARTHRITIS OF LEFT KNEE: Primary | ICD-10-CM

## 2019-04-16 NOTE — TELEPHONE ENCOUNTER
Patient needs antibiotic orders for surgery. You circled Kefzol on her order sheet, however I am getting an allergy alert when I attempt to order. Would you like to switch this, or order kefzol?

## 2019-04-17 NOTE — PAT
Called pt. to remind of pre-op instructions. Verbalizes understanding. Finished antibiotic on 04/14/2019, and symptom free currently. Medications reviewed.

## 2019-04-19 ENCOUNTER — ANESTHESIA EVENT (OUTPATIENT)
Dept: PERIOP | Facility: HOSPITAL | Age: 69
End: 2019-04-19

## 2019-04-22 ENCOUNTER — HOSPITAL ENCOUNTER (OUTPATIENT)
Facility: HOSPITAL | Age: 69
Setting detail: OBSERVATION
Discharge: HOME | End: 2019-04-24
Attending: ORTHOPAEDIC SURGERY | Admitting: ORTHOPAEDIC SURGERY

## 2019-04-22 ENCOUNTER — APPOINTMENT (OUTPATIENT)
Dept: GENERAL RADIOLOGY | Facility: HOSPITAL | Age: 69
End: 2019-04-22

## 2019-04-22 ENCOUNTER — ANESTHESIA (OUTPATIENT)
Dept: PERIOP | Facility: HOSPITAL | Age: 69
End: 2019-04-22

## 2019-04-22 DIAGNOSIS — Z96.652 STATUS POST TOTAL LEFT KNEE REPLACEMENT: Primary | ICD-10-CM

## 2019-04-22 DIAGNOSIS — M17.12 PRIMARY OSTEOARTHRITIS OF LEFT KNEE: ICD-10-CM

## 2019-04-22 PROCEDURE — C1776 JOINT DEVICE (IMPLANTABLE): HCPCS | Performed by: ORTHOPAEDIC SURGERY

## 2019-04-22 PROCEDURE — 25010000002 PROMETHAZINE PER 50 MG: Performed by: ORTHOPAEDIC SURGERY

## 2019-04-22 PROCEDURE — 25010000002 DIPHENHYDRAMINE PER 50 MG: Performed by: NURSE ANESTHETIST, CERTIFIED REGISTERED

## 2019-04-22 PROCEDURE — 25010000002 ONDANSETRON PER 1 MG: Performed by: ORTHOPAEDIC SURGERY

## 2019-04-22 PROCEDURE — 25010000002 PROPOFOL 10 MG/ML EMULSION: Performed by: NURSE ANESTHETIST, CERTIFIED REGISTERED

## 2019-04-22 PROCEDURE — 25010000002 ROPIVACAINE PER 1 MG: Performed by: NURSE ANESTHETIST, CERTIFIED REGISTERED

## 2019-04-22 PROCEDURE — G0378 HOSPITAL OBSERVATION PER HR: HCPCS

## 2019-04-22 PROCEDURE — 25010000002 VANCOMYCIN 1 G RECONSTITUTED SOLUTION 1 EACH VIAL: Performed by: ORTHOPAEDIC SURGERY

## 2019-04-22 PROCEDURE — 25010000002 DEXAMETHASONE PER 1 MG: Performed by: NURSE ANESTHETIST, CERTIFIED REGISTERED

## 2019-04-22 PROCEDURE — 25010000003 BUPIVACAINE LIPOSOME 1.3 % SUSPENSION 20 ML VIAL: Performed by: ORTHOPAEDIC SURGERY

## 2019-04-22 PROCEDURE — 0396T: CPT | Performed by: ORTHOPAEDIC SURGERY

## 2019-04-22 PROCEDURE — 25010000002 VANCOMYCIN 750 MG RECONSTITUTED SOLUTION 1 EACH VIAL: Performed by: ORTHOPAEDIC SURGERY

## 2019-04-22 PROCEDURE — C1713 ANCHOR/SCREW BN/BN,TIS/BN: HCPCS | Performed by: ORTHOPAEDIC SURGERY

## 2019-04-22 PROCEDURE — 27447 TOTAL KNEE ARTHROPLASTY: CPT | Performed by: ORTHOPAEDIC SURGERY

## 2019-04-22 PROCEDURE — C9290 INJ, BUPIVACAINE LIPOSOME: HCPCS | Performed by: ORTHOPAEDIC SURGERY

## 2019-04-22 PROCEDURE — L1830 KO IMMOB CANVAS LONG PRE OTS: HCPCS | Performed by: ORTHOPAEDIC SURGERY

## 2019-04-22 PROCEDURE — 94799 UNLISTED PULMONARY SVC/PX: CPT

## 2019-04-22 PROCEDURE — 25010000002 ONDANSETRON PER 1 MG: Performed by: NURSE ANESTHETIST, CERTIFIED REGISTERED

## 2019-04-22 PROCEDURE — 73560 X-RAY EXAM OF KNEE 1 OR 2: CPT

## 2019-04-22 PROCEDURE — 25010000002 MORPHINE PER 10 MG: Performed by: ORTHOPAEDIC SURGERY

## 2019-04-22 DEVICE — ART/SRF KN PERSONA/VE PS CD MC 6TO7 11MM LT: Type: IMPLANTABLE DEVICE | Site: KNEE | Status: FUNCTIONAL

## 2019-04-22 DEVICE — CAP TOTL KN CMT PREMIUM: Type: IMPLANTABLE DEVICE | Status: FUNCTIONAL

## 2019-04-22 DEVICE — CAP BEAR KN VE UPCHRG: Type: IMPLANTABLE DEVICE | Status: FUNCTIONAL

## 2019-04-22 DEVICE — PAT KN PERSONA VE CRS/LNK CMT 8.5X32MM: Type: IMPLANTABLE DEVICE | Site: KNEE | Status: FUNCTIONAL

## 2019-04-22 DEVICE — COMP FEM/KN PERSONA CR CMT COCR STD SZ6 LT: Type: IMPLANTABLE DEVICE | Site: KNEE | Status: FUNCTIONAL

## 2019-04-22 DEVICE — STEM TIB/KN PERSONA CMT 5D SZD LT: Type: IMPLANTABLE DEVICE | Site: KNEE | Status: FUNCTIONAL

## 2019-04-22 DEVICE — CAP GUIDE PSI/SIGNATURE/I-ASSIST: Type: IMPLANTABLE DEVICE | Status: FUNCTIONAL

## 2019-04-22 DEVICE — CAP PAT KN VE/TM UPCHRG: Type: IMPLANTABLE DEVICE | Status: FUNCTIONAL

## 2019-04-22 RX ORDER — MIDAZOLAM HYDROCHLORIDE 1 MG/ML
1 INJECTION INTRAMUSCULAR; INTRAVENOUS
Status: DISCONTINUED | OUTPATIENT
Start: 2019-04-22 | End: 2019-04-22 | Stop reason: HOSPADM

## 2019-04-22 RX ORDER — MELOXICAM 7.5 MG/1
7.5 TABLET ORAL DAILY
Status: DISCONTINUED | OUTPATIENT
Start: 2019-04-22 | End: 2019-04-24 | Stop reason: HOSPADM

## 2019-04-22 RX ORDER — BUPIVACAINE HYDROCHLORIDE 5 MG/ML
INJECTION, SOLUTION PERINEURAL
Status: COMPLETED | OUTPATIENT
Start: 2019-04-22 | End: 2019-04-22

## 2019-04-22 RX ORDER — ACETAMINOPHEN 650 MG/1
650 SUPPOSITORY RECTAL ONCE AS NEEDED
Status: DISCONTINUED | OUTPATIENT
Start: 2019-04-22 | End: 2019-04-22 | Stop reason: HOSPADM

## 2019-04-22 RX ORDER — LIDOCAINE HYDROCHLORIDE 10 MG/ML
0.5 INJECTION, SOLUTION EPIDURAL; INFILTRATION; INTRACAUDAL; PERINEURAL ONCE AS NEEDED
Status: COMPLETED | OUTPATIENT
Start: 2019-04-22 | End: 2019-04-22

## 2019-04-22 RX ORDER — MORPHINE SULFATE 10 MG/ML
6 INJECTION INTRAMUSCULAR; INTRAVENOUS; SUBCUTANEOUS
Status: DISCONTINUED | OUTPATIENT
Start: 2019-04-22 | End: 2019-04-24 | Stop reason: HOSPADM

## 2019-04-22 RX ORDER — ACETAMINOPHEN 325 MG/1
650 TABLET ORAL ONCE AS NEEDED
Status: DISCONTINUED | OUTPATIENT
Start: 2019-04-22 | End: 2019-04-22 | Stop reason: HOSPADM

## 2019-04-22 RX ORDER — MIDAZOLAM HYDROCHLORIDE 1 MG/ML
1 INJECTION INTRAMUSCULAR; INTRAVENOUS
Status: DISCONTINUED | OUTPATIENT
Start: 2019-04-22 | End: 2019-04-22

## 2019-04-22 RX ORDER — LEVOTHYROXINE SODIUM 0.05 MG/1
50 TABLET ORAL DAILY
Status: DISCONTINUED | OUTPATIENT
Start: 2019-04-22 | End: 2019-04-24 | Stop reason: HOSPADM

## 2019-04-22 RX ORDER — ROPIVACAINE HYDROCHLORIDE 2 MG/ML
INJECTION, SOLUTION EPIDURAL; INFILTRATION; PERINEURAL
Status: COMPLETED | OUTPATIENT
Start: 2019-04-22 | End: 2019-04-22

## 2019-04-22 RX ORDER — SODIUM CHLORIDE 9 MG/ML
40 INJECTION, SOLUTION INTRAVENOUS AS NEEDED
Status: DISCONTINUED | OUTPATIENT
Start: 2019-04-22 | End: 2019-04-22

## 2019-04-22 RX ORDER — DEXAMETHASONE SODIUM PHOSPHATE 4 MG/ML
8 INJECTION, SOLUTION INTRA-ARTICULAR; INTRALESIONAL; INTRAMUSCULAR; INTRAVENOUS; SOFT TISSUE ONCE AS NEEDED
Status: COMPLETED | OUTPATIENT
Start: 2019-04-22 | End: 2019-04-22

## 2019-04-22 RX ORDER — ONDANSETRON 4 MG/1
4 TABLET, FILM COATED ORAL EVERY 6 HOURS PRN
Status: DISCONTINUED | OUTPATIENT
Start: 2019-04-22 | End: 2019-04-24 | Stop reason: HOSPADM

## 2019-04-22 RX ORDER — OXYCODONE HYDROCHLORIDE 5 MG/1
10 TABLET ORAL EVERY 4 HOURS PRN
Status: DISCONTINUED | OUTPATIENT
Start: 2019-04-22 | End: 2019-04-24 | Stop reason: HOSPADM

## 2019-04-22 RX ORDER — AZELASTINE 1 MG/ML
1 SPRAY, METERED NASAL AS NEEDED
Status: DISCONTINUED | OUTPATIENT
Start: 2019-04-22 | End: 2019-04-24 | Stop reason: HOSPADM

## 2019-04-22 RX ORDER — ACETAMINOPHEN 500 MG
1000 TABLET ORAL ONCE
Status: COMPLETED | OUTPATIENT
Start: 2019-04-22 | End: 2019-04-22

## 2019-04-22 RX ORDER — OXYCODONE HCL 10 MG/1
10 TABLET, FILM COATED, EXTENDED RELEASE ORAL ONCE
Status: COMPLETED | OUTPATIENT
Start: 2019-04-22 | End: 2019-04-22

## 2019-04-22 RX ORDER — CETIRIZINE HYDROCHLORIDE 10 MG/1
10 TABLET ORAL DAILY
Status: DISCONTINUED | OUTPATIENT
Start: 2019-04-22 | End: 2019-04-24 | Stop reason: HOSPADM

## 2019-04-22 RX ORDER — PSEUDOEPHEDRINE HCL 30 MG
100 TABLET ORAL 2 TIMES DAILY PRN
Qty: 60 EACH | Refills: 0 | Status: CANCELLED | OUTPATIENT
Start: 2019-04-22

## 2019-04-22 RX ORDER — FAMOTIDINE 20 MG/1
40 TABLET, FILM COATED ORAL DAILY
Status: DISCONTINUED | OUTPATIENT
Start: 2019-04-22 | End: 2019-04-24 | Stop reason: HOSPADM

## 2019-04-22 RX ORDER — DIPHENHYDRAMINE HYDROCHLORIDE 50 MG/ML
12.5 INJECTION INTRAMUSCULAR; INTRAVENOUS ONCE
Status: COMPLETED | OUTPATIENT
Start: 2019-04-22 | End: 2019-04-22

## 2019-04-22 RX ORDER — SODIUM CHLORIDE 9 MG/ML
100 INJECTION, SOLUTION INTRAVENOUS CONTINUOUS
Status: DISCONTINUED | OUTPATIENT
Start: 2019-04-22 | End: 2019-04-24 | Stop reason: HOSPADM

## 2019-04-22 RX ORDER — ONDANSETRON 2 MG/ML
4 INJECTION INTRAMUSCULAR; INTRAVENOUS ONCE AS NEEDED
Status: COMPLETED | OUTPATIENT
Start: 2019-04-22 | End: 2019-04-22

## 2019-04-22 RX ORDER — ONDANSETRON 2 MG/ML
4 INJECTION INTRAMUSCULAR; INTRAVENOUS EVERY 6 HOURS PRN
Status: DISCONTINUED | OUTPATIENT
Start: 2019-04-22 | End: 2019-04-24 | Stop reason: HOSPADM

## 2019-04-22 RX ORDER — MELOXICAM 7.5 MG/1
15 TABLET ORAL ONCE
Status: COMPLETED | OUTPATIENT
Start: 2019-04-22 | End: 2019-04-22

## 2019-04-22 RX ORDER — ASPIRIN 325 MG
325 TABLET, DELAYED RELEASE (ENTERIC COATED) ORAL EVERY 12 HOURS SCHEDULED
Status: DISCONTINUED | OUTPATIENT
Start: 2019-04-22 | End: 2019-04-24 | Stop reason: HOSPADM

## 2019-04-22 RX ORDER — SENNA AND DOCUSATE SODIUM 50; 8.6 MG/1; MG/1
2 TABLET, FILM COATED ORAL 2 TIMES DAILY PRN
Status: DISCONTINUED | OUTPATIENT
Start: 2019-04-22 | End: 2019-04-24 | Stop reason: HOSPADM

## 2019-04-22 RX ORDER — KETAMINE HYDROCHLORIDE 100 MG/ML
INJECTION INTRAMUSCULAR; INTRAVENOUS AS NEEDED
Status: DISCONTINUED | OUTPATIENT
Start: 2019-04-22 | End: 2019-04-22 | Stop reason: SURG

## 2019-04-22 RX ORDER — EPHEDRINE SULFATE 50 MG/ML
INJECTION, SOLUTION INTRAVENOUS AS NEEDED
Status: DISCONTINUED | OUTPATIENT
Start: 2019-04-22 | End: 2019-04-22 | Stop reason: SURG

## 2019-04-22 RX ORDER — ACETAMINOPHEN 500 MG
1000 TABLET ORAL 3 TIMES DAILY
Status: DISCONTINUED | OUTPATIENT
Start: 2019-04-22 | End: 2019-04-24 | Stop reason: HOSPADM

## 2019-04-22 RX ORDER — NALOXONE HCL 0.4 MG/ML
0.4 VIAL (ML) INJECTION
Status: DISCONTINUED | OUTPATIENT
Start: 2019-04-22 | End: 2019-04-24 | Stop reason: HOSPADM

## 2019-04-22 RX ORDER — PROPOFOL 10 MG/ML
VIAL (ML) INTRAVENOUS CONTINUOUS PRN
Status: DISCONTINUED | OUTPATIENT
Start: 2019-04-22 | End: 2019-04-22 | Stop reason: SURG

## 2019-04-22 RX ORDER — MEPERIDINE HYDROCHLORIDE 25 MG/ML
12.5 INJECTION INTRAMUSCULAR; INTRAVENOUS; SUBCUTANEOUS
Status: DISCONTINUED | OUTPATIENT
Start: 2019-04-22 | End: 2019-04-22 | Stop reason: HOSPADM

## 2019-04-22 RX ORDER — KETOROLAC TROMETHAMINE 30 MG/ML
15 INJECTION, SOLUTION INTRAMUSCULAR; INTRAVENOUS EVERY 6 HOURS PRN
Status: DISCONTINUED | OUTPATIENT
Start: 2019-04-22 | End: 2019-04-24 | Stop reason: HOSPADM

## 2019-04-22 RX ORDER — DOCUSATE SODIUM 100 MG/1
100 CAPSULE, LIQUID FILLED ORAL 2 TIMES DAILY PRN
Status: DISCONTINUED | OUTPATIENT
Start: 2019-04-22 | End: 2019-04-24 | Stop reason: HOSPADM

## 2019-04-22 RX ORDER — ONDANSETRON 2 MG/ML
4 INJECTION INTRAMUSCULAR; INTRAVENOUS ONCE AS NEEDED
Status: DISCONTINUED | OUTPATIENT
Start: 2019-04-22 | End: 2019-04-22 | Stop reason: HOSPADM

## 2019-04-22 RX ORDER — MIDAZOLAM HYDROCHLORIDE 1 MG/ML
2 INJECTION INTRAMUSCULAR; INTRAVENOUS
Status: DISCONTINUED | OUTPATIENT
Start: 2019-04-22 | End: 2019-04-22

## 2019-04-22 RX ORDER — MECLIZINE HYDROCHLORIDE 25 MG/1
25 TABLET ORAL 3 TIMES DAILY PRN
Status: DISCONTINUED | OUTPATIENT
Start: 2019-04-22 | End: 2019-04-24 | Stop reason: HOSPADM

## 2019-04-22 RX ORDER — OXYCODONE HYDROCHLORIDE 5 MG/1
5 TABLET ORAL EVERY 4 HOURS PRN
Status: DISCONTINUED | OUTPATIENT
Start: 2019-04-22 | End: 2019-04-24 | Stop reason: HOSPADM

## 2019-04-22 RX ORDER — SODIUM CHLORIDE 0.9 % (FLUSH) 0.9 %
1-10 SYRINGE (ML) INJECTION AS NEEDED
Status: DISCONTINUED | OUTPATIENT
Start: 2019-04-22 | End: 2019-04-22

## 2019-04-22 RX ORDER — SODIUM CHLORIDE 0.9 % (FLUSH) 0.9 %
3 SYRINGE (ML) INJECTION EVERY 12 HOURS SCHEDULED
Status: DISCONTINUED | OUTPATIENT
Start: 2019-04-22 | End: 2019-04-22

## 2019-04-22 RX ORDER — SODIUM CHLORIDE, SODIUM LACTATE, POTASSIUM CHLORIDE, CALCIUM CHLORIDE 600; 310; 30; 20 MG/100ML; MG/100ML; MG/100ML; MG/100ML
9 INJECTION, SOLUTION INTRAVENOUS CONTINUOUS
Status: DISCONTINUED | OUTPATIENT
Start: 2019-04-22 | End: 2019-04-22

## 2019-04-22 RX ORDER — DIPHENHYDRAMINE HYDROCHLORIDE 50 MG/ML
12.5 INJECTION INTRAMUSCULAR; INTRAVENOUS
Status: DISCONTINUED | OUTPATIENT
Start: 2019-04-22 | End: 2019-04-22 | Stop reason: HOSPADM

## 2019-04-22 RX ORDER — MAGNESIUM HYDROXIDE 1200 MG/15ML
LIQUID ORAL AS NEEDED
Status: DISCONTINUED | OUTPATIENT
Start: 2019-04-22 | End: 2019-04-22 | Stop reason: HOSPADM

## 2019-04-22 RX ADMIN — FAMOTIDINE 20 MG: 10 INJECTION, SOLUTION INTRAVENOUS at 07:00

## 2019-04-22 RX ADMIN — EPHEDRINE SULFATE 5 MG: 50 INJECTION, SOLUTION INTRAVENOUS at 09:00

## 2019-04-22 RX ADMIN — KETAMINE HYDROCHLORIDE 10 MG: 100 INJECTION INTRAMUSCULAR; INTRAVENOUS at 07:53

## 2019-04-22 RX ADMIN — ASPIRIN 325 MG: 325 TABLET, DELAYED RELEASE ORAL at 18:58

## 2019-04-22 RX ADMIN — SODIUM CHLORIDE 1000 MG: 9 INJECTION, SOLUTION INTRAVENOUS at 09:39

## 2019-04-22 RX ADMIN — CETIRIZINE HYDROCHLORIDE 10 MG: 10 TABLET, FILM COATED ORAL at 11:52

## 2019-04-22 RX ADMIN — KETAMINE HYDROCHLORIDE 10 MG: 100 INJECTION INTRAMUSCULAR; INTRAVENOUS at 08:36

## 2019-04-22 RX ADMIN — SODIUM CHLORIDE 100 ML/HR: 9 INJECTION, SOLUTION INTRAVENOUS at 11:59

## 2019-04-22 RX ADMIN — DEXAMETHASONE SODIUM PHOSPHATE 8 MG: 4 INJECTION, SOLUTION INTRAMUSCULAR; INTRAVENOUS at 07:01

## 2019-04-22 RX ADMIN — DIPHENHYDRAMINE HYDROCHLORIDE 12.5 MG: 50 INJECTION, SOLUTION INTRAMUSCULAR; INTRAVENOUS at 07:00

## 2019-04-22 RX ADMIN — LIDOCAINE HYDROCHLORIDE 0.5 ML: 10 INJECTION, SOLUTION EPIDURAL; INFILTRATION; INTRACAUDAL; PERINEURAL at 06:10

## 2019-04-22 RX ADMIN — ROPIVACAINE HYDROCHLORIDE 10 ML: 2 INJECTION, SOLUTION EPIDURAL; INFILTRATION at 07:07

## 2019-04-22 RX ADMIN — FAMOTIDINE 40 MG: 20 TABLET, FILM COATED ORAL at 11:52

## 2019-04-22 RX ADMIN — VANCOMYCIN HYDROCHLORIDE 750 MG: 750 INJECTION, POWDER, LYOPHILIZED, FOR SOLUTION INTRAVENOUS at 18:59

## 2019-04-22 RX ADMIN — MELOXICAM 7.5 MG: 7.5 TABLET ORAL at 11:52

## 2019-04-22 RX ADMIN — EPHEDRINE SULFATE 5 MG: 50 INJECTION, SOLUTION INTRAVENOUS at 09:31

## 2019-04-22 RX ADMIN — SODIUM CHLORIDE 1000 MG: 9 INJECTION, SOLUTION INTRAVENOUS at 07:36

## 2019-04-22 RX ADMIN — LEVOTHYROXINE SODIUM 50 MCG: 50 TABLET ORAL at 11:52

## 2019-04-22 RX ADMIN — MORPHINE SULFATE 6 MG: 10 INJECTION INTRAVENOUS at 11:58

## 2019-04-22 RX ADMIN — PROPOFOL 50 MCG/KG/MIN: 10 INJECTION, EMULSION INTRAVENOUS at 07:33

## 2019-04-22 RX ADMIN — ONDANSETRON 4 MG: 2 INJECTION, SOLUTION INTRAMUSCULAR; INTRAVENOUS at 07:00

## 2019-04-22 RX ADMIN — ACETAMINOPHEN 1000 MG: 500 TABLET, FILM COATED ORAL at 11:52

## 2019-04-22 RX ADMIN — KETAMINE HYDROCHLORIDE 10 MG: 100 INJECTION INTRAMUSCULAR; INTRAVENOUS at 09:40

## 2019-04-22 RX ADMIN — ACETAMINOPHEN 1000 MG: 500 TABLET, FILM COATED ORAL at 18:58

## 2019-04-22 RX ADMIN — EPHEDRINE SULFATE 5 MG: 50 INJECTION, SOLUTION INTRAVENOUS at 08:42

## 2019-04-22 RX ADMIN — BUPIVACAINE HYDROCHLORIDE 2 ML: 5 INJECTION, SOLUTION PERINEURAL at 07:20

## 2019-04-22 RX ADMIN — SODIUM CHLORIDE, POTASSIUM CHLORIDE, SODIUM LACTATE AND CALCIUM CHLORIDE 9 ML/HR: 600; 310; 30; 20 INJECTION, SOLUTION INTRAVENOUS at 06:10

## 2019-04-22 RX ADMIN — OXYCODONE HYDROCHLORIDE 5 MG: 5 TABLET ORAL at 20:57

## 2019-04-22 RX ADMIN — ACETAMINOPHEN 1000 MG: 500 TABLET, FILM COATED ORAL at 06:23

## 2019-04-22 RX ADMIN — KETAMINE HYDROCHLORIDE 10 MG: 100 INJECTION INTRAMUSCULAR; INTRAVENOUS at 07:32

## 2019-04-22 RX ADMIN — VANCOMYCIN HYDROCHLORIDE 1000 MG: 1 INJECTION, POWDER, LYOPHILIZED, FOR SOLUTION INTRAVENOUS at 06:20

## 2019-04-22 RX ADMIN — SODIUM CHLORIDE, PRESERVATIVE FREE 12.5 MG: 5 INJECTION INTRAVENOUS at 18:01

## 2019-04-22 RX ADMIN — ONDANSETRON 4 MG: 2 INJECTION, SOLUTION INTRAMUSCULAR; INTRAVENOUS at 12:40

## 2019-04-22 RX ADMIN — OXYCODONE HYDROCHLORIDE 10 MG: 10 TABLET, FILM COATED, EXTENDED RELEASE ORAL at 06:23

## 2019-04-22 RX ADMIN — MELOXICAM 15 MG: 7.5 TABLET ORAL at 06:23

## 2019-04-22 NOTE — ANESTHESIA PROCEDURE NOTES
is a 28 year old female here for an obstetrics check.  She is      . Gestational age: 36w5d     She reports fetal movement  She reports occasional contractions, mostly at night when her bladder is full  She denies bleeding  She denies headaches  She reports slight edema  She denies abdominal pain  She denies rupture of membranes  She denies vision changes    See Prenatal Flowsheet for additional comments.   Peripheral Block      Patient location during procedure: pre-op  Reason for block: post-op pain management and MD/surgeon's request  Performed by  CRNA: Dave Garber CRNA  Preanesthetic Checklist  Completed: patient identified, site marked, surgical consent, pre-op evaluation, timeout performed, IV checked, risks and benefits discussed and monitors and equipment checked  Prep:  Sterile barriers:cap, gloves, gown, mask and sterile barriers  Prep: ChloraPrep  Patient monitoring: blood pressure monitoring, continuous pulse oximetry and EKG  Procedure  Sedation:yes    Guidance:ultrasound guided  Images:still images obtained    Laterality:left  Block Type:adductor canal block  Injection Technique:single-shot  Needle Type:echogenic  Needle Gauge:22 G    Medications Used: ropivacaine (NAROPIN) 0.2% injection, 10 mL  Med admintered at 4/22/2019 7:07 AM  Post Assessment  Injection Assessment: negative aspiration for heme, no paresthesia on injection and incremental injection  Patient Tolerance:comfortable throughout block  Complications:no             Clear

## 2019-04-22 NOTE — SIGNIFICANT NOTE
04/22/19 1452   Rehab Time/Intention   Evaluation Not Performed patient/family decline, not feeling well  (attempted to see x3 in PM.  Pt with significant nausea and vomiting.  patient unable to participate in evaluation.  Notified RN, will follow up in AM.)   Rehab Treatment   Discipline physical therapist

## 2019-04-22 NOTE — DISCHARGE INSTRUCTIONS
Total Knee Replacement Discharge Instructions:    I. ACTIVITIES:  1. Exercises:  ? Complete exercise program as taught by the hospital physical therapist 2 times per day  ? Exercise program will be advanced by the physical therapist  ? During the day be up ambulating every 2 hours (while awake) for short distances  ? Complete the ankle pump exercises at least 10 times per hour (while awake)  ? Elevate legs most of the day the first week post operatively and thereafter elevate legs when in bed and for at least 30 minutes during the day. Caution must be taken to avoid pillow placement under the bend of the knee as this can lead to flexion contractures of the knee.  ? Use cold packs 20-30 minutes approximately 5 times per day. This should be done before and after completing your exercises and at any time you are experiencing pain/ stiffness in your operative extremity.  ? Apply 6 inch ace wraps to operative extremity from ankle to groin. Please keep in place at all times except when bathing.      2. Activities of Daily Living:  ? No tub baths, hot tubs, or swimming pools for 4 weeks  ? May shower on post-operative day #3- Do not scrub or rub around the incision or mesh. No soap or alcohol to incision, just let water run over wound.         II. RESTRICTIONS  ? Do not cross legs or kneel  ? Your surgeon will discuss with you when you will be able to drive again.  ? Weight bearing as tolerated  ? First week stay inside on even terrain. May go up and down stairs one stair at a time utilizing the hand rail  ? After one week, you may venture outside.     III. PRECAUTIONS:  ? Everyone that comes near you should wash their hands  ? No elective dental, genital-urinary, or colon procedures or surgical procedures for 12 weeks after surgery unless absolutely necessary.  ?  If dental work or surgical procedure is deemed absolutely necessary, you will need to contact your surgeon as you will need to take antibiotics 1 hour prior to  any dental work (including teeth cleanings).  ? Please discuss with your surgeon prophylactic antibiotics as the length of time this intervention will be necessary for you varies with each patient’s health history and situation.  ? Avoid sick people. If you must be around someone who is ill, they should wear a mask.  ? Avoid visits to the Emergency Room or Urgent Care unless you are having a life              threatening event.     IV. INCISION CARE:  ? Wash your hands prior to dressing changes  ? Incision and knee should be covered with an ACE wrap daily for compression. No creams or ointments to the incision  ? Do not touch or pick at the incision  ? Check incision every day and notify surgeon immediately if any of the following signs or symptoms are noted:  o Increase in redness  o Increase in swelling around the incision and of the entire extremity  o Increase in pain  o Drainage oozing from the incision  o Pulling apart of the edges of the incision  o Increase in overall body temperature (greater than 100.5 degrees)  ? You will be given further instructions on removal of the glue and mesh over your incision at your first follow up visit at the office.     V. MEDICATIONS:   1. Anticoagulants: You will be discharged on an anticoagulant, typically either Aspirin or Eliquis. This is a prophylactic medication that helps prevent blood clots during your post-operative period. The type and length of dosage varies based on your individual needs, procedure performed, and surgeon’s preference.  ? While taking the anticoagulant, you should avoid taking any additional aspirin, ibuprofen (Advil or Motrin), Aleve (Naprosyn) or other non-steroidal anti-inflammatory medications.   ? Notify surgeon immediately if any amanda bleeding is noted in the urine, stool, emesis, or from the nose or the incision. Blood in the stool will often appear as black rather than red. Blood in urine may appear as pink. Blood in emesis may appear as  brown/black like coffee grounds.  ? You will need to apply pressure for longer periods of time to any cuts or abrasions to stop bleeding  ? Avoid alcohol while taking anticoagulants    2. Stool Softeners: You will be at greater risk of constipation after surgery due to being less mobile and the pain medications.   ? Take stool softeners as instructed by your surgeon while on pain medications. Over the counter Colace 100 mg 1-2 capsules twice daily.   ? If stools become too loose or too frequent, please decreases the dosage or stop the stool softener.  ? If constipation occurs despite use of stool softeners, you are to continue the stool softeners and add a laxative (Milk of Magnesia 1 ounce daily as needed)  ? Drink plenty of fluids, and eat fruits and vegetables during your recovery time    3. Pain Medications utilized after surgery are narcotics and the law requires that the following information be given to all patients that are prescribed narcotics:  ? CLASSIFICATION: Pain medications are called Opioids and are narcotics  ? LEGALITIES: It is illegal to share narcotics with others and to drive within 24 hours of taking narcotics  ? POTENTIAL SIDE EFFECTS: Potential side effects of opioids include: nausea, vomiting, itching, dizziness, drowsiness, dry mouth, constipation, and difficulty urinating.  ? POTENTIAL ADVERSE EFFECTS:   o Opioid tolerance can develop with use of pain medications and this simply means that it requires more and more of the medication to control pain; however, this is seen more in patients that use opioids for longer periods of time.  o Opioid dependence can develop with use of Opioids and this simply means that to stop the medication can cause withdrawal symptoms; however, this is seen with patients that use Opioids for longer periods of time.  o Opioid addiction can develop with use of Opioids and the incidence of this is very unlikely in patients who take the medications as ordered and  stop the medications as instructed.  o Opioid overdose can be dangerous, but is unlikely when the medication is taken as ordered and stopped when ordered. It is important not to mix opioids with alcohol or with and type of sedative such as Benadryl as this can lead to over sedation and respiratory difficulty.  ? DOSAGE:   o Pain medications will need to be taken consistently for the first week to decrease pain and promote adequate pain relief and participation in physical therapy.  o After the initial surgical pain begins to resolve, you may begin to decrease the pain medication. By the end of 6 weeks, you should be off of pain medications.  o Refills will not be given by the office during evening hours, on weekends, or after 12 weeks post-op.  o To seek refills on pain medications during the initial 6 week post-operative period, you must call the office 48 hours in advance to request the refill. The office will then notify you when to  the prescription. DO NOT wait until you are out of the medication to request a refill.    VI. FOLLOW-UP VISITS:  ? You will need to follow up in the office with Imelda Hdez Nurse Practitioner in 2 weeks. Please call  [phone]  to schedule this appointment.  ? You will need to follow up with your primary care physician within 4 weeks.  ? If you have any concerns or suspected complications prior to your follow up visit, please call your surgeons office. Do not wait until your appointment time if you suspect complications. These will need to be addressed in the office promptly.

## 2019-04-22 NOTE — PLAN OF CARE
Problem: Patient Care Overview  Goal: Individualization and Mutuality  Outcome: Ongoing (interventions implemented as appropriate)   04/22/19 1027 04/22/19 1627   Individualization   Patient Specific Preferences goes by Belkis --    Patient Specific Goals (Include Timeframe) --  Will ambulate in room/hopkins by end of shift

## 2019-04-22 NOTE — PLAN OF CARE
Problem: Patient Care Overview  Goal: Plan of Care Review  Outcome: Ongoing (interventions implemented as appropriate)   04/22/19 0740   Coping/Psychosocial   Plan of Care Reviewed With patient;spouse   Plan of Care Review   Progress no change   OTHER   Outcome Summary Arrive from [facility]. LLE wrapped in ace wrap and immobilizer in place w/ polar pack applied. IV pain meds given x 1. Zofran given for emesis after pain meds. IV antibiotics. Voiding w/o difficulty

## 2019-04-22 NOTE — INTERVAL H&P NOTE
H&P reviewed. The patient was examined and there are no changes to the H&P.     Vitals:    04/22/19 0607   BP: 110/69   BP Location: Left arm   Patient Position: Lying   Pulse: 74   Resp: 16   Temp: 98.3 °F (36.8 °C)   TempSrc: Oral   SpO2: 97%   Weight: 54.6 kg (120 lb 6.4 oz)

## 2019-04-22 NOTE — ANESTHESIA PROCEDURE NOTES
Spinal Block      Patient location during procedure: pre-op  Indication:at surgeon's request and post-op pain management  Performed By  CRNA: Dave Garber CRNA  Preanesthetic Checklist  Completed: patient identified, surgical consent, pre-op evaluation, timeout performed, IV checked, risks and benefits discussed and monitors and equipment checked  Spinal Block Prep:  Patient Position:sitting  Sterile Tech:cap, gown, mask, gloves and sterile barriers  Prep:Betadine  Patient Monitoring:blood pressure monitoring, continuous pulse oximetry and EKG  Spinal Block Procedure  Approach:midline  Guidance:landmark technique and palpation technique  Location:L3-L4  Needle Type:Quincke  Needle Gauge:25 G  Placement of Spinal needle event:cerebrospinal fluid aspirated    Fluid Appearance:clear  Medications: bupivacaine (MARCAINE) injection 0.5%, 2 mL  Med Administered at 4/22/2019 7:20 AM   Post Assessment  Patient Tolerance:patient tolerated the procedure well with no apparent complications  Complications no

## 2019-04-22 NOTE — ANESTHESIA PREPROCEDURE EVALUATION
Anesthesia Evaluation     Patient summary reviewed and Nursing notes reviewed   history of anesthetic complications (HX spinal HA):  NPO Solid Status: > 8 hours  NPO Liquid Status: > 4 hours           Airway   Mallampati: II  TM distance: >3 FB  Neck ROM: full  No difficulty expected  Dental - normal exam     Pulmonary - normal exam    breath sounds clear to auscultation  (+) recent URI (off antibiotics for 7 days) resolved,   Asthma: no inhaler use.  Cardiovascular - normal exam    ECG reviewed  Rhythm: regular  Rate: normal        Neuro/Psych- negative ROS  GI/Hepatic/Renal/Endo    (+)   hypothyroidism,     Musculoskeletal     Abdominal  - normal exam   Substance History - negative use     OB/GYN          Other   (+) arthritis                     Anesthesia Plan    ASA 2     MAC, regional and spinal     intravenous induction   Anesthetic plan, all risks, benefits, and alternatives have been provided, discussed and informed consent has been obtained with: patient.  Use of blood products discussed with patient  Consented to blood products.

## 2019-04-22 NOTE — SIGNIFICANT NOTE
04/22/19 1453   Rehab Time/Intention   Evaluation Not Performed patient/family decline, not feeling well  (Patient with nausea and vomiting, declined evaluation.  Nursing informed.)   Rehab Treatment   Discipline occupational therapist   Recommendation   OT - Next Appointment 04/23/19

## 2019-04-22 NOTE — ANESTHESIA POSTPROCEDURE EVALUATION
Patient: Nikkie Kennedy    Procedure Summary     Date:  04/22/19 Room / Location:   [facility] /  [facility]    Anesthesia Start:  0729 Anesthesia Stop:  1005    Procedure:  left total knee arthroplasty, possible placement of ON-Q pump, and all associated procedures (Left Knee) Diagnosis:       Primary osteoarthritis of left knee      (Primary osteoarthritis of left knee [M17.12])    Surgeon:  Dhaval Quinn MD Provider:  Dave Garber CRNA    Anesthesia Type:  MAC, regional, spinal ASA Status:  2          Anesthesia Type: MAC, regional, spinal  Last vitals  BP   117/71 (04/22/19 1027)   Temp   97.4 °F (36.3 °C) (04/22/19 1004)   Pulse   79 (04/22/19 1027)   Resp   12 (04/22/19 1004)     SpO2   96 % (04/22/19 1027)     Post Anesthesia Care and Evaluation    Patient location during evaluation: PACU  Patient participation: complete - patient participated  Level of consciousness: awake and alert  Pain score: 3  Pain management: adequate  Airway patency: patent  Anesthetic complications: No anesthetic complications  PONV Status: none  Cardiovascular status: acceptable  Respiratory status: acceptable  Hydration status: acceptable

## 2019-04-23 LAB
ANION GAP SERPL CALCULATED.3IONS-SCNC: 7.4 MMOL/L
BASOPHILS # BLD AUTO: 0.03 10*3/MM3 (ref 0–0.2)
BASOPHILS NFR BLD AUTO: 0.2 % (ref 0–1.5)
BUN BLD-MCNC: 12 MG/DL (ref 8–23)
BUN/CREAT SERPL: 20.7 (ref 7–25)
CALCIUM SPEC-SCNC: 8.4 MG/DL (ref 8.6–10.5)
CHLORIDE SERPL-SCNC: 111 MMOL/L (ref 98–107)
CO2 SERPL-SCNC: 25.6 MMOL/L (ref 22–29)
CREAT BLD-MCNC: 0.58 MG/DL (ref 0.57–1)
DEPRECATED RDW RBC AUTO: 45.9 FL (ref 37–54)
EOSINOPHIL # BLD AUTO: 0.01 10*3/MM3 (ref 0–0.4)
EOSINOPHIL NFR BLD AUTO: 0.1 % (ref 0.3–6.2)
ERYTHROCYTE [DISTWIDTH] IN BLOOD BY AUTOMATED COUNT: 13.2 % (ref 12.3–15.4)
GFR SERPL CREATININE-BSD FRML MDRD: 103 ML/MIN/1.73
GLUCOSE BLD-MCNC: 120 MG/DL (ref 65–99)
HCT VFR BLD AUTO: 35 % (ref 34–46.6)
HGB BLD-MCNC: 11.1 G/DL (ref 12–15.9)
IMM GRANULOCYTES # BLD AUTO: 0.05 10*3/MM3 (ref 0–0.05)
IMM GRANULOCYTES NFR BLD AUTO: 0.4 % (ref 0–0.5)
LYMPHOCYTES # BLD AUTO: 1.78 10*3/MM3 (ref 0.7–3.1)
LYMPHOCYTES NFR BLD AUTO: 12.7 % (ref 19.6–45.3)
MCH RBC QN AUTO: 30 PG (ref 26.6–33)
MCHC RBC AUTO-ENTMCNC: 31.7 G/DL (ref 31.5–35.7)
MCV RBC AUTO: 94.6 FL (ref 79–97)
MONOCYTES # BLD AUTO: 1.13 10*3/MM3 (ref 0.1–0.9)
MONOCYTES NFR BLD AUTO: 8.1 % (ref 5–12)
NEUTROPHILS # BLD AUTO: 10.99 10*3/MM3 (ref 1.7–7)
NEUTROPHILS NFR BLD AUTO: 78.5 % (ref 42.7–76)
NRBC BLD AUTO-RTO: 0 /100 WBC (ref 0–0.2)
PLATELET # BLD AUTO: 183 10*3/MM3 (ref 140–450)
PMV BLD AUTO: 11 FL (ref 6–12)
POTASSIUM BLD-SCNC: 4.2 MMOL/L (ref 3.5–5.2)
RBC # BLD AUTO: 3.7 10*6/MM3 (ref 3.77–5.28)
SODIUM BLD-SCNC: 144 MMOL/L (ref 136–145)
WBC NRBC COR # BLD: 13.99 10*3/MM3 (ref 3.4–10.8)

## 2019-04-23 PROCEDURE — 97116 GAIT TRAINING THERAPY: CPT

## 2019-04-23 PROCEDURE — 97165 OT EVAL LOW COMPLEX 30 MIN: CPT

## 2019-04-23 PROCEDURE — 80048 BASIC METABOLIC PNL TOTAL CA: CPT | Performed by: ORTHOPAEDIC SURGERY

## 2019-04-23 PROCEDURE — 97161 PT EVAL LOW COMPLEX 20 MIN: CPT

## 2019-04-23 PROCEDURE — G0378 HOSPITAL OBSERVATION PER HR: HCPCS

## 2019-04-23 PROCEDURE — 25010000002 KETOROLAC TROMETHAMINE PER 15 MG: Performed by: ORTHOPAEDIC SURGERY

## 2019-04-23 PROCEDURE — 97110 THERAPEUTIC EXERCISES: CPT

## 2019-04-23 PROCEDURE — 85025 COMPLETE CBC W/AUTO DIFF WBC: CPT | Performed by: ORTHOPAEDIC SURGERY

## 2019-04-23 PROCEDURE — 25010000002 ONDANSETRON PER 1 MG: Performed by: ORTHOPAEDIC SURGERY

## 2019-04-23 RX ADMIN — KETOROLAC TROMETHAMINE 15 MG: 30 INJECTION, SOLUTION INTRAMUSCULAR; INTRAVENOUS at 08:21

## 2019-04-23 RX ADMIN — OXYCODONE HYDROCHLORIDE 5 MG: 5 TABLET ORAL at 08:57

## 2019-04-23 RX ADMIN — OXYCODONE HYDROCHLORIDE 5 MG: 5 TABLET ORAL at 21:29

## 2019-04-23 RX ADMIN — SODIUM CHLORIDE 100 ML/HR: 9 INJECTION, SOLUTION INTRAVENOUS at 00:50

## 2019-04-23 RX ADMIN — ASPIRIN 325 MG: 325 TABLET, DELAYED RELEASE ORAL at 08:12

## 2019-04-23 RX ADMIN — ASPIRIN 325 MG: 325 TABLET, DELAYED RELEASE ORAL at 20:43

## 2019-04-23 RX ADMIN — OXYCODONE HYDROCHLORIDE 5 MG: 5 TABLET ORAL at 05:17

## 2019-04-23 RX ADMIN — ONDANSETRON 4 MG: 2 INJECTION, SOLUTION INTRAMUSCULAR; INTRAVENOUS at 00:50

## 2019-04-23 RX ADMIN — FAMOTIDINE 40 MG: 20 TABLET, FILM COATED ORAL at 08:12

## 2019-04-23 RX ADMIN — OXYCODONE HYDROCHLORIDE 5 MG: 5 TABLET ORAL at 12:48

## 2019-04-23 RX ADMIN — LEVOTHYROXINE SODIUM 50 MCG: 50 TABLET ORAL at 08:12

## 2019-04-23 RX ADMIN — ACETAMINOPHEN 1000 MG: 500 TABLET, FILM COATED ORAL at 15:58

## 2019-04-23 RX ADMIN — OXYCODONE HYDROCHLORIDE 5 MG: 5 TABLET ORAL at 17:18

## 2019-04-23 RX ADMIN — OXYCODONE HYDROCHLORIDE 10 MG: 5 TABLET ORAL at 01:17

## 2019-04-23 RX ADMIN — CETIRIZINE HYDROCHLORIDE 10 MG: 10 TABLET, FILM COATED ORAL at 08:12

## 2019-04-23 RX ADMIN — ONDANSETRON 4 MG: 4 TABLET, FILM COATED ORAL at 08:57

## 2019-04-23 RX ADMIN — ACETAMINOPHEN 1000 MG: 500 TABLET, FILM COATED ORAL at 20:43

## 2019-04-23 RX ADMIN — ACETAMINOPHEN 1000 MG: 500 TABLET, FILM COATED ORAL at 08:12

## 2019-04-23 RX ADMIN — MELOXICAM 7.5 MG: 7.5 TABLET ORAL at 08:12

## 2019-04-23 NOTE — PLAN OF CARE
Problem: Patient Care Overview  Goal: Plan of Care Review  Outcome: Ongoing (interventions implemented as appropriate)   04/23/19 0553   Coping/Psychosocial   Plan of Care Reviewed With patient;spouse   Plan of Care Review   Progress improving   OTHER   Outcome Summary leg elevated, ace wrapped, polar pack and immobilizer in place - voiding w/o difficulty - zofran given to prevent nausea from pain med - pt doing well walking to bathroom and looks forward to working with PT - pt has questions for PT d/t stair climbing in a tri-level house - VSS      Goal: Individualization and Mutuality  Outcome: Ongoing (interventions implemented as appropriate)      Problem: Knee Arthroplasty (Total, Partial) (Adult)  Goal: Signs and Symptoms of Listed Potential Problems Will be Absent, Minimized or Managed (Knee Arthroplasty)  Outcome: Ongoing (interventions implemented as appropriate)      Problem: Pain, Acute (Adult)  Goal: Identify Related Risk Factors and Signs and Symptoms  Outcome: Ongoing (interventions implemented as appropriate)    Goal: Acceptable Pain Control/Comfort Level  Outcome: Ongoing (interventions implemented as appropriate)

## 2019-04-23 NOTE — PLAN OF CARE
Problem: Patient Care Overview  Goal: Plan of Care Review   04/23/19 1009   OTHER   Outcome Summary OT evaluation completed. Patient performed supine to sit with conditional independence. Patient performed sit to stand and functional mobility with SBA and rolling walker. Patient performed LBD with supervision. Patient will need a rolling walker and BSC for home and requested out-patient P.T. in [place]. No skilled OT services needed at this time.

## 2019-04-23 NOTE — PLAN OF CARE
"Problem: Patient Care Overview  Goal: Individualization and Mutuality   04/23/19 8489   Individualization   Patient Specific Preferences Goes by \"Belkis\"   Patient Specific Goals (Include Timeframe) Home 4/24/19   Patient Specific Interventions Outpatient PT, appointment already made         "

## 2019-04-23 NOTE — THERAPY TREATMENT NOTE
Acute Care - Physical Therapy Treatment Note   Julissa Nur     Patient Name: Nikkie Kennedy  : 1950  MRN: [id number]  Today's Date: 2019  Onset of Illness/Injury or Date of Surgery: 19  Date of Referral to PT: 19  Referring Physician: Dr. Quinn    Admit Date: 2019    Visit Dx:    ICD-10-CM ICD-9-CM   1. Status post total left knee replacement Z96.652 V43.65   2. Primary osteoarthritis of left knee M17.12 715.16     Patient Active Problem List   Diagnosis   • Primary osteoarthritis of left knee   • Hypothyroidism   • Osteoporosis   • Vaginismus   • Vaginal atrophy   • S/P total knee arthroplasty, left       Therapy Treatment    Rehabilitation Treatment Summary     Row Name 19 1310             Treatment Time/Intention    Discipline  physical therapist  -BP      Document Type  therapy note (daily note)  -BP      Subjective Information  complains of;pain  -BP      Mode of Treatment  physical therapy  -BP      Patient/Family Observations  Patient supine in bed with HOB elevated. Agreeable to PT   -BP      Care Plan Review  risks/benefits reviewed  -BP      Patient Effort  good  -BP      Recorded by [BP] Robert Aguilar, PT 19 1428      Row Name 19 1310             Cognitive Assessment/Intervention- PT/OT    Personal Safety Interventions  gait belt;nonskid shoes/slippers when out of bed  -BP      Recorded by [BP] Robert Aguilar, PT 19 1428      Row Name 19 1310             Safety Issues, Functional Mobility    Comment, Safety Issues/Impairments (Mobility)  WNL  -BP      Recorded by [BP] Robert Aguilar, PT 19 1428      Row Name 19 1310             Bed Mobility Assessment/Treatment    Bed Mobility Assessment/Treatment  supine-sit;sit-supine  -BP      Supine-Sit [place] (Bed Mobility)  conditional independence  -BP      Sit-Supine [place] (Bed Mobility)  conditional independence  -BP      Assistive Device (Bed Mobility)  head of bed  elevated  -BP      Recorded by [BP] Robert Aguilar, PT 04/23/19 1428      Row Name 04/23/19 1310             Transfer Assessment/Treatment    Transfer Assessment/Treatment  sit-stand transfer;stand-sit transfer;toilet transfer  -BP      Recorded by [BP] Robert Aguilar, PT 04/23/19 1428      Row Name 04/23/19 1310             Sit-Stand Transfer    Sit-Stand [place] (Transfers)  supervision  -BP      Assistive Device (Sit-Stand Transfers)  walker, front-wheeled  -BP      Recorded by [BP] Robert Aguilar, PT 04/23/19 1428      Row Name 04/23/19 1310             Stand-Sit Transfer    Stand-Sit [place] (Transfers)  supervision  -BP      Assistive Device (Stand-Sit Transfers)  walker, front-wheeled  -BP      Recorded by [BP] Robert Aguilar, PT 04/23/19 1428      Row Name 04/23/19 1310             Toilet Transfer    [place] Level (Toilet Transfer)  supervision  -BP      Assistive Device (Toilet Transfer)  walker, front-wheeled  -BP      Recorded by [BP] Robert Aguilar, PT 04/23/19 1428      Row Name 04/23/19 1310             Gait/Stairs Assessment/Training    [place] Level (Gait)  supervision  -BP      Assistive Device (Gait)  walker, front-wheeled  -BP      Distance in Feet (Gait)  120  -BP      Pattern (Gait)  swing-through  -BP      Deviations/Abnormal Patterns (Gait)  gait speed decreased;stride length decreased  -BP      Negotiation (Stairs)  stairs independence;stairs assistive device  -BP      [place] Level (Stairs)  contact guard;verbal cues  -BP      Assistive Device (Stairs)  walker, front-wheeled  -BP      Handrail Location (Stairs)  left side (ascending)  -BP      Number of Steps (Stairs)  4 curb stairs and 5 standard stairs with one handrail   -BP      Comment (Gait/Stairs)  Patient demonstrates swing through gait without need for verbal cues. Patient demonstrates safe use of AD with forward gait and directional changes. No LOB noted. Patient ascends/descends 4 curb  stairs with FWW with CGA and 5 stairs with one hand rail with CGA.   -BP      Recorded by [BP] Robert Aguilar, PT 04/23/19 1428      Row Name 04/23/19 1310             Lower Extremity Supine Therapeutic Exercise    Performed, Supine Lower Extremity (Therapeutic Exercise)  hip abduction/adduction;SAQ (short arc quad) over bolster;SLR (straight leg raise);quadriceps sets;gluteal sets;ankle pumps;heel slides  -BP      Sets/Reps Detail, Supine Lower Extremity (Therapeutic Exercise)  1/10-R LE   -BP      Comment, Supine Lower Extremity (Therapeutic Exercise)  Provided and reviewed written handout.   -BP      Recorded by [BP] Robert Aguilar, PT 04/23/19 1428      Row Name 04/23/19 1310             Positioning and Restraints    Pre-Treatment Position  in bed  -BP      Post Treatment Position  bed  -BP      In Bed  supine;call light within reach;encouraged to call for assist  -BP      Recorded by [BP] Robert Aguilar, PT 04/23/19 1428      Row Name 04/23/19 1310             Pain Assessment    Additional Documentation  Pain Scale: Numbers Pre/Post-Treatment (Group)  -BP      Recorded by [BP] Robert Aguilar, PT 04/23/19 1428      Row Name 04/23/19 1310             Pain Scale: Numbers Pre/Post-Treatment    Pre/Post Treatment Pain Comment  Patient complains of pain however does not rate   -BP      Recorded by [BP] Robert Aguilar, PT 04/23/19 1428      Row Name                Wound 04/22/19 0821 Left knee incision    Wound - Properties Group Date first assessed: 04/22/19 [SHIV] Time first assessed: 0821 [SHIV] Side: Left [SHIV] Location: knee [SHIV] Type: incision [SHIV] Recorded by:  [SHIV] Pepper Gannon RN 04/22/19 0821    Row Name 04/23/19 1310             Plan of Care Review    Plan of Care Reviewed With  patient  -BP      Recorded by [BP] Robert Aguilar, PT 04/23/19 1428      Row Name 04/23/19 1310             Outcome Summary/Treatment Plan (PT)    Daily Summary of Progress (PT)  progress toward functional goals  as expected;prepare for discharge  -BP      Plan for Continued Treatment (PT)  Plan to see one additional visit.   -BP      Anticipated Equipment Needs at Discharge (PT)  bedside commode;front wheeled walker  -BP      Anticipated Discharge Disposition (PT)  home with OP services  -BP      Patient/Family Concerns, Anticipated Discharge Disposition (PT)  Anticipate discharge tomorrow morning  -BP      Recorded by [BP] Robert Aguilar, PT 04/23/19 1428        User Key  (r) = Recorded By, (t) = Taken By, (c) = Cosigned By    Initials Name Effective Dates Discipline    SHIV Pepper Gannon RN 06/16/16 -  Nurse    BP Robert Aguilar, PT 04/03/18 -  PT          Wound 04/22/19 0821 Left knee incision (Active)   Dressing Appearance dry;intact;no drainage 4/23/2019  8:10 AM   Closure [id number] 4/23/2019  8:10 AM   Base other (see comments) 4/23/2019  8:10 AM       Rehab Goal Summary     Row Name 04/23/19 0907             Physical Therapy Goals    Transfer Goal Selection (PT)  transfer, PT goal 1  -BP      Gait Training Goal Selection (PT)  gait training, PT goal 1  -BP      Stairs Goal Selection (PT)  stairs, PT goal 1;stairs, PT goal 2  -BP         Transfer Goal 1 (PT)    Activity/Assistive Device (Transfer Goal 1, PT)  sit-to-stand/stand-to-sit  -BP      [place] Level/Cues Needed (Transfer Goal 1, PT)  supervision required  -BP      Time Frame (Transfer Goal 1, PT)  2 days  -BP      Progress/Outcome (Transfer Goal 1, PT)  goal ongoing  -BP         Gait Training Goal 1 (PT)    Activity/Assistive Device (Gait Training Goal 1, PT)  gait (walking locomotion);walker, rolling  -BP      [place] Level (Gait Training Goal 1, PT)  supervision required  -BP      Distance (Gait Goal 1, PT)  250  -BP      Time Frame (Gait Training Goal 1, PT)  2 days  -BP      Progress/Outcome (Gait Training Goal 1, PT)  goal ongoing  -BP         Stairs Goal 1 (PT)    Activity/Assistive Device (Stairs Goal 1, PT)  descending stairs;ascending  stairs  -BP      [place] Level/Cues Needed (Stairs Goal 1, PT)  contact guard assist;verbal cues required  -BP      Number of Stairs (Stairs Goal 1, PT)  3 curb stairs   -BP      Time Frame (Stairs Goal 1, PT)  2 days  -BP      Progress/Outcome (Stairs Goal 1, PT)  goal ongoing  -BP         Stairs Goal 2 (PT)    Activity/Assistive Device (Stairs Goal 2, PT)  ascending stairs;descending stairs  -BP      [place] Level/Cues Needed (Stairs Goal 2, PT)  contact guard assist  -BP      Number of Stairs (Stairs Goal 2, PT)  6 one handrail   -BP      Time Frame (Stairs Goal 2, PT)  2 days  -BP      Progress/Outcome (Stairs Goal 2, PT)  goal ongoing  -BP         Patient Education Goal (PT)    Activity (Patient Education Goal, PT)  LE HEP   -BP      [place]/Cues/Accuracy (Memory Goal 2, PT)  demonstrates adequately;verbalizes understanding  -BP      Time Frame (Patient Education Goal, PT)  2 days  -BP      Progress/Outcome (Patient Education Goal, PT)  goal ongoing  -BP        User Key  (r) = Recorded By, (t) = Taken By, (c) = Cosigned By    Initials Name Provider Type Discipline    BP Robert Aguilar, PT Physical Therapist PT          Physical Therapy Education     Title: PT OT SLP Therapies (Done)     Topic: Physical Therapy (Done)     Point: Mobility training (Done)     Learning Progress Summary           Patient Acceptance, E,TB, VU by BP at 4/23/2019  2:28 PM    Acceptance, E,TB, VU by BP at 4/23/2019 10:40 AM                   Point: Home exercise program (Done)     Learning Progress Summary           Patient Acceptance, E,TB, VU by BP at 4/23/2019  2:28 PM    Acceptance, E,TB, VU by BP at 4/23/2019 10:40 AM                               User Key     Initials Effective Dates Name Provider Type Discipline     04/03/18 -  Robert Aguilar, PT Physical Therapist PT                PT Recommendation and Plan  Anticipated Discharge Disposition (PT): home with OP services  Planned Therapy Interventions (PT  Eval): bed mobility training, gait training, home exercise program, patient/family education, strengthening, transfer training, stair training  Therapy Frequency (PT Clinical Impression): 2 times/day  Outcome Summary/Treatment Plan (PT)  Daily Summary of Progress (PT): progress toward functional goals as expected, prepare for discharge  Plan for Continued Treatment (PT): Plan to see one additional visit.   Anticipated Equipment Needs at Discharge (PT): bedside commode, front wheeled walker  Anticipated Discharge Disposition (PT): home with OP services  Patient/Family Concerns, Anticipated Discharge Disposition (PT): Anticipate discharge tomorrow morning  Plan of Care Reviewed With: patient  Progress: improving  Outcome Summary: PT: Patient performs supine to/from sit transfers with conditional independence, sit to/from stand transfers with supervision and gait x 120 feet with supervision and use of FWW. Patient ascends/descends 4 curb stairs with use of  FWW with CGA and 5 standard stairs with use of L handrail with CGA. Performed LE HEP. Provided written handout and reviewed. Anticipate d/c home tomorrow morning. Will see one additional visit. Recommend outpatient PT.   Outcome Measures     Row Name 04/23/19 1310 04/23/19 1000 04/23/19 0907       How much help from another person do you currently need...    Turning from your back to your side while in flat bed without using bedrails?  4  -BP  --  3  -BP    Moving from lying on back to sitting on the side of a flat bed without bedrails?  4  -BP  --  3  -BP    Moving to and from a bed to a chair (including a wheelchair)?  3  -BP  --  3  -BP    Standing up from a chair using your arms (e.g., wheelchair, bedside chair)?  3  -BP  --  3  -BP    Climbing 3-5 steps with a railing?  3  -BP  --  3  -BP    To walk in hospital room?  3  -BP  --  3  -BP    AM-PAC 6 Clicks Score  20  -BP  --  18  -BP       How much help from another is currently needed...    Putting on and  taking off regular lower body clothing?  --  3  -EN  --    Bathing (including washing, rinsing, and drying)  --  3  -EN  --    Toileting (which includes using toilet bed pan or urinal)  --  3  -EN  --    Putting on and taking off regular upper body clothing  --  4  -EN  --    Taking care of personal grooming (such as brushing teeth)  --  4  -EN  --    Eating meals  --  4  -EN  --    Score  --  21  -EN  --       Functional Assessment    Outcome Measure Options  AM-PAC 6 Clicks Basic Mobility (PT)  -BP  AM-PAC 6 Clicks Daily Activity (OT)  -EN  AM-PAC 6 Clicks Basic Mobility (PT)  -BP      User Key  (r) = Recorded By, (t) = Taken By, (c) = Cosigned By    Initials Name Provider Type    [place] Al[place], OTR Occupational Therapist    Robert Andres, PT Physical Therapist         Time Calculation:   PT Charges     Row Name 04/23/19 1432 04/23/19 1049          Time Calculation    Start Time  1310  -BP  0907  -BP     Stop Time  1341  -BP  0941  -BP     Time Calculation (min)  31 min  -BP  34 min  -BP     PT Received On  04/23/19  -BP  04/23/19  -BP     PT - Next Appointment  04/24/19  -BP  04/23/19  -BP        Timed Charges    07342 - PT Therapeutic Exercise Minutes  11  -BP  --     72122 - Gait Training Minutes   20  -BP  --       User Key  (r) = Recorded By, (t) = Taken By, (c) = Cosigned By    Initials Name Provider Type    Robert Andres, PT Physical Therapist        Therapy Charges for Today     Code Description Service Date Service Provider Modifiers Qty    59488613376 HC PT EVAL LOW COMPLEXITY 2 4/23/2019 Robert Aguilar, PT GP 1    76925475225 HC PT THER PROC EA 15 MIN 4/23/2019 Robert Aguilar, PT GP 1    15230965676 HC GAIT TRAINING EA 15 MIN 4/23/2019 Robert Agiular, PT GP 1          PT G-Codes  Outcome Measure Options: AM-PAC 6 Clicks Basic Mobility (PT)  AM-PAC 6 Clicks Score: 20  Score: 21    Robert Aguilar PT  4/23/2019

## 2019-04-23 NOTE — PLAN OF CARE
Problem: Patient Care Overview  Goal: Plan of Care Review   04/23/19 1043   Coping/Psychosocial   Plan of Care Reviewed With patient   OTHER   Outcome Summary PT Evaluation Complete: Patient performs supine to sit transfer with supervision, sit to/from stand transfers with SBA and gait x 238 feet with SBA with use of FWW. Patient requires cues for improved gait mechanics however manages AD safely. Initiated LE HEP. Patient would benefit from skilled PT services to address deficits in functional mobility and LE strength/ROM. Plan to see patient 2x/day. Will initiate stair training at next session. Recommend outpatient PT at discharge.

## 2019-04-23 NOTE — NURSING NOTE
Discharge Planning Assessment   Julissa Nur     Patient Name: Nikkie Kennedy  MRN: [id number]  Today's Date: 4/23/2019    Admit Date: 4/22/2019    Discharge Needs Assessment     Row Name 04/23/19 1036       Living Environment    Lives With  spouse    Name(s) of Who Lives With Patient  Samson -     Current Living Arrangements  home/apartment/condo    Primary Care Provided by  self    Provides Primary Care For  no one    Family Caregiver if Needed  spouse    Quality of Family Relationships  helpful;involved;supportive    Able to Return to Prior Arrangements  yes    Living Arrangement Comments  Tri-level home       Resource/Environmental Concerns    Resource/Environmental Concerns  none       Transition Planning    Patient/Family Anticipates Transition to  home with family    Transportation Anticipated  family or friend will provide       Discharge Needs Assessment    Readmission Within the Last 30 Days  no previous admission in last 30 days    Concerns to be Addressed  discharge planning    Equipment Currently Used at Home  none    Anticipated Changes Related to Illness  inability to care for self    Equipment Needed After Discharge  walker, rolling;commode    Discharge Facility/Level of Care Needs  outpatient therapy        Discharge Plan     Row Name 04/23/19 1030       Plan    Plan  Home with outpatient therapy    Patient/Family in Agreement with Plan  yes    Plan Comments  Spoke with Mrs Kennedy at bedside.  She and her  live in a house in [place].  It is a tri-level home.  She does not have home health or DME.  She would like for her therapy to be done at [facility] in [place].  I called Lima at [facility] and the first visit they have available is Monday 4/29 @ 1100.  Called Imelda SAMAYOA and let her know.  Evan will need a rolling wlaker and BSC - called Gan with [facility].  Prior to surgery she was independent with her ADL's and drove herself.  Her  will provide transportation to  therapy.  Her pharmacy is [facility] in [place] and there are no issues with paying for her prescriptions. She has an advanced directive on file here at the hospital.  Will continue to follow.  Facesheet updated.          Destination      No service coordination in this encounter.      Durable Medical Equipment      No service coordination in this encounter.      Dialysis/Infusion      No service coordination in this encounter.      Home Medical Care      No service coordination in this encounter.      Therapy      No service coordination in this encounter.      Community Resources      No service coordination in this encounter.          Demographic Summary     Row Name 04/23/19 1035       General Information    Admission Type  observation    Arrived From  home    Required Notices Provided  Observation Status Notice MUÑOZ signed by pt    Referral Source  admission list    Reason for Consult  discharge planning    Preferred Language  English     Used During This Interaction  no       Contact Information    Permission Granted to Share Info With          Functional Status    No documentation.       Psychosocial    No documentation.       Abuse/Neglect    No documentation.       Legal    No documentation.       Substance Abuse    No documentation.       Patient Forms    No documentation.           Gale Cade RN

## 2019-04-23 NOTE — PLAN OF CARE
Problem: Patient Care Overview  Goal: Plan of Care Review   04/23/19 3056   Coping/Psychosocial   Plan of Care Reviewed With patient   Plan of Care Review   Progress improving   OTHER   Outcome Summary PT: Patient performs supine to/from sit transfers with conditional independence, sit to/from stand transfers with supervision and gait x 120 feet with supervision and use of FWW. Patient ascends/descends 4 curb stairs with use of FWW with CGA and 5 standard stairs with use of L handrail with CGA. Performed LE HEP. Provided written handout and reviewed. Anticipate d/c home tomorrow morning. Will see one additional visit. Recommend outpatient PT.

## 2019-04-23 NOTE — PROGRESS NOTES
Patient: Nikkie Kennedy  Procedure(s) with comments:  left total knee arthroplasty, possible placement of ON-Q pump, and all associated procedures - LEFT TOTAL KNEE ARTHROPLASTY  Anesthesia type: [unfilled]    Patient location: [facility] Surgical Floor  Vitals:    04/22/19 2006 04/22/19 2035 04/22/19 2338 04/23/19 0420   BP: 100/64  123/77 93/55   BP Location: Left arm  Left arm Left arm   Patient Position: Lying  Lying Lying   Pulse: 76 77 79 73   Resp: 12 15 15 14   Temp: 97.8 °F (36.6 °C)  97.7 °F (36.5 °C) 97.9 °F (36.6 °C)   TempSrc: Oral  Oral Oral   SpO2: 98% 98% 95% 95%   Weight:       Height:         Level of consciousness: awake, alert and oriented    Post-anesthesia pain: adequate analgesia  Airway patency: patent  Respiratory: unassisted  Cardiovascular: stable and blood pressure at baseline  Hydration: euvolemic    Anesthetic complications: no

## 2019-04-23 NOTE — PLAN OF CARE
Problem: Patient Care Overview  Goal: Plan of Care Review  Outcome: Ongoing (interventions implemented as appropriate)   04/23/19 1900   Coping/Psychosocial   Plan of Care Reviewed With patient   Plan of Care Review   Progress improving   OTHER   Outcome Summary Ambulates with walker. PO pain meds given. No nausea today. Polar pack in use will in bed. Saline lock IV. Room air. Plan to D/C 4/24/19

## 2019-04-23 NOTE — PROGRESS NOTES
POD# 1 s/p left TKA    Subjective:Patient states she is doing well in regards to left knee, denies fevers chills or sweats overnight, denies any residual numbness or tingling to operative extremity.  No calf pain or swelling. Pain well controlled, ambulating with physical therapy in hallway.     Objective:  Vitals:    04/22/19 2035 04/22/19 2338 04/23/19 0420 04/23/19 1100   BP:  123/77 93/55 94/59   BP Location:  Left arm Left arm Left arm   Patient Position:  Lying Lying Lying   Pulse: 77 79 73 66   Resp: 15 15 14 15   Temp:  97.7 °F (36.5 °C) 97.9 °F (36.6 °C) 98.1 °F (36.7 °C)   TempSrc:  Oral Oral Oral   SpO2: 98% 95% 95% 95%   Weight:       Height:           Results from last 7 days   Lab Units 04/23/19  0428   WBC 10*3/mm3 13.99*   HEMOGLOBIN g/dL 11.1*   HEMATOCRIT % 35.0   PLATELETS 10*3/mm3 183       Results from last 7 days   Lab Units 04/23/19  0428   SODIUM mmol/L 144   POTASSIUM mmol/L 4.2   CHLORIDE mmol/L 111*   CO2 mmol/L 25.6   BUN mg/dL 12   CREATININE mg/dL 0.58   GLUCOSE mg/dL 120*   CALCIUM mg/dL 8.4*       Exam:    Left knee- dressing clean, dry, intact   Flex and extend toes and ankle   No calf pain, negative Homans sign   Positive sensation light touch right foot   Brisk cap refill all digits, palpable dorsalis pedis pulse    Impression: s/p left TKA    Plan:  1. PT/OT- weight-bear as tolerated, ambulation, range of motion  2. Pain control-  oxycodone, Tylenol, Lyrica  3. DVT prophylaxis- aspirin twice a day  4. Wound care- prineo dermabond dressing remain intact until follow-up in office  5. Disposition- home with outpatient PT once medically stable and cleared by PT, concerns regarding steps, lives in tri-level home. Will plan to have her work with PT this afternoon and again in am. Plan to discharge to home in am.

## 2019-04-23 NOTE — PLAN OF CARE
Problem: Patient Care Overview  Goal: Discharge Needs Assessment  Outcome: Ongoing (interventions implemented as appropriate)   04/22/19 1054 04/23/19 1036   Discharge Needs Assessment   Readmission Within the Last 30 Days --  no previous admission in last 30 days   Concerns to be Addressed --  discharge planning   Patient/Family Anticipates Transition to --  home with family   Patient/Family Anticipated Services at Transition none --    Transportation Concerns car, none --    Transportation Anticipated --  family or friend will provide   Anticipated Changes Related to Illness --  inability to care for self   Equipment Needed After Discharge --  walker, rolling;commode   Discharge Facility/Level of Care Needs --  outpatient therapy   Disability   Equipment Currently Used at Home --  none

## 2019-04-23 NOTE — THERAPY DISCHARGE NOTE
Acute Care - Occupational Therapy Initial Eval/Discharge   Julissa Nur     Patient Name: Nikkie Kennedy  : 1950  MRN: [id number]  Today's Date: 2019  Onset of Illness/Injury or Date of Surgery: 19  Date of Referral to OT: 19  Referring Physician: Dr. Quinn      Admit Date: 2019       ICD-10-CM ICD-9-CM   1. Primary osteoarthritis of left knee M17.12 715.16     Patient Active Problem List   Diagnosis   • Primary osteoarthritis of left knee   • Hypothyroidism   • Osteoporosis   • Vaginismus   • Vaginal atrophy   • S/P total knee arthroplasty, left     Past Medical History:   Diagnosis Date   • Abnormal vaginal Pap smear 09/10/1999    ascus   • Abnormal vaginal Pap smear 2000    lgsil   • Arthritis    • Asthma     h/o, no inhaler   • Disease of thyroid gland     hypothyroid   • DJD (degenerative joint disease) of knee     sched TKA left   • History of colposcopy 10/11/1999    chronic endocervicitis   • History of UTI    • Hole in the ear drum, right     d/t tube   • Osteoporosis    • Spinal headache      Past Surgical History:   Procedure Laterality Date   • BREAST CYST ASPIRATION Right     20 yrs ago   • BREAST CYST INCISION AND DRAINAGE  1996,    • CHOLECYSTECTOMY  2016   • COLONOSCOPY     • EAR TUBES     • TOTAL KNEE ARTHROPLASTY Left 2019    Procedure: left total knee arthroplasty, possible placement of ON-Q pump, and all associated procedures;  Surgeon: Dhaval Quinn MD;  Location: [facility];  Service: Orthopedics   • TUBAL ABDOMINAL LIGATION     • VARICOSE VEIN SURGERY      Pt states she had varicose veins removed on both legs (2-3 years ago).          OT ASSESSMENT FLOWSHEET (last 12 hours)      Occupational Therapy Evaluation     Row Name 19 0908                   OT Evaluation Time/Intention    Subjective Information  complains of;pain  -EN        Document Type  evaluation  -EN        Mode of Treatment  occupational therapy  -EN        Patient  Effort  good  -EN           General Information    Patient Profile Reviewed?  yes  -EN        Onset of Illness/Injury or Date of Surgery  04/22/19  -EN        Referring Physician  Dr. Quinn  -EN        Patient Observations  alert;cooperative;agree to therapy  -EN        Patient/Family Observations  Patient reclined in bed, agreed to evaluation.  -EN        Prior Level of Function  independent:;all household mobility;ADL's  -EN        Pertinent History of Current Functional Problem  Patient with history of left knee pain, is now s/p L TKA.  At baseline patient is independent with adls, mobility and IADLs.  -EN        Existing Precautions/Restrictions  fall  -EN        Risks Reviewed  patient:;increased discomfort  -EN        Benefits Reviewed  patient:;improve function;increase independence;increase strength  -EN        Barriers to Rehab  none identified  -EN           Relationship/Environment    Primary Source of Support/Comfort  spouse  -EN        Lives With  spouse  -EN           Resource/Environmental Concerns    Current Living Arrangements  home/apartment/condo  -EN           Home Main Entrance    Number of Stairs, Main Entrance  three platform steps into home  -EN        Stair Railings, Main Entrance  none  -EN           Stairs Within Home, Primary    Stairs, Within Home, Primary  -- tri-level  -EN        Number of Stairs, Within Home, Primary  six up to bedroom and bathroom  -EN        Stair Railings, Within Home, Primary  railing on left side (ascending)  -EN           Cognitive Assessment/Intervention- PT/OT    Follows Commands (Cognition)  WNL  -EN           Bed Mobility Assessment/Treatment    Bed Mobility Assessment/Treatment  supine-sit  -EN        Supine-Sit [place] (Bed Mobility)  conditional independence  -EN        Assistive Device (Bed Mobility)  bed rails;head of bed elevated  -EN           Functional Mobility    Functional Mobility- Ind. Level  standby assist  -EN        Functional Mobility-  Device  rolling walker  -EN           Transfer Assessment/Treatment    Transfer Assessment/Treatment  sit-stand transfer;stand-sit transfer  -EN           Sit-Stand Transfer    Sit-Stand [place] (Transfers)  stand by assist;verbal cues verbal cues for hand placement  -EN        Assistive Device (Sit-Stand Transfers)  walker, front-wheeled  -EN           Stand-Sit Transfer    Stand-Sit [place] (Transfers)  verbal cues;stand by assist  -EN        Assistive Device (Stand-Sit Transfers)  walker, front-wheeled  -EN           Toilet Transfer    [place] Level (Toilet Transfer)  stand by assist  -EN        Assistive Device (Toilet Transfer)  commode, bedside without drop arms;walker, front-wheeled  -EN           Lower Body Dressing Assessment/Training    Lower Body Dressing [place] Level  lower body dressing skills;supervision  -EN           General ROM    GENERAL ROM COMMENTS  B UE AROM WFL  -EN           MMT (Manual Muscle Testing)    General MMT Comments  B UE strength WFL  -EN           Positioning and Restraints    Pre-Treatment Position  in bed  -EN        Post Treatment Position  chair  -EN        In Chair  sitting;with PT  -EN           Pain Assessment    Additional Documentation  Pain Scale: Numbers Pre/Post-Treatment (Group)  -EN           Pain Scale: Numbers Pre/Post-Treatment    Pain Scale: Numbers, Pretreatment  5/10  -EN        Pain Location - Side  Left  -EN        Pain Location  knee  -EN        Pain Intervention(s)  Repositioned  -EN           Wound 04/22/19 0821 Left knee incision    Wound - Properties Group Date first assessed: 04/22/19  -SHIV Time first assessed: 0821  -SHIV Side: Left  -SHIV Location: knee  -SHIV Type: incision  -SHIV       Plan of Care Review    Plan of Care Reviewed With  patient  -EN           Clinical Impression (OT)    Date of Referral to OT  04/22/19  -EN        OT Diagnosis  L TKA  -EN        Functional Level at Time of Evaluation (OT Eval)  Patient performed supine to  sit with conditional independence.  Patient performed sit to stand and mobility with SBA and rolling walker.  Patient performed LBD with supervision.    -EN        Patient/Family Goals Statement (OT Eval)  to return home with out patient P.T.  -EN        Criteria for Skilled Therapeutic Interventions Met (OT Eval)  no problems identified which require skilled intervention  -EN        Therapy Frequency (OT Eval)  evaluation only  -EN        Care Plan Review (OT)  evaluation/treatment results reviewed  -EN        Anticipated Equipment Needs at Discharge (OT)  bedside commode;front wheeled walker  -EN        Anticipated Discharge Disposition (OT)  home with OP services  -EN           Living Environment    Home Accessibility  stairs to enter home;stairs within home  -EN          User Key  (r) = Recorded By, (t) = Taken By, (c) = Cosigned By    Initials Name Effective Dates    Pepper Olea RN 06/16/16 -     Eden Singh OTR 06/22/16 -           Occupational Therapy Education     Title: PT OT SLP Therapies (Resolved)     Topic: Occupational Therapy (Resolved)     Point: ADL training (Resolved)     Description: Instruct learner(s) on proper safety adaptation and remediation techniques during self care or transfers.   Instruct in proper use of assistive devices.    Learning Progress Summary           Patient Acceptance, E, VU by SHAYY at 4/23/2019 10:07 AM    Comment:  Educated on safety during functional transfers.                               User Key     Initials Effective Dates Name Provider Type Discipline    EN 06/22/16 -  Eden Alvarenga OTR Occupational Therapist OT                OT Recommendation and Plan  Outcome Summary/Treatment Plan (OT)  Anticipated Equipment Needs at Discharge (OT): bedside commode, front wheeled walker  Anticipated Discharge Disposition (OT): home with OP services  Therapy Frequency (OT Eval): evaluation only  Plan of Care Review  Plan of Care Reviewed With:  patient  Plan of Care Reviewed With: patient  Outcome Summary: OT evaluation completed.  Patient performed supine to sit with conditional independence.  Patient performed sit to stand and functional mobility with SBA and rolling walker.  Patient performed LBD with supervision.  Patient will need a rolling walker and BSC for home and requested out-patient P.T. in [place].  No skilled OT services needed at this time.         Outcome Measures     Row Name 04/23/19 1000             How much help from another is currently needed...    Putting on and taking off regular lower body clothing?  3  -EN      Bathing (including washing, rinsing, and drying)  3  -EN      Toileting (which includes using toilet bed pan or urinal)  3  -EN      Putting on and taking off regular upper body clothing  4  -EN      Taking care of personal grooming (such as brushing teeth)  4  -EN      Eating meals  4  -EN      Score  21  -EN         Functional Assessment    Outcome Measure Options  AM-PAC 6 Clicks Daily Activity (OT)  -EN        User Key  (r) = Recorded By, (t) = Taken By, (c) = Cosigned By    Initials Name Provider Type    Eden Singh OTR Occupational Therapist          Time Calculation:   Time Calculation- OT     Row Name 04/23/19 1011             Time Calculation- OT    OT Start Time  0907  -EN      OT Stop Time  0926  -EN      OT Time Calculation (min)  19 min  -EN        User Key  (r) = Recorded By, (t) = Taken By, (c) = Cosigned By    Initials Name Provider Type    Eden Singh OTR Occupational Therapist        Therapy Suggested Charges     Code   Minutes Charges    None           Therapy Charges for Today     Code Description Service Date Service Provider Modifiers Qty    [id number]  OT EVAL LOW COMPLEXITY 1 4/23/2019 Eden Alvarenga OTR GO 1               OT Discharge Summary  Anticipated Discharge Disposition (OT): home with OP services    RYAN Rosales  4/23/2019

## 2019-04-23 NOTE — THERAPY EVALUATION
Acute Care - Physical Therapy Initial Evaluation   Julissa Nur     Patient Name: Nikkie Kennedy  : 1950  MRN: [id number]  Today's Date: 2019   Onset of Illness/Injury or Date of Surgery: 19  Date of Referral to PT: 19  Referring Physician: Dr. Quinn      Admit Date: 2019    Visit Dx:     ICD-10-CM ICD-9-CM   1. Status post total left knee replacement Z96.652 V43.65   2. Primary osteoarthritis of left knee M17.12 715.16     Patient Active Problem List   Diagnosis   • Primary osteoarthritis of left knee   • Hypothyroidism   • Osteoporosis   • Vaginismus   • Vaginal atrophy   • S/P total knee arthroplasty, left     Past Medical History:   Diagnosis Date   • Abnormal vaginal Pap smear 09/10/1999    ascus   • Abnormal vaginal Pap smear 2000    lgsil   • Arthritis    • Asthma     h/o, no inhaler   • Disease of thyroid gland     hypothyroid   • DJD (degenerative joint disease) of knee     sched TKA left   • History of colposcopy 10/11/1999    chronic endocervicitis   • History of UTI    • Hole in the ear drum, right     d/t tube   • Osteoporosis    • Spinal headache      Past Surgical History:   Procedure Laterality Date   • BREAST CYST ASPIRATION Right     20 yrs ago   • BREAST CYST INCISION AND DRAINAGE  1996,    • CHOLECYSTECTOMY  2016   • COLONOSCOPY     • EAR TUBES     • TOTAL KNEE ARTHROPLASTY Left 2019    Procedure: left total knee arthroplasty, possible placement of ON-Q pump, and all associated procedures;  Surgeon: Dhaval Quinn MD;  Location: [facility];  Service: Orthopedics   • TUBAL ABDOMINAL LIGATION     • VARICOSE VEIN SURGERY      Pt states she had varicose veins removed on both legs (2-3 years ago).        PT ASSESSMENT (last 12 hours)      Physical Therapy Evaluation     Row Name 19 0907          PT Evaluation Time/Intention    Subjective Information  complains of;pain  -BP     Document Type  evaluation  -BP     Mode of Treatment  physical  therapy  -BP     Patient Effort  good  -BP     Row Name 04/23/19 0907          General Information    Patient Profile Reviewed?  yes  -BP     Onset of Illness/Injury or Date of Surgery  04/22/19  -BP     Referring Physician  Dr. Quinn  -BP     Patient Observations  alert;cooperative;agree to therapy  -BP     Patient/Family Observations  Patient reclined in bedside chair. Agreeable to PT evaluation.   -BP     Prior Level of Function  independent:;gait;transfer;all household mobility;community mobility;home management;ADL's;bed mobility  -BP     Equipment Currently Used at Home  -- owns 3 wheeled walker did not use   -BP     Pertinent History of Current Functional Problem  Patient admitted s/p L TKA. Patient independnt with all mobility prior to surgery.   -BP     Existing Precautions/Restrictions  fall  -BP     Risks Reviewed  patient:;LOB;increased discomfort  -BP     Benefits Reviewed  patient:;improve function;increase independence;increase strength  -BP     Barriers to Rehab  none identified  -BP     Row Name 04/23/19 0907          Relationship/Environment    Primary Source of Support/Comfort  spouse  -BP     Lives With  spouse  -BP     Row Name 04/23/19 0907          Resource/Environmental Concerns    Current Living Arrangements  home/apartment/condo  -BP     Row Name 04/23/19 0907          Home Main Entrance    Stairs Comment, Main Entrance  3 curb stairs to enter home. No hand rails   -BP     Row Name 04/23/19 0907          Stairs Within Home, Primary    Stair Railings, Within Home, Primary  railing on left side (ascending)  -BP     Stairs Comment, Within Home, Primary  Patient lives in a tri level home. Patient reports one curb stair to enter kitchen and 6 stairs with L ascending hand rail.  -BP     Row Name 04/23/19 0907          Cognitive Assessment/Interventions    Additional Documentation  Cognitive Assessment/Intervention (Group)  -BP     Row Name 04/23/19 0907          Cognitive  Assessment/Intervention- PT/OT    Orientation Status (Cognition)  oriented x 4  -BP     Follows Commands (Cognition)  WNL  -BP     Safety Deficit (Cognitive)  -- WNL  -BP     Personal Safety Interventions  gait belt;nonskid shoes/slippers when out of bed  -BP     Row Name 04/23/19 0907          Safety Issues, Functional Mobility    Comment, Safety Issues/Impairments (Mobility)  WNL   -BP     Row Name 04/23/19 0907          Bed Mobility Assessment/Treatment    Bed Mobility Assessment/Treatment  supine-sit  -BP     Supine-Sit [place] (Bed Mobility)  conditional independence  -BP     Assistive Device (Bed Mobility)  bed rails;head of bed elevated  -BP     Row Name 04/23/19 0907          Transfer Assessment/Treatment    Transfer Assessment/Treatment  sit-stand transfer;stand-sit transfer  -BP     Comment (Transfers)  Verbal cues for hand placement required.   -BP     Sit-Stand [place] (Transfers)  stand by assist  -BP     Stand-Sit [place] (Transfers)  stand by assist;verbal cues  -BP     [place] Level (Toilet Transfer)  stand by assist  -BP     Assistive Device (Toilet Transfer)  walker, front-wheeled bedside commode over toilet   -BP     Row Name 04/23/19 0907          Sit-Stand Transfer    Assistive Device (Sit-Stand Transfers)  walker, front-wheeled  -BP     Row Name 04/23/19 0907          Stand-Sit Transfer    Assistive Device (Stand-Sit Transfers)  walker, front-wheeled  -BP     Row Name 04/23/19 0907          Gait/Stairs Assessment/Training    [place] Level (Gait)  stand by assist;verbal cues  -BP     Assistive Device (Gait)  walker, front-wheeled  -BP     Distance in Feet (Gait)  238  -BP     Pattern (Gait)  swing-through  -BP     Deviations/Abnormal Patterns (Gait)  gait speed decreased;stride length decreased  -BP     Bilateral Gait Deviations  forward flexed posture  -BP     Comment (Gait/Stairs)  Patient requires cues for upright posture.   -BP     Row Name 04/23/19 0907           General ROM    GENERAL ROM COMMENTS  R LE AROM WFL. L ankle AROM WFL.   -BP     Row Name 04/23/19 0907          MMT (Manual Muscle Testing)    General MMT Comments  R LE strength functional. L LE strength not formerly assessed. Patient able to complete L LAQ  -BP     Row Name 04/23/19 0907          Motor Assessment/Intervention    Additional Documentation  Therapeutic Exercise (Group)  -BP     Row Name 04/23/19 0907          Therapeutic Exercise    Therapeutic Exercise  supine, lower extremities;seated, lower extremities  -BP     Additional Documentation  Therapeutic Exercise (Row)  -BP     Row Name 04/23/19 0907          Lower Extremity Seated Therapeutic Exercise    Performed, Seated Lower Extremity (Therapeutic Exercise)  hip abduction/adduction;LAQ (long arc quad), knee extension  -BP     Sets/Reps Detail, Seated Lower Extremity (Therapeutic Exercise)  1/10-L LE   -BP     Row Name 04/23/19 0907          Lower Extremity Supine Therapeutic Exercise    Performed, Supine Lower Extremity (Therapeutic Exercise)  hip abduction/adduction;ankle dorsiflexion/plantarflexion;SAQ (short arc quad) over bolster;SLR (straight leg raise);quadriceps sets;heel slides;gluteal sets  -BP     Sets/Reps Detail, Supine Lower Extremity (Therapeutic Exercise)  1/10-L LE   -BP     Comment, Supine Lower Extremity (Therapeutic Exercise)  Performed reclined in bedside chair.   -BP     Row Name 04/23/19 0907          Sensory Assessment/Intervention    Sensory General Assessment  -- Patient denies numbness and tingling B LE's  -BP     Row Name 04/23/19 0907          Pain Assessment    Additional Documentation  Pain Scale: Numbers Pre/Post-Treatment (Group)  -BP     Row Name 04/23/19 0907          Pain Scale: Numbers Pre/Post-Treatment    Pain Scale: Numbers, Pretreatment  5/10  -BP     Pain Scale: Numbers, Post-Treatment  5/10  -BP     Pain Location - Side  Left  -BP     Pain Location - Orientation  incisional  -BP     Pain Location  knee  -BP      Pain Intervention(s)  Repositioned;Cold applied;Ambulation/increased activity  -BP     Row Name             Wound 04/22/19 0821 Left knee incision    Wound - Properties Group Date first assessed: 04/22/19  -SHIV Time first assessed: 0821  -SHIV Side: Left  -SHIV Location: knee  -SHIV Type: incision  -SHIV    Row Name 04/23/19 0907          Plan of Care Review    Plan of Care Reviewed With  patient;spouse  -BP     Row Name 04/23/19 0907          Physical Therapy Clinical Impression    Date of Referral to PT  04/22/19  -BP     PT Diagnosis (PT Clinical Impression)  Decreased functional mobility   -BP     Criteria for Skilled Interventions Met (PT Clinical Impression)  yes;treatment indicated  -BP     Rehab Potential (PT Clinical Summary)  good, to achieve stated therapy goals  -BP     Predicted Duration of Therapy (PT)  2 days   -BP     Care Plan Review (PT)  evaluation/treatment results reviewed;care plan/treatment goals reviewed;risks/benefits reviewed  -BP     Row Name 04/23/19 0907          Physical Therapy Goals    Transfer Goal Selection (PT)  transfer, PT goal 1  -BP     Gait Training Goal Selection (PT)  gait training, PT goal 1  -BP     Stairs Goal Selection (PT)  stairs, PT goal 1;stairs, PT goal 2  -BP     Additional Documentation  Stairs Goal Selection (PT) (Row)  -BP     Row Name 04/23/19 0907          Transfer Goal 1 (PT)    Activity/Assistive Device (Transfer Goal 1, PT)  sit-to-stand/stand-to-sit  -BP     [place] Level/Cues Needed (Transfer Goal 1, PT)  supervision required  -BP     Time Frame (Transfer Goal 1, PT)  2 days  -BP     Progress/Outcome (Transfer Goal 1, PT)  goal ongoing  -BP     Row Name 04/23/19 0907          Gait Training Goal 1 (PT)    Activity/Assistive Device (Gait Training Goal 1, PT)  gait (walking locomotion);walker, rolling  -BP     [place] Level (Gait Training Goal 1, PT)  supervision required  -BP     Distance (Gait Goal 1, PT)  250  -BP     Time Frame (Gait Training Goal 1,  PT)  2 days  -BP     Progress/Outcome (Gait Training Goal 1, PT)  goal ongoing  -BP     Row Name 04/23/19 0907          Stairs Goal 1 (PT)    Activity/Assistive Device (Stairs Goal 1, PT)  descending stairs;ascending stairs  -BP     [place] Level/Cues Needed (Stairs Goal 1, PT)  contact guard assist;verbal cues required  -BP     Number of Stairs (Stairs Goal 1, PT)  3 curb stairs   -BP     Time Frame (Stairs Goal 1, PT)  2 days  -BP     Progress/Outcome (Stairs Goal 1, PT)  goal ongoing  -BP     Row Name 04/23/19 0907          Stairs Goal 2 (PT)    Activity/Assistive Device (Stairs Goal 2, PT)  ascending stairs;descending stairs  -BP     [place] Level/Cues Needed (Stairs Goal 2, PT)  contact guard assist  -BP     Number of Stairs (Stairs Goal 2, PT)  6 one handrail   -BP     Time Frame (Stairs Goal 2, PT)  2 days  -BP     Progress/Outcome (Stairs Goal 2, PT)  goal ongoing  -BP     Row Name 04/23/19 0907          Patient Education Goal (PT)    Activity (Patient Education Goal, PT)  LE HEP   -BP     [place]/Cues/Accuracy (Memory Goal 2, PT)  demonstrates adequately;verbalizes understanding  -BP     Time Frame (Patient Education Goal, PT)  2 days  -BP     Progress/Outcome (Patient Education Goal, PT)  goal ongoing  -BP     Row Name 04/23/19 0907          Positioning and Restraints    Pre-Treatment Position  in bed  -BP     Post Treatment Position  chair  -BP     In Chair  notified nsg;reclined;call light within reach;encouraged to call for assist  -BP     Row Name 04/23/19 0907          Living Environment    Home Accessibility  stairs to enter home;stairs within home  -BP       User Key  (r) = Recorded By, (t) = Taken By, (c) = Cosigned By    Initials Name Provider Type    Pepper Olea RN Registered Nurse    BP Robert Aguilar, PT Physical Therapist        Physical Therapy Education     Title: PT OT SLP Therapies (Done)     Topic: Physical Therapy (Done)     Point: Mobility training (Done)      Learning Progress Summary           Patient Acceptance, E,TB, VU by  at 4/23/2019 10:40 AM                   Point: Home exercise program (Done)     Learning Progress Summary           Patient Acceptance, E,TB, VU by  at 4/23/2019 10:40 AM                               User Key     Initials Effective Dates Name Provider Type Discipline     04/03/18 -  Robert Aguilar, PT Physical Therapist PT              PT Recommendation and Plan  Anticipated Discharge Disposition (PT): home with OP services  Planned Therapy Interventions (PT Eval): bed mobility training, gait training, home exercise program, patient/family education, strengthening, transfer training, stair training  Therapy Frequency (PT Clinical Impression): 2 times/day  Outcome Summary/Treatment Plan (PT)  Anticipated Equipment Needs at Discharge (PT): bedside commode, front wheeled walker  Anticipated Discharge Disposition (PT): home with OP services  Plan of Care Reviewed With: patient  Outcome Summary: PT Evaluation Complete: Patient performs supine to sit transfer with supervision, sit to/from stand transfers with SBA and gait x 238 feet with SBA with use of FWW. Patient requires cues for improved gait mechanics however manages AD safely. Initiated LE HEP. Patient would benefit from skilled PT services to address deficits in functional mobility and LE strength/ROM. Plan to see patient 2x/day. Will initiate stair training at next session. Recommend outpatient PT at discharge.   Outcome Measures     Row Name 04/23/19 1000 04/23/19 0907          How much help from another person do you currently need...    Turning from your back to your side while in flat bed without using bedrails?  --  3  -BP     Moving from lying on back to sitting on the side of a flat bed without bedrails?  --  3  -BP     Moving to and from a bed to a chair (including a wheelchair)?  --  3  -BP     Standing up from a chair using your arms (e.g., wheelchair, bedside chair)?  --  3   -BP     Climbing 3-5 steps with a railing?  --  3  -BP     To walk in hospital room?  --  3  -BP     AM-PAC 6 Clicks Score  --  18  -BP        How much help from another is currently needed...    Putting on and taking off regular lower body clothing?  3  -EN  --     Bathing (including washing, rinsing, and drying)  3  -EN  --     Toileting (which includes using toilet bed pan or urinal)  3  -EN  --     Putting on and taking off regular upper body clothing  4  -EN  --     Taking care of personal grooming (such as brushing teeth)  4  -EN  --     Eating meals  4  -EN  --     Score  21  -EN  --        Functional Assessment    Outcome Measure Options  AM-PAC 6 Clicks Daily Activity (OT)  -EN  AM-PAC 6 Clicks Basic Mobility (PT)  -BP       User Key  (r) = Recorded By, (t) = Taken By, (c) = Cosigned By    Initials Name Provider Type    EN Eden Alvarenga, OTR Occupational Therapist    Robert Andres, PT Physical Therapist         Time Calculation:   PT Charges     Row Name 04/23/19 1049             Time Calculation    Start Time  0907  -BP      Stop Time  0941  -BP      Time Calculation (min)  34 min  -BP      PT Received On  04/23/19  -BP      PT - Next Appointment  04/23/19  -BP        User Key  (r) = Recorded By, (t) = Taken By, (c) = Cosigned By    Initials Name Provider Type    Robert Andres, PT Physical Therapist        Therapy Charges for Today     Code Description Service Date Service Provider Modifiers Qty    85039271873 HC PT EVAL LOW COMPLEXITY 2 4/23/2019 Robert Aguilar, PT GP 1          PT G-Codes  Outcome Measure Options: AM-PAC 6 Clicks Daily Activity (OT)  AM-PAC 6 Clicks Score: 18  Score: 21      Robert Aguilar PT  4/23/2019

## 2019-04-24 VITALS
OXYGEN SATURATION: 93 % | WEIGHT: 120.4 LBS | HEIGHT: 62 IN | TEMPERATURE: 98.5 F | RESPIRATION RATE: 18 BRPM | DIASTOLIC BLOOD PRESSURE: 66 MMHG | SYSTOLIC BLOOD PRESSURE: 100 MMHG | BODY MASS INDEX: 22.16 KG/M2 | HEART RATE: 65 BPM

## 2019-04-24 PROCEDURE — 25010000002 KETOROLAC TROMETHAMINE PER 15 MG: Performed by: ORTHOPAEDIC SURGERY

## 2019-04-24 PROCEDURE — 97116 GAIT TRAINING THERAPY: CPT

## 2019-04-24 PROCEDURE — G0378 HOSPITAL OBSERVATION PER HR: HCPCS

## 2019-04-24 RX ORDER — OXYCODONE HYDROCHLORIDE AND ACETAMINOPHEN 5; 325 MG/1; MG/1
TABLET ORAL
Qty: 36 TABLET | Refills: 0 | Status: SHIPPED | OUTPATIENT
Start: 2019-04-24 | End: 2019-05-07

## 2019-04-24 RX ORDER — PROMETHAZINE HYDROCHLORIDE 12.5 MG/1
TABLET ORAL
Qty: 30 TABLET | Refills: 0 | OUTPATIENT
Start: 2019-04-24 | End: 2019-12-23

## 2019-04-24 RX ORDER — ACETAMINOPHEN 500 MG
1000 TABLET ORAL 3 TIMES DAILY
Start: 2019-04-24

## 2019-04-24 RX ORDER — PSEUDOEPHEDRINE HCL 30 MG
100 TABLET ORAL 2 TIMES DAILY PRN
Qty: 60 EACH | Refills: 0 | OUTPATIENT
Start: 2019-04-24 | End: 2019-12-23

## 2019-04-24 RX ADMIN — ASPIRIN 325 MG: 325 TABLET, DELAYED RELEASE ORAL at 08:42

## 2019-04-24 RX ADMIN — MELOXICAM 7.5 MG: 7.5 TABLET ORAL at 08:42

## 2019-04-24 RX ADMIN — KETOROLAC TROMETHAMINE 15 MG: 30 INJECTION, SOLUTION INTRAMUSCULAR; INTRAVENOUS at 00:25

## 2019-04-24 RX ADMIN — FAMOTIDINE 40 MG: 20 TABLET, FILM COATED ORAL at 08:42

## 2019-04-24 RX ADMIN — LEVOTHYROXINE SODIUM 50 MCG: 50 TABLET ORAL at 08:42

## 2019-04-24 RX ADMIN — ACETAMINOPHEN 1000 MG: 500 TABLET, FILM COATED ORAL at 08:42

## 2019-04-24 RX ADMIN — CETIRIZINE HYDROCHLORIDE 10 MG: 10 TABLET, FILM COATED ORAL at 08:42

## 2019-04-24 RX ADMIN — OXYCODONE HYDROCHLORIDE 5 MG: 5 TABLET ORAL at 08:41

## 2019-04-24 NOTE — THERAPY DISCHARGE NOTE
Acute Care - Physical Therapy Treatment Note/Discharge  [place]     Patient Name: Nikkie Kennedy  : 1950  MRN: [id number]  Today's Date: 2019  Onset of Illness/Injury or Date of Surgery: 19  Date of Referral to PT: 19  Referring Physician: Dr. Quinn    Admit Date: 2019    Visit Dx:    ICD-10-CM ICD-9-CM   1. Status post total left knee replacement Z96.652 V43.65   2. Primary osteoarthritis of left knee M17.12 715.16     Patient Active Problem List   Diagnosis   • Primary osteoarthritis of left knee   • Hypothyroidism   • Osteoporosis   • Vaginismus   • Vaginal atrophy   • S/P total knee arthroplasty, left       Physical Therapy Education     Title: PT OT SLP Therapies (Resolved)     Topic: Physical Therapy (Resolved)     Point: Mobility training (Resolved)     Learning Progress Summary           Patient Acceptance, E,TB, VU by BP at 2019  2:28 PM    Acceptance, E,TB, VU by BP at 2019 10:40 AM                   Point: Home exercise program (Resolved)     Learning Progress Summary           Patient Acceptance, E,TB, VU by BP at 2019  2:28 PM    Acceptance, E,TB, VU by BP at 2019 10:40 AM                               User Key     Initials Effective Dates Name Provider Type Discipline     18 -  Robert Aguilar, PT Physical Therapist PT              Rehab Goal Summary     Row Name 19 0935             Transfer Goal 1 (PT)    Activity/Assistive Device (Transfer Goal 1, PT)  sit-to-stand/stand-to-sit  -BP      [place] Level/Cues Needed (Transfer Goal 1, PT)  supervision required  -BP      Time Frame (Transfer Goal 1, PT)  2 days  -BP      Progress/Outcome (Transfer Goal 1, PT)  goal met  -BP         Gait Training Goal 1 (PT)    Activity/Assistive Device (Gait Training Goal 1, PT)  gait (walking locomotion);walker, rolling  -BP      [place] Level (Gait Training Goal 1, PT)  supervision required  -BP      Distance (Gait Goal 1, PT)  250  -BP       Time Frame (Gait Training Goal 1, PT)  2 days  -BP      Progress/Outcome (Gait Training Goal 1, PT)  goal met  -BP         Stairs Goal 1 (PT)    Activity/Assistive Device (Stairs Goal 1, PT)  descending stairs;ascending stairs  -BP      [place] Level/Cues Needed (Stairs Goal 1, PT)  contact guard assist;verbal cues required  -BP      Number of Stairs (Stairs Goal 1, PT)  3 curb stairs   -BP      Time Frame (Stairs Goal 1, PT)  2 days  -BP      Progress/Outcome (Stairs Goal 1, PT)  goal met  -BP         Stairs Goal 2 (PT)    Activity/Assistive Device (Stairs Goal 2, PT)  ascending stairs;descending stairs  -BP      [place] Level/Cues Needed (Stairs Goal 2, PT)  contact guard assist  -BP      Number of Stairs (Stairs Goal 2, PT)  6 one handrail   -BP      Time Frame (Stairs Goal 2, PT)  2 days  -BP      Progress/Outcome (Stairs Goal 2, PT)  goal partially met 5 stairs with one handrail with CGA  -BP         Patient Education Goal (PT)    Activity (Patient Education Goal, PT)  LE HEP   -BP      [place]/Cues/Accuracy (Memory Goal 2, PT)  demonstrates adequately;verbalizes understanding  -BP      Time Frame (Patient Education Goal, PT)  2 days  -BP      Progress/Outcome (Patient Education Goal, PT)  goal met  -BP        User Key  (r) = Recorded By, (t) = Taken By, (c) = Cosigned By    Initials Name Provider Type Discipline    BP Robert Aguilar, PT Physical Therapist PT        Therapy Treatment  Rehabilitation Treatment Summary     Row Name 04/24/19 0935             Treatment Time/Intention    Discipline  physical therapist  -BP      Document Type  therapy note (daily note);discharge treatment  -BP      Subjective Information  no complaints  -BP      Mode of Treatment  physical therapy  -BP      Patient/Family Observations  Patient sitting EOB with ice to L knee. Patient agreeable to PT.   -BP      Care Plan Review  risks/benefits reviewed  -BP      Patient Effort  excellent  -BP      Patient Response  to Treatment  Patient has no concerns for return home. She reports she performed LE HEP independently without question or concern. Patient being discharged home this morning.   -BP      Recorded by [BP] Robert Aguilar, PT 04/24/19 1039      Row Name 04/24/19 0935             Cognitive Assessment/Intervention- PT/OT    Personal Safety Interventions  gait belt;nonskid shoes/slippers when out of bed  -BP      Recorded by [BP] Robert Aguilar, PT 04/24/19 1039      Row Name 04/24/19 0935             Safety Issues, Functional Mobility    Comment, Safety Issues/Impairments (Mobility)  WNL  -BP      Recorded by [BP] Robert Aguilar, PT 04/24/19 1039      Row Name 04/24/19 0935             Bed Mobility Assessment/Treatment    Comment (Bed Mobility)  Patient reports independence with bed mobility   -BP      Recorded by [BP] Robert Aguilar, PT 04/24/19 1039      Row Name 04/24/19 0935             Transfer Assessment/Treatment    Transfer Assessment/Treatment  sit-stand transfer;stand-sit transfer  -BP      Comment (Transfers)  Patient does not require verbal cues for hand placement.   -BP      Recorded by [BP] Robert Aguilar, PT 04/24/19 1039      Row Name 04/24/19 0935             Sit-Stand Transfer    Sit-Stand [place] (Transfers)  conditional independence  -BP      Assistive Device (Sit-Stand Transfers)  walker, front-wheeled  -BP      Recorded by [BP] Robert Aguilar, PT 04/24/19 1039      Row Name 04/24/19 0935             Stand-Sit Transfer    Stand-Sit [place] (Transfers)  conditional independence  -BP      Assistive Device (Stand-Sit Transfers)  walker, front-wheeled  -BP      Recorded by [BP] Robert Aguilar, PT 04/24/19 1039      Row Name 04/24/19 0935             Gait/Stairs Assessment/Training    [place] Level (Gait)  conditional independence  -BP      Assistive Device (Gait)  walker, front-wheeled  -BP      Distance in Feet (Gait)  344  -BP      Pattern (Gait)  swing-through  -BP       Deviations/Abnormal Patterns (Gait)  gait speed decreased;stride length decreased  -BP      Bilateral Gait Deviations  forward flexed posture  -BP      Comment (Gait/Stairs)  Patient demonstrates swing through gait and safe use of AD without need for verbal cues.   -BP      Recorded by [BP] Robert Aguilar, PT 04/24/19 1039      Row Name 04/24/19 0935             Positioning and Restraints    Pre-Treatment Position  in bed  -BP      Post Treatment Position  bed  -BP      In Bed  sitting EOB;call light within reach;encouraged to call for assist;notified nsg  -BP      Recorded by [BP] Robert Aguilar, PT 04/24/19 1039      Row Name 04/24/19 0935             Pain Scale: Numbers Pre/Post-Treatment    Pre/Post Treatment Pain Comment  Patient does not complain of pain  -BP      Recorded by [BP] Robert Aguilar, PT 04/24/19 1039      Row Name                Wound 04/22/19 0821 Left knee incision    Wound - Properties Group Date first assessed: 04/22/19 [SHIV] Time first assessed: 0821 [SHIV] Side: Left [SHIV] Location: knee [SHIV] Type: incision [SHIV] Recorded by:  [SHIV] Pepper Gannon RN 04/22/19 0821    Row Name 04/24/19 0935             Plan of Care Review    Plan of Care Reviewed With  patient  -BP      Recorded by [BP] Robert Aguilar, PT 04/24/19 1039      Row Name 04/24/19 0935             Outcome Summary/Treatment Plan (PT)    Anticipated Discharge Disposition (PT)  home with OP services  -BP      Reason for Discharge (PT Discharge Summary)  patient discharged from this facility;patient met all goals and outcomes  -BP      Recorded by [BP] Robert Aguilar, PT 04/24/19 1039        User Key  (r) = Recorded By, (t) = Taken By, (c) = Cosigned By    Initials Name Effective Dates Discipline    SHIV Pepper Gannon RN 06/16/16 -  Nurse    Robert Andres, PT 04/03/18 -  PT        Wound 04/22/19 0821 Left knee incision (Active)   Dressing Appearance dry;intact;no drainage 4/24/2019  9:06 AM   Closure [id number]  4/24/2019  9:06 AM   Base other (see comments) 4/24/2019  9:06 AM   Drainage Amount none 4/24/2019  9:06 AM       PT Recommendation and Plan  Anticipated Discharge Disposition (PT): home with OP services  Planned Therapy Interventions (PT Eval): bed mobility training, gait training, home exercise program, patient/family education, strengthening, transfer training, stair training  Therapy Frequency (PT Clinical Impression): 2 times/day  Outcome Summary/Treatment Plan (PT)  Daily Summary of Progress (PT): progress toward functional goals as expected, prepare for discharge  Plan for Continued Treatment (PT): Plan to see one additional visit.   Anticipated Equipment Needs at Discharge (PT): bedside commode, front wheeled walker  Anticipated Discharge Disposition (PT): home with OP services  Patient/Family Concerns, Anticipated Discharge Disposition (PT): Anticipate discharge tomorrow morning  Reason for Discharge (PT Discharge Summary): patient discharged from this facility, patient met all goals and outcomes  Plan of Care Reviewed With: patient  Progress: improving  Outcome Summary: PT: Patient performs sit to/from stand transfers with conditional independence and gait x 344 feet with conditional independence with use of FWW. Patient reports independence witih LE HEP with use of written handout. Patient to follow up with outpatient PT on Friday 4/26. Patient met all goals and will be discharge home this morning.     Outcome Measures     Row Name 04/24/19 0935 04/23/19 1310 04/23/19 1000       How much help from another person do you currently need...    Turning from your back to your side while in flat bed without using bedrails?  4  -BP  4  -BP  --    Moving from lying on back to sitting on the side of a flat bed without bedrails?  4  -BP  4  -BP  --    Moving to and from a bed to a chair (including a wheelchair)?  4  -BP  3  -BP  --    Standing up from a chair using your arms (e.g., wheelchair, bedside chair)?  4   -BP  3  -BP  --    Climbing 3-5 steps with a railing?  3  -BP  3  -BP  --    To walk in hospital room?  4  -BP  3  -BP  --    AM-PAC 6 Clicks Score  23  -BP  20  -BP  --       How much help from another is currently needed...    Putting on and taking off regular lower body clothing?  --  --  3  -EN    Bathing (including washing, rinsing, and drying)  --  --  3  -EN    Toileting (which includes using toilet bed pan or urinal)  --  --  3  -EN    Putting on and taking off regular upper body clothing  --  --  4  -EN    Taking care of personal grooming (such as brushing teeth)  --  --  4  -EN    Eating meals  --  --  4  -EN    Score  --  --  21  -EN       Functional Assessment    Outcome Measure Options  AM-PAC 6 Clicks Basic Mobility (PT)  -BP  AM-PAC 6 Clicks Basic Mobility (PT)  -BP  AM-PAC 6 Clicks Daily Activity (OT)  -EN    Row Name 04/23/19 0907             How much help from another person do you currently need...    Turning from your back to your side while in flat bed without using bedrails?  3  -BP      Moving from lying on back to sitting on the side of a flat bed without bedrails?  3  -BP      Moving to and from a bed to a chair (including a wheelchair)?  3  -BP      Standing up from a chair using your arms (e.g., wheelchair, bedside chair)?  3  -BP      Climbing 3-5 steps with a railing?  3  -BP      To walk in hospital room?  3  -BP      AM-PAC 6 Clicks Score  18  -BP         Functional Assessment    Outcome Measure Options  AM-PAC 6 Clicks Basic Mobility (PT)  -BP        User Key  (r) = Recorded By, (t) = Taken By, (c) = Cosigned By    Initials Name Provider Type    EN Eden Alvarenga OTR Occupational Therapist    BP Robert Aguilar, PT Physical Therapist           Time Calculation:   PT Charges     Row Name 04/24/19 1043             Time Calculation    Start Time  0935  -BP      Stop Time  0949  -BP      Time Calculation (min)  14 min  -BP      PT Received On  04/24/19  -BP         Timed Charges     19139 - Gait Training Minutes   14  -BP        User Key  (r) = Recorded By, (t) = Taken By, (c) = Cosigned By    Initials Name Provider Type    BP Robert Aguilar, PT Physical Therapist        Therapy Charges for Today     Code Description Service Date Service Provider Modifiers Qty    74839917295 HC PT EVAL LOW COMPLEXITY 2 4/23/2019 Robert Aguilar, PT GP 1    98177414359 HC PT THER PROC EA 15 MIN 4/23/2019 Robert Aguilar, PT GP 1    34344202432 HC GAIT TRAINING EA 15 MIN 4/23/2019 Robert Aguilar, PT GP 1    38387748245 HC GAIT TRAINING EA 15 MIN 4/24/2019 Robert Aguilar, PT GP 1          PT G-Codes  Outcome Measure Options: AM-PAC 6 Clicks Basic Mobility (PT)  AM-PAC 6 Clicks Score: 23  Score: 21    PT Discharge Summary  Anticipated Discharge Disposition (PT): home with OP services  Reason for Discharge: Discharge from facility  Outcomes Achieved: Patient able to partially acheive established goals  Discharge Destination: Home with outpatient services    Robert Aguilar, BRENNAN  4/24/2019

## 2019-04-24 NOTE — PLAN OF CARE
Problem: Patient Care Overview  Goal: Plan of Care Review  Outcome: Ongoing (interventions implemented as appropriate)   04/24/19 3605   Coping/Psychosocial   Plan of Care Reviewed With patient;spouse   Plan of Care Review   Progress improving   OTHER   Outcome Summary ambulates well with walker - no reports of N/V - polar pack utilized when in bed - pt anticipating going home today     Goal: Individualization and Mutuality  Outcome: Ongoing (interventions implemented as appropriate)      Problem: Pain, Acute (Adult)  Goal: Identify Related Risk Factors and Signs and Symptoms  Outcome: Ongoing (interventions implemented as appropriate)    Goal: Acceptable Pain Control/Comfort Level  Outcome: Ongoing (interventions implemented as appropriate)

## 2019-04-24 NOTE — DISCHARGE SUMMARY
Orthopedic Discharge Summary      Patient: Nikkie Kennedy      YOB: 1950    Medical Record Number: [id number]    Attending Physician: Dhaval Quinn MD  Consulting Physician(s):   Date of Admission: 4/22/2019  5:34 AM  Date of Discharge: 04/24/2019      Patient Active Problem List   Diagnosis   • Primary osteoarthritis of left knee   • Hypothyroidism   • Osteoporosis   • Vaginismus   • Vaginal atrophy   • S/P total knee arthroplasty, left     Status Post: TOTAL KNEE ARTHROPLASTY AND ALL ASSOCIATED PROCEDURES      Allergies   Allergen Reactions   • Hydrocodone Nausea And Vomiting   • Codeine Nausea And Vomiting   • Omnicef [Cefdinir] Hives   • Oxycodone Nausea And Vomiting       Current Medications:     Discharge Medications      New Medications      Instructions Start Date   aspirin 325 MG EC tablet   325 mg, Oral, Every 12 Hours Scheduled      docusate sodium 100 MG capsule   100 mg, Oral, 2 Times Daily PRN         Changes to Medications      Instructions Start Date   estradiol 0.1 MG/GM vaginal cream  Commonly known as:  ESTRACE  What changed:  additional instructions   1 g, Vaginal, 2 Times Weekly      meclizine 25 MG tablet  Commonly known as:  ANTIVERT  What changed:    · how much to take  · how to take this  · additional instructions   1-2 tablets every 6 hours when necessary dizziness/vertigo.      promethazine 12.5 MG tablet  Commonly known as:  PHENERGAN  What changed:    · how much to take  · how to take this  · when to take this  · reasons to take this  · additional instructions   1-2 tablets as needed every 6 hours, prn nausea, vomiting         Continue These Medications      Instructions Start Date   ALLERGY SERUM INJECTION   Subcutaneous, Every 7 Days      azelastine 0.1 % nasal spray  Commonly known as:  ASTELIN   1 spray, Nasal, As Needed      CALCIUM PO   1 tablet, Oral, Daily      CIPRODEX OT   4 drops, Otic, 2 Times Daily      GLUCOSAMINE-CHONDROITIN PO   1 tablet, Oral,  Daily      levocetirizine 5 MG tablet  Commonly known as:  XYZAL   5 mg, Oral, Every Evening      levothyroxine 50 MCG tablet  Commonly known as:  SYNTHROID, LEVOTHROID   50 mcg, Oral, Daily      multivitamin with minerals tablet tablet   1 tablet, Oral, Daily      PROBIOTIC PO   1 capsule, Oral, Daily      STAHIST AD 25-60 MG tablet  Generic drug:  Chlorcyclizine-Pseudoephed   0.5 tablets, Oral, 2 Times Daily         Stop These Medications    meloxicam 7.5 MG tablet  Commonly known as:  MOBIC        ASK your doctor about these medications      Instructions Start Date   FISH OIL PO   1 capsule, Oral, Daily             Past Medical History:   Diagnosis Date   • Abnormal vaginal Pap smear 09/10/1999    ascus   • Abnormal vaginal Pap smear 11/06/2000    lgsil   • Arthritis    • Asthma     h/o, no inhaler   • Disease of thyroid gland     hypothyroid   • DJD (degenerative joint disease) of knee     sched TKA left   • History of colposcopy 10/11/1999    chronic endocervicitis   • History of UTI    • Hole in the ear drum, right     d/t tube   • Osteoporosis    • Spinal headache      Past Surgical History:   Procedure Laterality Date   • BREAST CYST ASPIRATION Right     20 yrs ago   • BREAST CYST INCISION AND DRAINAGE  12/1996, 1997   • CHOLECYSTECTOMY  03/2016   • COLONOSCOPY     • EAR TUBES     • TOTAL KNEE ARTHROPLASTY Left 4/22/2019    Procedure: left total knee arthroplasty, possible placement of ON-Q pump, and all associated procedures;  Surgeon: Dhaval Quinn MD;  Location: [facility];  Service: Orthopedics   • TUBAL ABDOMINAL LIGATION     • VARICOSE VEIN SURGERY  2015    Pt states she had varicose veins removed on both legs (2-3 years ago).     Social History     Occupational History   • Not on file   Tobacco Use   • Smoking status: Never Smoker   • Smokeless tobacco: Never Used   Substance and Sexual Activity   • Alcohol use: No   • Drug use: No   • Sexual activity: No     Partners: Male     Birth  control/protection: Post-menopausal     Comment:       Social History     Social History Narrative   • Not on file     Family History   Problem Relation Age of Onset   • Cancer Mother    • Colon cancer Mother 76   • Clotting disorder Other         does not know name   • Deep vein thrombosis Other    • Breast cancer Neg Hx    • Ovarian cancer Neg Hx    • Malig Hyperthermia Neg Hx          Physical Exam: 68 y.o. female  General Appearance:    Alert, cooperative, in no acute distress                      Vitals:    04/23/19 1500 04/23/19 1955 04/24/19 0015 04/24/19 0640   BP: 92/59 92/53 95/56 100/66   BP Location: Left arm Right arm Left arm Left arm   Patient Position: Lying Sitting Lying Lying   Pulse: 74 69 74 65   Resp: 16 16 18 18   Temp: 98.7 °F (37.1 °C) 97.5 °F (36.4 °C) 97.8 °F (36.6 °C) 98.5 °F (36.9 °C)   TempSrc: Oral Oral Oral Oral   SpO2: 96% 96% 94% 93%   Weight:       Height:            Head:    Normocephalic, without obvious abnormality, atraumatic   Eyes:            Lids and lashes normal, conjunctivae and sclerae normal, no   icterus, no pallor, corneas clear, PERRLA   Ears:    Ears appear intact with no abnormalities noted   Throat:   No oral lesions, no thrush, oral mucosa moist   Neck:   No adenopathy, supple, trachea midline, no thyromegaly, no    carotid bruit, no JVD   Back:     No kyphosis present, no scoliosis present, no skin lesions,       erythema or scars, no tenderness to percussion or                   palpation,   range of motion normal   Lungs:     Clear to auscultation,respirations regular, even and                   unlabored    Heart:    Regular rhythm and normal rate, normal S1 and S2, no            murmur, no gallop, no rub, no click   Chest Wall:    No abnormalities observed   Abdomen:     Normal bowel sounds, no masses, no organomegaly, soft        non-tender, non-distended, no guarding, no rebound                 tenderness   Rectal:     Deferred   Extremities:    Incision intact without signs or symptoms of infection.               Neurovascular status remains intact to operative extremity.      Moves all extremities well, no edema, no cyanosis, no              redness   Pulses:   Pulses palpable and equal bilaterally   Skin:   No bleeding, bruising or rash   Lymph nodes:   No palpable adenopathy   Neurologic:   Cranial nerves 2 - 12 grossly intact, sensation intact, DTR        present and equal bilaterally           Hospital Course:  68 y.o. female admitted to [facility] to services of Dhaval Quinn MD with Primary osteoarthritis of left knee [M17.12]  S/P total knee arthroplasty, left [Z96.652] on 4/22/2019 and underwent TOTAL KNEE ARTHROPLASTY AND ALL ASSOCIATED PROCEDURES  Per Dhaval Quinn MD. Antibiotic and VTE prophylaxis were per SCIP protocols. Post-operatively the patient transferred to the post-operative floor where the patient underwent mobilization therapy that included active as well as passive ROM exercises. Opioids were titrated to achieve appropriate pain management to allow for participation in mobilization exercises. Vital signs are now stable. The incision is intact without signs or symptoms of infection. Operative extremity neurovascular status remains intact.   Appropriate education re: incision care, activity levels, medications, and follow up visits was completed and all questions were answered. The patient is now deemed stable for discharge to Home.      DIAGNOSTIC TESTS:     Admission on 04/22/2019   Component Date Value Ref Range Status   • Glucose 04/23/2019 120* 65 - 99 mg/dL Final   • BUN 04/23/2019 12  8 - 23 mg/dL Final   • Creatinine 04/23/2019 0.58  0.57 - 1.00 mg/dL Final   • Sodium 04/23/2019 144  136 - 145 mmol/L Final   • Potassium 04/23/2019 4.2  3.5 - 5.2 mmol/L Final   • Chloride 04/23/2019 111* 98 - 107 mmol/L Final   • CO2 04/23/2019 25.6  22.0 - 29.0 mmol/L Final   • Calcium 04/23/2019 8.4* 8.6 - 10.5 mg/dL Final    • eGFR Non African Amer 04/23/2019 103  >60 mL/min/1.73 Final   • BUN/Creatinine Ratio 04/23/2019 20.7  7.0 - 25.0 Final   • Anion Gap 04/23/2019 7.4  mmol/L Final   • WBC 04/23/2019 13.99* 3.40 - 10.80 10*3/mm3 Final   • RBC 04/23/2019 3.70* 3.77 - 5.28 10*6/mm3 Final   • Hemoglobin 04/23/2019 11.1* 12.0 - 15.9 g/dL Final   • Hematocrit 04/23/2019 35.0  34.0 - 46.6 % Final   • MCV 04/23/2019 94.6  79.0 - 97.0 fL Final   • MCH 04/23/2019 30.0  26.6 - 33.0 pg Final   • MCHC 04/23/2019 31.7  31.5 - 35.7 g/dL Final   • RDW 04/23/2019 13.2  12.3 - 15.4 % Final   • RDW-SD 04/23/2019 45.9  37.0 - 54.0 fl Final   • MPV 04/23/2019 11.0  6.0 - 12.0 fL Final   • Platelets 04/23/2019 183  140 - 450 10*3/mm3 Final   • Neutrophil % 04/23/2019 78.5* 42.7 - 76.0 % Final   • Lymphocyte % 04/23/2019 12.7* 19.6 - 45.3 % Final   • Monocyte % 04/23/2019 8.1  5.0 - 12.0 % Final   • Eosinophil % 04/23/2019 0.1* 0.3 - 6.2 % Final   • Basophil % 04/23/2019 0.2  0.0 - 1.5 % Final   • Immature Grans % 04/23/2019 0.4  0.0 - 0.5 % Final   • Neutrophils, Absolute 04/23/2019 10.99* 1.70 - 7.00 10*3/mm3 Final   • Lymphocytes, Absolute 04/23/2019 1.78  0.70 - 3.10 10*3/mm3 Final   • Monocytes, Absolute 04/23/2019 1.13* 0.10 - 0.90 10*3/mm3 Final   • Eosinophils, Absolute 04/23/2019 0.01  0.00 - 0.40 10*3/mm3 Final   • Basophils, Absolute 04/23/2019 0.03  0.00 - 0.20 10*3/mm3 Final   • Immature Grans, Absolute 04/23/2019 0.05  0.00 - 0.05 10*3/mm3 Final   • nRBC 04/23/2019 0.0  0.0 - 0.2 /100 WBC Final       No results found.    Discharge and Follow up Instructions:   DVT Prophylaxis:  mg one tab BID x 2 weeks then one tablet daily x 2 weeks  Activity: ambulate with walker at this time, referral to PT initiated and appointment scheduled [facility], [place], [place]     Date: 4/24/2019    Imelda Hdez, APRN

## 2019-04-24 NOTE — PROGRESS NOTES
POD# 2 s/p left TKA    Subjective:Patient states she is doing well this am, pain well controlled with oral medication, denies fevers chills or sweats overnight, denies any residual numbness or tingling to operative extremity.  No calf pain or swelling.    Objective:  Vitals:    04/23/19 1500 04/23/19 1955 04/24/19 0015 04/24/19 0640   BP: 92/59 92/53 95/56 100/66   BP Location: Left arm Right arm Left arm Left arm   Patient Position: Lying Sitting Lying Lying   Pulse: 74 69 74 65   Resp: 16 16 18 18   Temp: 98.7 °F (37.1 °C) 97.5 °F (36.4 °C) 97.8 °F (36.6 °C) 98.5 °F (36.9 °C)   TempSrc: Oral Oral Oral Oral   SpO2: 96% 96% 94% 93%   Weight:       Height:           Results from last 7 days   Lab Units 04/23/19  0428   WBC 10*3/mm3 13.99*   HEMOGLOBIN g/dL 11.1*   HEMATOCRIT % 35.0   PLATELETS 10*3/mm3 183       Results from last 7 days   Lab Units 04/23/19  0428   SODIUM mmol/L 144   POTASSIUM mmol/L 4.2   CHLORIDE mmol/L 111*   CO2 mmol/L 25.6   BUN mg/dL 12   CREATININE mg/dL 0.58   GLUCOSE mg/dL 120*   CALCIUM mg/dL 8.4*       Exam:    Left knee- dressing clean, dry, intact   Flex and extend toes and ankle   No calf pain, negative Homans sign   Positive sensation light touch left foot   Brisk cap refill all digits, palpable dorsalis pedis pulse    Impression: s/p left TKA    Plan:  1. PT/OT- weight-bear as tolerated, ambulation, range of motion  2. Pain control- oxycodone, Tylenol, Lyrica  3. DVT prophylaxis- aspirin twice a day  4. Wound care- Prineo dermabond intact until follow-up in office  5. Disposition- home with outpatient PT once medically stable and cleared by PT today

## 2019-04-25 ENCOUNTER — TELEPHONE (OUTPATIENT)
Dept: ORTHOPEDIC SURGERY | Facility: CLINIC | Age: 69
End: 2019-04-25

## 2019-04-25 ENCOUNTER — READMISSION MANAGEMENT (OUTPATIENT)
Dept: CALL CENTER | Facility: HOSPITAL | Age: 69
End: 2019-04-25

## 2019-04-25 NOTE — NURSING NOTE
Case Management Discharge Note    Final Note: Home with outpatient PT    Destination      No service has been selected for the patient.      Durable Medical Equipment - Selection Complete      Service Provider Request Status Selected Services Address Phone Number Fax Number    [facility] [place] Selected Durable Medical Equipment [address] [address] [phone]      Dialysis/Infusion      No service has been selected for the patient.      Home Medical Care      No service has been selected for the patient.      Therapy      No service has been selected for the patient.      Community Resources      No service has been selected for the patient.             Final Discharge Disposition Code: 01 - home or self-care

## 2019-04-25 NOTE — OUTREACH NOTE
Prep Survey      Responses   Facility patient discharged from?  [facility]   Is LACE score < 7 ?  Yes   Is patient eligible?  Yes   Discharge diagnosis  Primary osteoarthritis of left knee, hypothyroidism, osteoporosis, vaginismus, vaginal atrophy, s/p left total knee arthroplasty   Does the patient have one of the following disease processes/diagnoses(primary or secondary)?  Other [Left total knee LACE <7]   Does the patient have Home health ordered?  No   Is there a DME ordered?  Yes   What DME was ordered?  Commode chair, knee scooter/walker per Marizol's   Comments regarding appointments  See AVS   Prep survey completed?  Yes          Gale Carlos RN

## 2019-04-26 ENCOUNTER — READMISSION MANAGEMENT (OUTPATIENT)
Dept: CALL CENTER | Facility: HOSPITAL | Age: 69
End: 2019-04-26

## 2019-04-26 NOTE — OUTREACH NOTE
LAG < 7 Survey      Responses   Facility patient discharged from?  [facility]   Does the patient have one of the following disease processes/diagnoses(primary or secondary)?  Other   Is there a successful TCM telephone encounter documented?  No   BHLAG <7 Attempt successful?  Yes   Call start time  0949   Call end time  0956   Meds reviewed with patient/caregiver?  Yes   Is the patient having any side effects they believe may be caused by any medication additions or changes?  No   Does the patient have all medications ordered at discharge?  Yes   Is the patient taking all medications as directed (includes completed medication regime)?  Yes   Does the patient have a primary care provider?   Yes   Does the patient have an appointment with their PCP within 7 days of discharge?  N/A   Has the patient kept scheduled appointments due by today?  Yes   Comments  Pt. starting outpt PT today   Psychosocial issues?  No   Did the patient receive a copy of their discharge instructions?  Yes   What is the patient's perception of their health status since discharge?  Improving   Is the patient/caregiver able to teach back signs and symptoms related to disease process for when to call PCP?  Yes   Is the patient/caregiver able to teach back signs and symptoms related to disease process for when to call 911?  Yes   Is the patient/caregiver able to teach back the hierarchy of who to call/visit for symptoms/problems? PCP, Specialist, Home health nurse, Urgent Care, ED, 911  Yes   Graduated  Yes          Leticia Way RN

## 2019-04-30 ENCOUNTER — TELEPHONE (OUTPATIENT)
Dept: ORTHOPEDIC SURGERY | Facility: CLINIC | Age: 69
End: 2019-04-30

## 2019-05-07 ENCOUNTER — OFFICE VISIT (OUTPATIENT)
Dept: ORTHOPEDIC SURGERY | Facility: CLINIC | Age: 69
End: 2019-05-07

## 2019-05-07 DIAGNOSIS — Z96.652 STATUS POST TOTAL LEFT KNEE REPLACEMENT: Primary | ICD-10-CM

## 2019-05-07 PROCEDURE — 99024 POSTOP FOLLOW-UP VISIT: CPT | Performed by: NURSE PRACTITIONER

## 2019-05-07 NOTE — PROGRESS NOTES
CC: s/p right and left total knee arthroplasty, DOS 04/22/2019    Interval History: Nikkie Kennedy returns for 2 week postoperative visit.  She is doing well. Pain is controlled with pain medication and is  improving. She denies any wound problem, fevers, or chills. Patient is continuing to work with outpatient PT. Ambulating with cane. Continuing DVT prophylaxis with use of  mg BID.     Physical Examination:    Left knee was examined   Incision clean and dry   ROM 2-114,  4/5 strength   Stable to varus and valgus stress   Flex/extend ankle and toes   Positive sensation left foot   No calf pain, negative Homans sign bilaterally    Assessment/Plan:  Nikkie Kennedy is recovering from surgery as expected.  We will continue outpatient therapy for range of motion, strengthening, and gait normalization.    She is to follow up in clinic in 4 weeks with xrays. Patient had all question answered today. Will continue DVT prophylaxis for an additional 2 weeks. Discussed with patient to avoid immersing incision for 4 weeks total after surgery. Patient verbalized understanding of all information and agrees with plan of care. Denies all other concerns present at this time.     Medications:  No orders of the defined types were placed in this encounter.      YAO Black

## 2019-06-06 ENCOUNTER — OFFICE VISIT (OUTPATIENT)
Dept: ORTHOPEDIC SURGERY | Facility: CLINIC | Age: 69
End: 2019-06-06

## 2019-06-06 DIAGNOSIS — Z96.652 STATUS POST TOTAL LEFT KNEE REPLACEMENT: ICD-10-CM

## 2019-06-06 DIAGNOSIS — R52 PAIN: Primary | ICD-10-CM

## 2019-06-06 PROCEDURE — 99024 POSTOP FOLLOW-UP VISIT: CPT | Performed by: ORTHOPAEDIC SURGERY

## 2019-06-06 PROCEDURE — 73562 X-RAY EXAM OF KNEE 3: CPT | Performed by: ORTHOPAEDIC SURGERY

## 2019-06-06 RX ORDER — MELOXICAM 7.5 MG/1
7.5 TABLET ORAL DAILY
Qty: 30 TABLET | Refills: 0 | Status: SHIPPED | OUTPATIENT
Start: 2019-06-06 | End: 2019-07-02 | Stop reason: SDUPTHER

## 2019-06-06 NOTE — PROGRESS NOTES
CC: s/p left total knee arthroplasty, DOS 4/22/2019  Interval History: Nikkie Kennedy returns for 6 week postoperative visit. She is doing well. Pain is controlled with pain medication and is improving. She denies any wound problem, fevers, or chills. Patient is continuing to work with PT. Ambulating without cane.     Physical Examination: Left knee was examined   Incision well healed   ROM 0-120,  4/5 strength   Stable to varus and valgus stress   Flex/extend ankle and toes   Positive sensation left foot   No calf pain, negative Homans sign bilaterally    Radiographic review: Radiographs of the left  knee 3 views, AP, lateral and patella axial views, compared to immediate postop films, indication s/p TKA,  shows that the implant is in good position. There is no evidence of implant loosening or osteolysis, no dislocation or periprosthetic fractures. Overall alignment acceptable at this time.     Assessment/Plan:  Nikkie Kennedy is recovering from surgery as expected.  We will continue outpatient therapy for range of motion, strengthening, and gait normalization. .  She is to follow up in clinic in 6 weeks with no xrays. Patient had all question answered today. Discussed need for prophylactic antibiotics with dental/endoscopy procedures.     Medications:  New Medications Ordered This Visit   Medications   • meloxicam (MOBIC) 7.5 MG tablet     Sig: Take 1 tablet by mouth Daily.     Dispense:  30 tablet     Refill:  0       Dhaval Quinn MD

## 2019-07-02 RX ORDER — MELOXICAM 7.5 MG/1
TABLET ORAL
Qty: 30 TABLET | Refills: 0 | Status: SHIPPED | OUTPATIENT
Start: 2019-07-02 | End: 2019-08-04 | Stop reason: SDUPTHER

## 2019-07-18 ENCOUNTER — OFFICE VISIT (OUTPATIENT)
Dept: ORTHOPEDIC SURGERY | Facility: CLINIC | Age: 69
End: 2019-07-18

## 2019-07-18 DIAGNOSIS — Z96.652 STATUS POST TOTAL LEFT KNEE REPLACEMENT: Primary | ICD-10-CM

## 2019-07-18 PROCEDURE — 99024 POSTOP FOLLOW-UP VISIT: CPT | Performed by: ORTHOPAEDIC SURGERY

## 2019-07-18 NOTE — PROGRESS NOTES
CC: s/p left total knee arthroplasty, DOS 4/22/2019    Interval History: Nikkie Kennedy returns for 12 week postoperative visit. She is doing well. She denies any significant residual pain. She does experience some tightness and numbness on the anterior aspect of her knee. Patient is finished with her physical therapy. Ambulating without cane.      Physical Examination: Left knee was examined              Incision well healed              ROM 0-130    4+/5 strength on flexion and extension   No effusion              Stable to varus and valgus stress              Flex/extend ankle and toes              Positive sensation left foot              No calf pain, negative Homans sign bilaterally     Assessment/Plan:  1. Nikkie Kennedy is recovering from surgery as expected.  2. Discussed using a cushion to relieve pressure from the left knee if she is to kneel on the floor.  3. Patient encouraged to use the rowing machine at the gym for added strengthening and stretching.  4. Patient may continue to use ice as needed.  5. She may now swim in the pool or ocean.   6. Follow-up in 3 months with repeat x-ray of the left knee.     By signing my name here, I Bonny Leyva, attest that all documentation on 07/18/19 at 9:21 AM has been prepared under the direction and in the presence of Dr. Dhaval Quinn.    I, Dr. Dhaval Quinn, personally performed the services described in this documentation, as scribed by Bonny Leyva, in my presence, and it is both accurate and complete.

## 2019-08-05 RX ORDER — MELOXICAM 7.5 MG/1
TABLET ORAL
Qty: 30 TABLET | Refills: 0 | Status: SHIPPED | OUTPATIENT
Start: 2019-08-05 | End: 2019-09-25 | Stop reason: SDUPTHER

## 2019-08-09 ENCOUNTER — TELEPHONE (OUTPATIENT)
Dept: ORTHOPEDIC SURGERY | Facility: CLINIC | Age: 69
End: 2019-08-09

## 2019-08-09 RX ORDER — CLINDAMYCIN HYDROCHLORIDE 300 MG/1
CAPSULE ORAL
Qty: 2 CAPSULE | Refills: 3 | OUTPATIENT
Start: 2019-08-09 | End: 2019-12-23

## 2019-09-25 RX ORDER — MELOXICAM 7.5 MG/1
TABLET ORAL
Qty: 30 TABLET | Refills: 0 | Status: SHIPPED | OUTPATIENT
Start: 2019-09-25 | End: 2019-10-23 | Stop reason: SDUPTHER

## 2019-10-18 ENCOUNTER — OFFICE VISIT (OUTPATIENT)
Dept: ORTHOPEDIC SURGERY | Facility: CLINIC | Age: 69
End: 2019-10-18

## 2019-10-18 DIAGNOSIS — Z96.652 STATUS POST TOTAL LEFT KNEE REPLACEMENT: Primary | ICD-10-CM

## 2019-10-18 PROCEDURE — 99212 OFFICE O/P EST SF 10 MIN: CPT | Performed by: ORTHOPAEDIC SURGERY

## 2019-10-18 PROCEDURE — 73562 X-RAY EXAM OF KNEE 3: CPT | Performed by: ORTHOPAEDIC SURGERY

## 2019-10-18 NOTE — PROGRESS NOTES
Subjective:     Patient ID: Nikkie Kennedy is a 69 y.o. female.    Chief Complaint: s/p left total knee arthroplasty, DOS 4/22/2019    History of Present Illness  Nikkie Kennedy returns to clinic today for evaluation of her left knee. She states she is doing well. She does report a little bit of tightness in the left knee. She denies any residual pain at this time.      Social History     Occupational History   • Not on file   Tobacco Use   • Smoking status: Never Smoker   • Smokeless tobacco: Never Used   Substance and Sexual Activity   • Alcohol use: No   • Drug use: No   • Sexual activity: No     Partners: Male     Birth control/protection: Post-menopausal     Comment:       Past Medical History:   Diagnosis Date   • Abnormal vaginal Pap smear 09/10/1999    ascus   • Abnormal vaginal Pap smear 11/06/2000    lgsil   • Arthritis    • Asthma     h/o, no inhaler   • Disease of thyroid gland     hypothyroid   • DJD (degenerative joint disease) of knee     sched TKA left   • History of colposcopy 10/11/1999    chronic endocervicitis   • History of UTI    • Hole in the ear drum, right     d/t tube   • Osteoporosis    • Spinal headache      Past Surgical History:   Procedure Laterality Date   • BREAST CYST ASPIRATION Right     20 yrs ago   • BREAST CYST INCISION AND DRAINAGE  12/1996, 1997   • CHOLECYSTECTOMY  03/2016   • COLONOSCOPY     • EAR TUBES     • TOTAL KNEE ARTHROPLASTY Left 4/22/2019    Procedure: left total knee arthroplasty, possible placement of ON-Q pump, and all associated procedures;  Surgeon: Dhaval Quinn MD;  Location: [facility];  Service: Orthopedics   • TUBAL ABDOMINAL LIGATION     • VARICOSE VEIN SURGERY  2015    Pt states she had varicose veins removed on both legs (2-3 years ago).       Family History   Problem Relation Age of Onset   • Cancer Mother    • Colon cancer Mother 76   • Clotting disorder Other         does not know name   • Deep vein thrombosis Other    • Breast cancer Neg  Hx    • Ovarian cancer Neg Hx    • Malig Hyperthermia Neg Hx          Review of Systems   Constitutional: Negative for chills, diaphoresis, fever and unexpected weight change.   HENT: Negative for hearing loss, nosebleeds, sore throat and tinnitus.    Eyes: Negative for pain and visual disturbance.   Respiratory: Negative for cough, shortness of breath and wheezing.    Cardiovascular: Negative for chest pain and palpitations.   Gastrointestinal: Negative for abdominal pain, diarrhea, nausea and vomiting.   Endocrine: Negative for cold intolerance, heat intolerance and polydipsia.   Genitourinary: Negative for difficulty urinating, dysuria and hematuria.   Musculoskeletal: Negative for arthralgias, joint swelling and myalgias.   Skin: Negative for rash and wound.   Allergic/Immunologic: Negative for environmental allergies.   Neurological: Negative for dizziness, syncope and numbness.   Hematological: Does not bruise/bleed easily.   Psychiatric/Behavioral: Negative for dysphoric mood and sleep disturbance. The patient is not nervous/anxious.            Objective:  There were no vitals filed for this visit.  There were no vitals filed for this visit.  There is no height or weight on file to calculate BMI.   General: No acute distress.  Resp: normal respiratory effort  Skin: no rashes or wounds; normal turgor  Psych: mood and affect appropriate; recent and remote memory intact      Ortho Exam       Left Knee-    ROM 0-125 degrees  4+/5 on flexion  4+/5 on extension  No opening on varus and valgus stress at 0 and 30  Log roll-  negative  Stinchfield-  negative  Positive sensation light tough all distributions symmetric to contralateral side  Brisk cap refill all digits  2+ dorsalis pedis pulse    Imaging:    Left Knee X-Ray  Indication: s/p total knee     AP, Lateral, and [place] views    Findings:  Total knee arthroplasty components are in stable position with acceptable overall alignment, no evidence of periprosthetic  fracture, loosening, ostial lysis, or reactive bone formation.    Compared to prior postoperative x-rays    Assessment:        1. Status post total left knee replacement           Plan:          1. Discussed treatment options at length with patient at today's visit.   2. Follow-up in 6 months with repeat x-ray of the left knee as well as KOOS scores      Nikkie Kennedy was in agreement with plan and had all questions answered.     Orders:  Orders Placed This Encounter   Procedures   • XR Knee 3+ View With Windmill Left       Medications:  No orders of the defined types were placed in this encounter.      Followup:  No Follow-up on file.    Nikkie was seen today for follow-up.    Diagnoses and all orders for this visit:    Status post total left knee replacement  -     XR Knee 3+ View With Windmill Left        By signing my name here, I Bonny Leyva, attest that all documentation on 10/18/19 at 10:27 AM has been prepared under the direction and in the presence of Dr. Dhaval Quinn.    I, Dr. Dhaval Quinn, personally performed the services described in this documentation, as scribed by Bonny Leyva, in my presence, and it is both accurate and complete.    Dictated utilizing Dragon dictation

## 2019-10-23 RX ORDER — MELOXICAM 7.5 MG/1
TABLET ORAL
Qty: 30 TABLET | Refills: 0 | Status: SHIPPED | OUTPATIENT
Start: 2019-10-23 | End: 2020-06-04 | Stop reason: ALTCHOICE

## 2019-10-28 RX ORDER — MELOXICAM 7.5 MG/1
TABLET ORAL
Qty: 30 TABLET | Refills: 0 | Status: SHIPPED | OUTPATIENT
Start: 2019-10-28 | End: 2019-12-30

## 2019-12-11 ENCOUNTER — TRANSCRIBE ORDERS (OUTPATIENT)
Dept: ADMINISTRATIVE | Facility: HOSPITAL | Age: 69
End: 2019-12-11

## 2019-12-11 DIAGNOSIS — Z12.31 VISIT FOR SCREENING MAMMOGRAM: Primary | ICD-10-CM

## 2019-12-23 ENCOUNTER — APPOINTMENT (OUTPATIENT)
Dept: CT IMAGING | Facility: HOSPITAL | Age: 69
End: 2019-12-23

## 2019-12-23 ENCOUNTER — APPOINTMENT (OUTPATIENT)
Dept: GENERAL RADIOLOGY | Facility: HOSPITAL | Age: 69
End: 2019-12-23

## 2019-12-23 ENCOUNTER — HOSPITAL ENCOUNTER (EMERGENCY)
Facility: HOSPITAL | Age: 69
Discharge: HOME | End: 2019-12-23
Attending: EMERGENCY MEDICINE | Admitting: EMERGENCY MEDICINE

## 2019-12-23 VITALS
TEMPERATURE: 98 F | WEIGHT: 120 LBS | DIASTOLIC BLOOD PRESSURE: 68 MMHG | OXYGEN SATURATION: 97 % | RESPIRATION RATE: 16 BRPM | BODY MASS INDEX: 22.08 KG/M2 | HEIGHT: 62 IN | HEART RATE: 67 BPM | SYSTOLIC BLOOD PRESSURE: 123 MMHG

## 2019-12-23 DIAGNOSIS — R07.9 CHEST PAIN, UNSPECIFIED TYPE: Primary | ICD-10-CM

## 2019-12-23 LAB
ALBUMIN SERPL-MCNC: 4.2 G/DL (ref 3.5–5.2)
ALBUMIN/GLOB SERPL: 1.4 G/DL
ALP SERPL-CCNC: 105 U/L (ref 39–117)
ALT SERPL W P-5'-P-CCNC: 18 U/L (ref 1–33)
ANION GAP SERPL CALCULATED.3IONS-SCNC: 11.8 MMOL/L (ref 5–15)
AST SERPL-CCNC: 22 U/L (ref 1–32)
BASOPHILS # BLD AUTO: 0.04 10*3/MM3 (ref 0–0.2)
BASOPHILS NFR BLD AUTO: 0.5 % (ref 0–1.5)
BILIRUB SERPL-MCNC: 0.3 MG/DL (ref 0.2–1.2)
BUN BLD-MCNC: 22 MG/DL (ref 8–23)
BUN/CREAT SERPL: 26.2 (ref 7–25)
CALCIUM SPEC-SCNC: 10.1 MG/DL (ref 8.6–10.5)
CHLORIDE SERPL-SCNC: 108 MMOL/L (ref 98–107)
CO2 SERPL-SCNC: 25.2 MMOL/L (ref 22–29)
CREAT BLD-MCNC: 0.84 MG/DL (ref 0.57–1)
D DIMER PPP FEU-MCNC: 0.51 MCGFEU/ML (ref 0–0.46)
DEPRECATED RDW RBC AUTO: 45.5 FL (ref 37–54)
EOSINOPHIL # BLD AUTO: 0.15 10*3/MM3 (ref 0–0.4)
EOSINOPHIL NFR BLD AUTO: 1.8 % (ref 0.3–6.2)
ERYTHROCYTE [DISTWIDTH] IN BLOOD BY AUTOMATED COUNT: 13.4 % (ref 12.3–15.4)
GFR SERPL CREATININE-BSD FRML MDRD: 67 ML/MIN/1.73
GLOBULIN UR ELPH-MCNC: 2.9 GM/DL
GLUCOSE BLD-MCNC: 89 MG/DL (ref 65–99)
HCT VFR BLD AUTO: 39.9 % (ref 34–46.6)
HGB BLD-MCNC: 12.5 G/DL (ref 12–15.9)
IMM GRANULOCYTES # BLD AUTO: 0.04 10*3/MM3 (ref 0–0.05)
IMM GRANULOCYTES NFR BLD AUTO: 0.5 % (ref 0–0.5)
LYMPHOCYTES # BLD AUTO: 2.65 10*3/MM3 (ref 0.7–3.1)
LYMPHOCYTES NFR BLD AUTO: 32 % (ref 19.6–45.3)
MCH RBC QN AUTO: 29.2 PG (ref 26.6–33)
MCHC RBC AUTO-ENTMCNC: 31.3 G/DL (ref 31.5–35.7)
MCV RBC AUTO: 93.2 FL (ref 79–97)
MONOCYTES # BLD AUTO: 0.77 10*3/MM3 (ref 0.1–0.9)
MONOCYTES NFR BLD AUTO: 9.3 % (ref 5–12)
NEUTROPHILS # BLD AUTO: 4.63 10*3/MM3 (ref 1.7–7)
NEUTROPHILS NFR BLD AUTO: 55.9 % (ref 42.7–76)
NRBC BLD AUTO-RTO: 0 /100 WBC (ref 0–0.2)
PLATELET # BLD AUTO: 254 10*3/MM3 (ref 140–450)
PMV BLD AUTO: 10.3 FL (ref 6–12)
POTASSIUM BLD-SCNC: 4 MMOL/L (ref 3.5–5.2)
PROT SERPL-MCNC: 7.1 G/DL (ref 6–8.5)
RBC # BLD AUTO: 4.28 10*6/MM3 (ref 3.77–5.28)
SODIUM BLD-SCNC: 145 MMOL/L (ref 136–145)
TROPONIN T SERPL-MCNC: <0.01 NG/ML (ref 0–0.03)
TROPONIN T SERPL-MCNC: <0.01 NG/ML (ref 0–0.03)
WBC NRBC COR # BLD: 8.28 10*3/MM3 (ref 3.4–10.8)

## 2019-12-23 PROCEDURE — 0 IOPAMIDOL PER 1 ML: Performed by: EMERGENCY MEDICINE

## 2019-12-23 PROCEDURE — 71046 X-RAY EXAM CHEST 2 VIEWS: CPT

## 2019-12-23 PROCEDURE — 93010 ELECTROCARDIOGRAM REPORT: CPT | Performed by: INTERNAL MEDICINE

## 2019-12-23 PROCEDURE — 71275 CT ANGIOGRAPHY CHEST: CPT

## 2019-12-23 PROCEDURE — 93005 ELECTROCARDIOGRAM TRACING: CPT | Performed by: EMERGENCY MEDICINE

## 2019-12-23 PROCEDURE — 99284 EMERGENCY DEPT VISIT MOD MDM: CPT

## 2019-12-23 PROCEDURE — 85025 COMPLETE CBC W/AUTO DIFF WBC: CPT | Performed by: EMERGENCY MEDICINE

## 2019-12-23 PROCEDURE — 84484 ASSAY OF TROPONIN QUANT: CPT | Performed by: EMERGENCY MEDICINE

## 2019-12-23 PROCEDURE — 85379 FIBRIN DEGRADATION QUANT: CPT | Performed by: EMERGENCY MEDICINE

## 2019-12-23 PROCEDURE — 99284 EMERGENCY DEPT VISIT MOD MDM: CPT | Performed by: EMERGENCY MEDICINE

## 2019-12-23 PROCEDURE — 80053 COMPREHEN METABOLIC PANEL: CPT | Performed by: EMERGENCY MEDICINE

## 2019-12-23 RX ORDER — ACETAMINOPHEN 500 MG
1000 TABLET ORAL ONCE
Status: COMPLETED | OUTPATIENT
Start: 2019-12-23 | End: 2019-12-23

## 2019-12-23 RX ORDER — SODIUM CHLORIDE 0.9 % (FLUSH) 0.9 %
10 SYRINGE (ML) INJECTION AS NEEDED
Status: DISCONTINUED | OUTPATIENT
Start: 2019-12-23 | End: 2019-12-23 | Stop reason: HOSPADM

## 2019-12-23 RX ADMIN — ACETAMINOPHEN 1000 MG: 500 TABLET, FILM COATED ORAL at 07:29

## 2019-12-23 RX ADMIN — IOPAMIDOL 100 ML: 755 INJECTION, SOLUTION INTRAVENOUS at 08:16

## 2019-12-23 NOTE — ED PROVIDER NOTES
EMERGENCY DEPARTMENT ENCOUNTER    Room Number:  5/05  Date of encounter:  12/23/2019  PCP: Jim Michael MD  Historian: Patient,       HPI:  Chief Complaint: Chest pain  A complete HPI/ROS/PMH/PSH/SH/FH are unobtainable due to: None    Context: Nikkie Kennedy is a 69 y.o. female who presents to the ED c/o chest pain.  Chest pain began yesterday afternoon and was initially intermittent.  Pain became more constant around 430 this morning.  Pain described as sharp.  Mild at rest and worsened with movements or deep breath.  Pain is located in the right breast and does not radiate.  Has not had prior pain in the past.  Patient did take Tylenol extra strength last night but is not taken anything this morning for the pain.  Patient denies any history of coronary artery disease and states she had a negative stress test in the past but is not sure what year.  Patient denies history of DVT and has no significant risk factor for DVT including recent surgery, travel, malignancy or other significant DVT risk factors.      PAST MEDICAL HISTORY  Active Ambulatory Problems     Diagnosis Date Noted   • Primary osteoarthritis of left knee 03/09/2018   • Hypothyroidism 04/19/2018   • Osteoporosis 04/19/2018   • Vaginismus 04/19/2018   • Vaginal atrophy 04/19/2018   • Status post total left knee replacement 04/22/2019     Resolved Ambulatory Problems     Diagnosis Date Noted   • No Resolved Ambulatory Problems     Past Medical History:   Diagnosis Date   • Abnormal vaginal Pap smear 09/10/1999   • Abnormal vaginal Pap smear 11/06/2000   • Arthritis    • Asthma    • Disease of thyroid gland    • DJD (degenerative joint disease) of knee    • History of colposcopy 10/11/1999   • History of UTI    • Hole in the ear drum, right    • Spinal headache          PAST SURGICAL HISTORY  Past Surgical History:   Procedure Laterality Date   • BREAST CYST ASPIRATION Right     20 yrs ago   • BREAST CYST INCISION AND DRAINAGE  12/1996, 1997    • CHOLECYSTECTOMY  03/2016   • COLONOSCOPY     • EAR TUBES     • TOTAL KNEE ARTHROPLASTY Left 4/22/2019    Procedure: left total knee arthroplasty, possible placement of ON-Q pump, and all associated procedures;  Surgeon: Dhaval Quinn MD;  Location: [facility];  Service: Orthopedics   • TUBAL ABDOMINAL LIGATION     • VARICOSE VEIN SURGERY  2015    Pt states she had varicose veins removed on both legs (2-3 years ago).         FAMILY HISTORY  Family History   Problem Relation Age of Onset   • Cancer Mother    • Colon cancer Mother 76   • Clotting disorder Other         does not know name   • Deep vein thrombosis Other    • Breast cancer Neg Hx    • Ovarian cancer Neg Hx    • Malig Hyperthermia Neg Hx          SOCIAL HISTORY  Social History     Socioeconomic History   • Marital status:      Spouse name: Not on file   • Number of children: Not on file   • Years of education: Not on file   • Highest education level: Not on file   Tobacco Use   • Smoking status: Never Smoker   • Smokeless tobacco: Never Used   Substance and Sexual Activity   • Alcohol use: No   • Drug use: No   • Sexual activity: Never     Partners: Male     Birth control/protection: Post-menopausal     Comment:          ALLERGIES  Hydrocodone; Codeine; Omnicef [cefdinir]; and Oxycodone       REVIEW OF SYSTEMS  Review of Systems   Constitutional: Negative.  Negative for fever.   HENT: Negative.  Negative for sore throat.    Eyes: Negative.    Respiratory: Negative.  Negative for cough.    Cardiovascular: Positive for chest pain.   Gastrointestinal: Negative.    Genitourinary: Negative.  Negative for dysuria.   Musculoskeletal: Positive for arthralgias (Chronic). Negative for back pain.   Skin: Negative.  Negative for rash.   Neurological: Negative.  Negative for headaches.   All other systems reviewed and are negative.          PHYSICAL EXAM    I have reviewed the triage vital signs and nursing notes.    ED Triage Vitals [12/23/19  0651]   Temp Heart Rate Resp BP SpO2   98 °F (36.7 °C) 83 16 (!) 144/104 99 %      Temp src Heart Rate Source Patient Position BP Location FiO2 (%)   -- -- -- -- --       Physical Exam  GENERAL: not distressed, well-appearing  HENT: nares patent  EYES: no scleral icterus  CV: regular rhythm, regular rate, no murmur  RESPIRATORY: normal effort, unlabored.  Saturations 99% on room air  ABDOMEN: soft, nontender  MUSCULOSKELETAL: Reproducible tenderness to palpation just below the right breast.  There is no lower extremity swelling or tenderness to palpation  NEURO: Strength, sensation, and coordination are grossly intact.  Speech and mentation are unremarkable  SKIN: warm, dry      LAB RESULTS  Recent Results (from the past 24 hour(s))   Troponin    Collection Time: 12/23/19  6:55 AM   Result Value Ref Range    Troponin T <0.010 0.000-<0.030 ng/mL   CBC Auto Differential    Collection Time: 12/23/19  6:55 AM   Result Value Ref Range    WBC 8.28 3.40 - 10.80 10*3/mm3    RBC 4.28 3.77 - 5.28 10*6/mm3    Hemoglobin 12.5 12.0 - 15.9 g/dL    Hematocrit 39.9 34.0 - 46.6 %    MCV 93.2 79.0 - 97.0 fL    MCH 29.2 26.6 - 33.0 pg    MCHC 31.3 (L) 31.5 - 35.7 g/dL    RDW 13.4 12.3 - 15.4 %    RDW-SD 45.5 37.0 - 54.0 fl    MPV 10.3 6.0 - 12.0 fL    Platelets 254 140 - 450 10*3/mm3    Neutrophil % 55.9 42.7 - 76.0 %    Lymphocyte % 32.0 19.6 - 45.3 %    Monocyte % 9.3 5.0 - 12.0 %    Eosinophil % 1.8 0.3 - 6.2 %    Basophil % 0.5 0.0 - 1.5 %    Immature Grans % 0.5 0.0 - 0.5 %    Neutrophils, Absolute 4.63 1.70 - 7.00 10*3/mm3    Lymphocytes, Absolute 2.65 0.70 - 3.10 10*3/mm3    Monocytes, Absolute 0.77 0.10 - 0.90 10*3/mm3    Eosinophils, Absolute 0.15 0.00 - 0.40 10*3/mm3    Basophils, Absolute 0.04 0.00 - 0.20 10*3/mm3    Immature Grans, Absolute 0.04 0.00 - 0.05 10*3/mm3    nRBC 0.0 0.0 - 0.2 /100 WBC   D-dimer, Quantitative    Collection Time: 12/23/19  6:55 AM   Result Value Ref Range    D-Dimer, Quantitative 0.51 (H) 0.00 -  0.46 MCGFEU/mL       Ordered the above labs and independently reviewed the results.      RADIOLOGY  No Radiology Exams Resulted Within Past 24 Hours    I ordered the above noted radiological studies. Reviewed by me and discussed with radiologist.  See dictation for official radiology interpretation.      PROCEDURES  Procedures      MEDICATIONS GIVEN IN ER    Medications   sodium chloride 0.9 % flush 10 mL (has no administration in time range)   acetaminophen (TYLENOL) tablet 1,000 mg (1,000 mg Oral Given 12/23/19 0729)         PROGRESS, DATA ANALYSIS, CONSULTS, AND MEDICAL DECISION MAKING    All labs have been independently reviewed by me.  All radiology studies have been reviewed by me and discussed with radiologist dictating the report.   EKG's independently viewed and interpreted by me.  Discussion below represents my analysis of pertinent findings related to patient's condition, differential diagnosis, treatment plan and final disposition.      ED Course as of Dec 23 0735   Mon Dec 23, 2019   0710 EKG          EKG time: 0652  Rhythm/Rate: Sinus 79 with occasional PVC  P waves and MS: Normal P waves and MS intervals  QRS, axis: Left axis deviation, LAFB  ST and T waves: Unremarkable    Interpreted Contemporaneously by me, independently viewed  Not significantly changed compared to prior 4/2019      [DB]   0712 I have reviewed patient's medical records through the epic system.  I see no prior cardiac stress testing in our documentation although patient does states she has a stress test in the past that was unremarkable.  Patient had a EKG earlier this year in preparation for knee replacement surgery.  There is no significant change on EKG.    [DB]   0713 Initial impression-symptoms are atypical for acute coronary syndrome.  She has a sharp pain that is worsened by movement and reproducible to palpation over the right breast.  Her ED heart score is 2 pending negative troponin.  My plan would be to assess for  serious causes to include myocardial infarction, dissection and pulmonary embolism using chest x-ray, d-dimer and troponin x2.  I feel that given her low pretest probability for acute coronary syndrome that she could be safely discharged with 2- troponins and negative d-dimer and normal chest x-ray.  As my shift is ending near 730 I would plan to turn over care of this patient to my oncoming partner, Dr. Goncalves pending repeat troponin with plan for likely discharge assuming testing is reassuring.    [DB]   0765 D-dimer is found to be 0.51 which is just above the lab threshold for normal but felt to be negative using age-related criteria.  Given that patient does have pleuritic pain, I would get a CTA of the chest to be on the safe side to rule out pulmonary embolism and aortic dissection.  I feel the pretest probability of both of these is low and would feel comfortable calling them ruled out if the CTA is negative.    [DB]      ED Course User Index  [DB] Virgilio Kim MD       AS OF 7:35 AM VITALS:    BP - 139/65  HR - 74  TEMP - 98 °F (36.7 °C)  O2 SATS - 99%      DIAGNOSIS  Final diagnoses:   Chest pain, unspecified type         DISPOSITION  Turned over to Dr. Goncalves pending further work-up including repeat troponin.  Plan for likely discharge if work-up is benign.         Virgilio Kim MD  12/23/19 5861

## 2019-12-23 NOTE — ED PROVIDER NOTES
I assumed care of this patient from Dr. Kim at shift change this morning.  CT scan results were pending at that time.    All tests have now been resulted and reviewed.    Results for orders placed or performed during the hospital encounter of 12/23/19   Troponin   Result Value Ref Range    Troponin T <0.010 0.000-<0.030 ng/mL   CBC Auto Differential   Result Value Ref Range    WBC 8.28 3.40 - 10.80 10*3/mm3    RBC 4.28 3.77 - 5.28 10*6/mm3    Hemoglobin 12.5 12.0 - 15.9 g/dL    Hematocrit 39.9 34.0 - 46.6 %    MCV 93.2 79.0 - 97.0 fL    MCH 29.2 26.6 - 33.0 pg    MCHC 31.3 (L) 31.5 - 35.7 g/dL    RDW 13.4 12.3 - 15.4 %    RDW-SD 45.5 37.0 - 54.0 fl    MPV 10.3 6.0 - 12.0 fL    Platelets 254 140 - 450 10*3/mm3    Neutrophil % 55.9 42.7 - 76.0 %    Lymphocyte % 32.0 19.6 - 45.3 %    Monocyte % 9.3 5.0 - 12.0 %    Eosinophil % 1.8 0.3 - 6.2 %    Basophil % 0.5 0.0 - 1.5 %    Immature Grans % 0.5 0.0 - 0.5 %    Neutrophils, Absolute 4.63 1.70 - 7.00 10*3/mm3    Lymphocytes, Absolute 2.65 0.70 - 3.10 10*3/mm3    Monocytes, Absolute 0.77 0.10 - 0.90 10*3/mm3    Eosinophils, Absolute 0.15 0.00 - 0.40 10*3/mm3    Basophils, Absolute 0.04 0.00 - 0.20 10*3/mm3    Immature Grans, Absolute 0.04 0.00 - 0.05 10*3/mm3    nRBC 0.0 0.0 - 0.2 /100 WBC   Comprehensive Metabolic Panel   Result Value Ref Range    Glucose 89 65 - 99 mg/dL    BUN 22 8 - 23 mg/dL    Creatinine 0.84 0.57 - 1.00 mg/dL    Sodium 145 136 - 145 mmol/L    Potassium 4.0 3.5 - 5.2 mmol/L    Chloride 108 (H) 98 - 107 mmol/L    CO2 25.2 22.0 - 29.0 mmol/L    Calcium 10.1 8.6 - 10.5 mg/dL    Total Protein 7.1 6.0 - 8.5 g/dL    Albumin 4.20 3.50 - 5.20 g/dL    ALT (SGPT) 18 1 - 33 U/L    AST (SGOT) 22 1 - 32 U/L    Alkaline Phosphatase 105 39 - 117 U/L    Total Bilirubin 0.3 0.2 - 1.2 mg/dL    eGFR Non African Amer 67 >60 mL/min/1.73    Globulin 2.9 gm/dL    A/G Ratio 1.4 g/dL    BUN/Creatinine Ratio 26.2 (H) 7.0 - 25.0    Anion Gap 11.8 5.0 - 15.0 mmol/L  "  D-dimer, Quantitative   Result Value Ref Range    D-Dimer, Quantitative 0.51 (H) 0.00 - 0.46 MCGFEU/mL   Troponin   Result Value Ref Range    Troponin T <0.010 0.000-<0.030 ng/mL       RADIOLOGY        Study: CTA chest    Findings: No aortic aneurysm or dissection.<BR> 2. No PE.<BR> 3. Old healed granulomatous disease.    Interpreted contemporaneously with treatment by Dr. White, independently viewed by me        I reviewed all these test results with the patient at the bedside.  She continues to look well clinically.  She has no sign of respiratory problems and seems to have no sign of pain either.  Her vital signs are quite normal and reassuring.  Given her low risk profile, I think she is perfectly fine for discharge home at this time to follow-up with her primary care doctor for repeat evaluation and outpatient testing.  I did review with her the usual \"return to ER\" instructions for any worsening signs or symptoms prior to her departure.      Diagnosis:  Chest pain             Edmar Goncalves MD  12/23/19 0959    "

## 2019-12-23 NOTE — DISCHARGE INSTRUCTIONS
Follow-up with your primary care doctor for repeat evaluation.  Please return to the emergency room for any worsening pain, fevers, cough, weakness, dizziness, difficulties breathing or any other concerns.

## 2019-12-30 RX ORDER — MELOXICAM 7.5 MG/1
TABLET ORAL
Qty: 30 TABLET | Refills: 0 | Status: SHIPPED | OUTPATIENT
Start: 2019-12-30 | End: 2020-03-11

## 2020-01-06 ENCOUNTER — HOSPITAL ENCOUNTER (OUTPATIENT)
Dept: MAMMOGRAPHY | Facility: HOSPITAL | Age: 70
Discharge: HOME | End: 2020-01-06
Admitting: OBSTETRICS & GYNECOLOGY

## 2020-01-06 DIAGNOSIS — Z12.31 VISIT FOR SCREENING MAMMOGRAM: ICD-10-CM

## 2020-01-06 PROCEDURE — 77063 BREAST TOMOSYNTHESIS BI: CPT

## 2020-01-06 PROCEDURE — 77067 SCR MAMMO BI INCL CAD: CPT

## 2020-02-26 ENCOUNTER — TELEPHONE (OUTPATIENT)
Dept: ORTHOPEDIC SURGERY | Facility: CLINIC | Age: 70
End: 2020-02-26

## 2020-02-26 RX ORDER — CLINDAMYCIN HYDROCHLORIDE 300 MG/1
CAPSULE ORAL
Qty: 2 CAPSULE | Refills: 3 | Status: SHIPPED | OUTPATIENT
Start: 2020-02-26

## 2020-03-11 RX ORDER — MELOXICAM 7.5 MG/1
TABLET ORAL
Qty: 30 TABLET | Refills: 0 | Status: SHIPPED | OUTPATIENT
Start: 2020-03-11 | End: 2020-04-03

## 2020-04-03 RX ORDER — MELOXICAM 7.5 MG/1
TABLET ORAL
Qty: 30 TABLET | Refills: 0 | Status: SHIPPED | OUTPATIENT
Start: 2020-04-03 | End: 2020-04-30

## 2020-04-30 RX ORDER — MELOXICAM 7.5 MG/1
TABLET ORAL
Qty: 30 TABLET | Refills: 0 | Status: SHIPPED | OUTPATIENT
Start: 2020-04-30 | End: 2020-06-04 | Stop reason: SDUPTHER

## 2020-06-04 ENCOUNTER — OFFICE VISIT (OUTPATIENT)
Dept: ORTHOPEDIC SURGERY | Facility: CLINIC | Age: 70
End: 2020-06-04

## 2020-06-04 VITALS — HEIGHT: 62 IN | WEIGHT: 120 LBS | BODY MASS INDEX: 22.08 KG/M2

## 2020-06-04 DIAGNOSIS — Z96.652 STATUS POST TOTAL LEFT KNEE REPLACEMENT: Primary | ICD-10-CM

## 2020-06-04 PROCEDURE — 99212 OFFICE O/P EST SF 10 MIN: CPT | Performed by: ORTHOPAEDIC SURGERY

## 2020-06-04 PROCEDURE — 73562 X-RAY EXAM OF KNEE 3: CPT | Performed by: ORTHOPAEDIC SURGERY

## 2020-06-04 RX ORDER — ESTRADIOL 0.1 MG/G
CREAM VAGINAL
COMMUNITY
Start: 2020-05-15 | End: 2021-02-04 | Stop reason: SDUPTHER

## 2020-06-04 RX ORDER — MELOXICAM 7.5 MG/1
7.5 TABLET ORAL DAILY
Qty: 30 TABLET | Refills: 0 | Status: SHIPPED | OUTPATIENT
Start: 2020-06-04 | End: 2020-07-21 | Stop reason: SDUPTHER

## 2020-06-04 RX ORDER — EPINEPHRINE 0.3 MG/.3ML
INJECTION SUBCUTANEOUS
COMMUNITY
Start: 2020-02-26

## 2020-06-04 ASSESSMENT — KOOS JR
KOOS JR SCORE: 84.6
KOOS JR SCORE: 2

## 2020-06-04 NOTE — PROGRESS NOTES
Subjective:     Patient ID: Nikkie Kennedy is a 69 y.o. female.    Chief Complaint: s/p left total knee arthroplasty, DOS 4/22/2019    History of Present Illness  Nikkie Kennedy returns to clinic today for evaluation of her left knee. She states she is doing very well, she denies any residual pain at this time. She denies associated numbness or tingling.  She is able to perform daily activities and walking without difficulty.      Social History     Occupational History   • Not on file   Tobacco Use   • Smoking status: Never Smoker   • Smokeless tobacco: Never Used   Substance and Sexual Activity   • Alcohol use: No   • Drug use: No   • Sexual activity: Never     Partners: Male     Birth control/protection: Post-menopausal     Comment:       Past Medical History:   Diagnosis Date   • Abnormal vaginal Pap smear 09/10/1999    ascus   • Abnormal vaginal Pap smear 11/06/2000    lgsil   • Arthritis    • Asthma     h/o, no inhaler   • Disease of thyroid gland     hypothyroid   • DJD (degenerative joint disease) of knee     sched TKA left   • History of colposcopy 10/11/1999    chronic endocervicitis   • History of UTI    • Hole in the ear drum, right     d/t tube   • Osteoporosis    • Spinal headache      Past Surgical History:   Procedure Laterality Date   • BREAST CYST ASPIRATION Right     20 yrs ago   • BREAST CYST INCISION AND DRAINAGE  12/1996, 1997   • CHOLECYSTECTOMY  03/2016   • COLONOSCOPY     • EAR TUBES     • TOTAL KNEE ARTHROPLASTY Left 4/22/2019    Procedure: left total knee arthroplasty, possible placement of ON-Q pump, and all associated procedures;  Surgeon: Dhaval Quinn MD;  Location: [facility];  Service: Orthopedics   • TUBAL ABDOMINAL LIGATION     • VARICOSE VEIN SURGERY  2015    Pt states she had varicose veins removed on both legs (2-3 years ago).       Family History   Problem Relation Age of Onset   • Cancer Mother    • Colon cancer Mother 76   • Clotting disorder Other         does not  "know name   • Deep vein thrombosis Other    • Breast cancer Neg Hx    • Ovarian cancer Neg Hx    • Malig Hyperthermia Neg Hx          Review of Systems   Constitutional: Negative for chills, diaphoresis, fever and unexpected weight change.   HENT: Negative for hearing loss, nosebleeds, sore throat and tinnitus.    Eyes: Negative for pain and visual disturbance.   Respiratory: Negative for cough, shortness of breath and wheezing.    Cardiovascular: Negative for chest pain and palpitations.   Gastrointestinal: Negative for abdominal pain, diarrhea, nausea and vomiting.   Endocrine: Negative for cold intolerance, heat intolerance and polydipsia.   Genitourinary: Negative for difficulty urinating, dysuria and hematuria.   Musculoskeletal: Positive for arthralgias. Negative for joint swelling and myalgias.   Skin: Negative for rash and wound.   Allergic/Immunologic: Negative for environmental allergies.   Neurological: Negative for dizziness, syncope and numbness.   Hematological: Does not bruise/bleed easily.   Psychiatric/Behavioral: Negative for dysphoric mood and sleep disturbance. The patient is not nervous/anxious.            Objective:  Vitals:    06/04/20 0953   Weight: 54.4 kg (120 lb)   Height: 157.5 cm (62\")         06/04/20  0953   Weight: 54.4 kg (120 lb)     Body mass index is 21.95 kg/m².  General: No acute distress.  Resp: normal respiratory effort  Skin: no rashes or wounds; normal turgor  Psych: mood and affect appropriate; recent and remote memory intact      Ortho Exam     Left Knee-   Incision well-healed  ROM 0-130 degrees  5/5 on flexion  5/5 on extension  Effusion- none   No opening on varus and valgus stress at 0 and 30  Log roll-  negative  Stinchfield-  negative  Positive sensation light tough all distributions symmetric to contralateral side  Brisk cap refill all digits  2+ dorsalis pedis pulse    Imaging:  Left Knee X-Ray  Indication: s/p total knee     AP, Lateral, and Sunrise " views    Findings:  Total knee arthroplasty components are in stable position with acceptable overall alignment, no evidence of periprosthetic fracture, loosening, osteolysis, or reactive bone formation.    Compared to prior postoperative x-rays  Assessment:        1. Status post total left knee replacement           Plan:          1. Discussed treatment options at length with patient at today's visit.   Discussed need for prophylactic antibiotics with dental/endoscopy procedures.   Advised patient to continue at-home exercise program for improvement in strength, range of motion and function with daily activities.  Refilled Meloxicam today.   Follow-up in 1 year with repeat xray of left knee.      Nikkie Kennedy was in agreement with plan and had all questions answered.     Orders:  Orders Placed This Encounter   Procedures   • XR Knee 3 View Left       Medications:  New Medications Ordered This Visit   Medications   • meloxicam (MOBIC) 7.5 MG tablet     Sig: Take 1 tablet by mouth Daily.     Dispense:  30 tablet     Refill:  0       Followup:  Return in about 1 year (around 6/4/2021) for xrays needed at follow up.    Nikkie was seen today for follow-up.    Diagnoses and all orders for this visit:    Status post total left knee replacement  -     XR Knee 3 View Left    Other orders  -     meloxicam (MOBIC) 7.5 MG tablet; Take 1 tablet by mouth Daily.        By signing my name here, I Bonny Leyva, attest that all documentation on 06/04/20 at 16:49 has been prepared under the direction and in the presence of Dr. Dhaval Quinn.    I, Dr. Dhaval Quinn, personally performed the services described in this documentation, as scribed by Bonny Leyva, in my presence, and it is both accurate and complete.    Dictated utilizing Dragon dictation

## 2020-07-21 RX ORDER — MELOXICAM 7.5 MG/1
7.5 TABLET ORAL DAILY
Qty: 30 TABLET | Refills: 2 | Status: SHIPPED | OUTPATIENT
Start: 2020-07-21 | End: 2020-07-23 | Stop reason: SDUPTHER

## 2020-07-23 RX ORDER — MELOXICAM 7.5 MG/1
7.5 TABLET ORAL DAILY
Qty: 30 TABLET | Refills: 2 | Status: SHIPPED | OUTPATIENT
Start: 2020-07-23 | End: 2020-10-21

## 2020-09-16 RX ORDER — CEPHALEXIN 500 MG/1
CAPSULE ORAL
Qty: 4 CAPSULE | Refills: 0 | Status: SHIPPED | OUTPATIENT
Start: 2020-09-16

## 2020-10-21 RX ORDER — MELOXICAM 7.5 MG/1
7.5 TABLET ORAL DAILY
Qty: 30 TABLET | Refills: 1 | Status: SHIPPED | OUTPATIENT
Start: 2020-10-21 | End: 2020-12-22 | Stop reason: SDUPTHER

## 2020-12-22 RX ORDER — MELOXICAM 7.5 MG/1
7.5 TABLET ORAL DAILY
Qty: 90 TABLET | Refills: 3 | Status: SHIPPED | OUTPATIENT
Start: 2020-12-22 | End: 2022-02-02

## 2021-02-04 ENCOUNTER — OFFICE VISIT (OUTPATIENT)
Dept: OBSTETRICS AND GYNECOLOGY | Facility: CLINIC | Age: 71
End: 2021-02-04

## 2021-02-04 VITALS
WEIGHT: 126 LBS | BODY MASS INDEX: 23.19 KG/M2 | HEIGHT: 62 IN | SYSTOLIC BLOOD PRESSURE: 102 MMHG | DIASTOLIC BLOOD PRESSURE: 60 MMHG

## 2021-02-04 DIAGNOSIS — Z11.51 ENCOUNTER FOR SCREENING FOR HUMAN PAPILLOMAVIRUS (HPV): ICD-10-CM

## 2021-02-04 DIAGNOSIS — R10.31 RLQ ABDOMINAL PAIN: ICD-10-CM

## 2021-02-04 DIAGNOSIS — Z12.31 ENCOUNTER FOR SCREENING MAMMOGRAM FOR MALIGNANT NEOPLASM OF BREAST: ICD-10-CM

## 2021-02-04 DIAGNOSIS — N95.2 VAGINAL ATROPHY: ICD-10-CM

## 2021-02-04 DIAGNOSIS — Z01.419 PAP SMEAR, LOW-RISK: Primary | ICD-10-CM

## 2021-02-04 DIAGNOSIS — Z13.9 SCREENING FOR CONDITION: ICD-10-CM

## 2021-02-04 DIAGNOSIS — Z78.0 ASYMPTOMATIC MENOPAUSAL STATE: ICD-10-CM

## 2021-02-04 LAB
BILIRUB BLD-MCNC: NEGATIVE MG/DL
CLARITY, POC: CLEAR
COLOR UR: YELLOW
GLUCOSE UR STRIP-MCNC: NEGATIVE MG/DL
KETONES UR QL: NEGATIVE
LEUKOCYTE EST, POC: NEGATIVE
NITRITE UR-MCNC: NEGATIVE MG/ML
PH UR: 5 [PH] (ref 5–8)
PROT UR STRIP-MCNC: NEGATIVE MG/DL
RBC # UR STRIP: NEGATIVE /UL
SP GR UR: 1 (ref 1–1.03)
UROBILINOGEN UR QL: NORMAL

## 2021-02-04 PROCEDURE — 81002 URINALYSIS NONAUTO W/O SCOPE: CPT | Performed by: OBSTETRICS & GYNECOLOGY

## 2021-02-04 PROCEDURE — 99213 OFFICE O/P EST LOW 20 MIN: CPT | Performed by: OBSTETRICS & GYNECOLOGY

## 2021-02-04 PROCEDURE — 99397 PER PM REEVAL EST PAT 65+ YR: CPT | Performed by: OBSTETRICS & GYNECOLOGY

## 2021-02-04 RX ORDER — ESTRADIOL 0.1 MG/G
1 CREAM VAGINAL 2 TIMES WEEKLY
Qty: 42.5 G | Refills: 2 | Status: SHIPPED | OUTPATIENT
Start: 2021-02-04 | End: 2022-02-17 | Stop reason: SDUPTHER

## 2021-02-04 NOTE — PROGRESS NOTES
GYN Annual Exam     CC- Here for annual exam.     Nikkie Kennedy is a 70 y.o. female est who presents for annual well woman exam. She underwent menopause in her mid 50s and took HRT x 10 years, none now.No  VB. She lost a sister to COVID this summer. She never ended up taking her Fosamax for osteoporosis. She is complaining of RLQ pain off and on.       OB History        2    Para   2    Term   0            AB        Living   2       SAB        TAB        Ectopic        Molar        Multiple        Live Births              Obstetric Comments   2              Menarche:9  Menopause:mid 50s  HRT: x 10 years, none now  Current contraception: post menopausal status and tubal ligation  History of abnormal Pap smear: no  History of abnormal mammogram: yes - breast cyst drained  Family history of uterine, colon or ovarian cancer: yes - mom colon age 76  Family history of breast cancer: no  STD's: none  Last pap: 2018- nl pap/HPV  SHASHI: family h/o clotting disorder, pt does not know name    Health Maintenance   Topic Date Due   • TDAP/TD VACCINES (1 - Tdap) 1968   • ZOSTER VACCINE (1 of 2) 1999   • COLONOSCOPY  2013   • Pneumococcal Vaccine 65+ (1 of 1 - PPSV23) 2014   • HEPATITIS C SCREENING  2018   • ANNUAL WELLNESS VISIT  2018   • DXA SCAN  2020   • INFLUENZA VACCINE  2020   • MAMMOGRAM  2022   • MENINGOCOCCAL VACCINE  Aged Out       Past Medical History:   Diagnosis Date   • Abnormal vaginal Pap smear 09/10/1999    ascus   • Abnormal vaginal Pap smear 2000    lgsil   • Arthritis    • Asthma     h/o, no inhaler   • Disease of thyroid gland     hypothyroid   • DJD (degenerative joint disease) of knee     sched TKA left   • History of colposcopy 10/11/1999    chronic endocervicitis   • History of UTI    • Hole in the ear drum, right     d/t tube   • Osteoporosis    • Spinal headache        Past Surgical History:   Procedure Laterality Date   •  BREAST CYST ASPIRATION Right     20 yrs ago   • BREAST CYST INCISION AND DRAINAGE  12/1996, 1997   • CHOLECYSTECTOMY  03/2016   • COLONOSCOPY     • EAR TUBES     • TOTAL KNEE ARTHROPLASTY Left 4/22/2019    Procedure: left total knee arthroplasty, possible placement of ON-Q pump, and all associated procedures;  Surgeon: Dhaval Quinn MD;  Location: [facility];  Service: Orthopedics   • TUBAL ABDOMINAL LIGATION     • VARICOSE VEIN SURGERY  2015    Pt states she had varicose veins removed on both legs (2-3 years ago).         Current Outpatient Medications:   •  acetaminophen (TYLENOL) 500 MG tablet, Take 2 tablets by mouth 3 (Three) Times a Day., Disp: , Rfl:   •  ALLERGY SERUM INJECTION, Inject  under the skin into the appropriate area as directed Every 7 (Seven) Days., Disp: , Rfl:   •  cephalexin (KEFLEX) 500 MG capsule, TAKE TWO GRAMS ONE HOUR PRIOR TO DENTAL APPOINTMENT., Disp: 4 capsule, Rfl: 0  •  clindamycin (CLEOCIN) 300 MG capsule, Take 2 pills 1 hour prior to dental procedure, Disp: 2 capsule, Rfl: 3  •  EPINEPHrine (EPIPEN) 0.3 MG/0.3ML solution auto-injector injection, , Disp: , Rfl:   •  estradiol (ESTRACE) 0.1 MG/GM vaginal cream, Insert 1 g into the vagina 2 (Two) Times a Week., Disp: 42.5 g, Rfl: 2  •  levothyroxine (SYNTHROID, LEVOTHROID) 50 MCG tablet, Take 50 mcg by mouth Daily., Disp: , Rfl:   •  meloxicam (MOBIC) 7.5 MG tablet, Take 1 tablet by mouth Daily., Disp: 90 tablet, Rfl: 3    Allergies   Allergen Reactions   • Hydrocodone Nausea And Vomiting   • Codeine Nausea And Vomiting   • Omnicef [Cefdinir] Hives   • Oxycodone Nausea And Vomiting       Social History     Tobacco Use   • Smoking status: Never Smoker   • Smokeless tobacco: Never Used   Substance Use Topics   • Alcohol use: No   • Drug use: No       Family History   Problem Relation Age of Onset   • Cancer Mother    • Colon cancer Mother 76   • Clotting disorder Other         does not know name   • Deep vein thrombosis Other    •  "Breast cancer Neg Hx    • Ovarian cancer Neg Hx    • Malig Hyperthermia Neg Hx        Review of Systems   Constitutional: Positive for activity change. Negative for appetite change, fatigue, fever and unexpected weight change.   Respiratory: Negative for cough and shortness of breath.    Cardiovascular: Negative for chest pain and palpitations.   Gastrointestinal: Negative for abdominal distention, abdominal pain, constipation, diarrhea and nausea.   Endocrine: Negative for cold intolerance and heat intolerance.   Genitourinary: Positive for pelvic pain (RLQ). Negative for dyspareunia, dysuria, genital sores, hematuria, menstrual problem, vaginal bleeding and vaginal discharge.   Musculoskeletal: Positive for joint swelling (L knee pain).   Skin: Negative for color change and rash.   Neurological: Negative for headaches.   Hematological: Negative for adenopathy. Does not bruise/bleed easily.   Psychiatric/Behavioral: Negative for dysphoric mood. The patient is not nervous/anxious.        /60   Ht 157.5 cm (62\")   Wt 57.2 kg (126 lb)   Breastfeeding No   BMI 23.05 kg/m²     Physical Exam   Constitutional: She is oriented to person, place, and time. She appears well-developed.   HENT:   Head: Normocephalic and atraumatic.   Eyes: Conjunctivae are normal. No scleral icterus.   Neck: Neck supple. No thyromegaly present.   Cardiovascular: Normal rate, regular rhythm and normal heart sounds.   Pulmonary/Chest: Effort normal and breath sounds normal. Right breast exhibits no inverted nipple, no mass, no nipple discharge, no skin change and no tenderness. Left breast exhibits no inverted nipple, no mass, no nipple discharge, no skin change and no tenderness.   Abdominal: Soft. Normal appearance and bowel sounds are normal. She exhibits no distension and no mass. There is no abdominal tenderness. There is no rebound and no guarding. No hernia.   Genitourinary:          Pelvic exam was performed with patient supine. "   There is no rash, tenderness or lesion on the right labia. There is no rash, tenderness or lesion on the left labia. Uterus is not deviated, not enlarged, not fixed and not tender. Cervix exhibits no motion tenderness, no discharge and no friability. Right adnexum displays no mass, no tenderness and no fullness. Left adnexum displays no mass, no tenderness and no fullness.    Vaginal tenderness present.      No vaginal discharge, erythema or bleeding.   There is tenderness in the vagina. No erythema or bleeding in the vagina.    No foreign body in the vagina.      No signs of injury in the vagina.      Genitourinary Comments: Urethral eversion noted  Moderate atrophy noted  LPS noted     Neurological: She is alert and oriented to person, place, and time.   Skin: Skin is warm and dry.   Psychiatric: Her behavior is normal. Mood, judgment and thought content normal.   Nursing note and vitals reviewed.         Assessment/Plan    1) GYN HM: check pap.  SBE demonstrated and encouraged.  2) STD screening: declines Condoms encouraged.  3) Bone health - Weight bearing exercise, dietary calcium recommendations and vitamin D reviewed.   4) Diet and Exercise discussed  5) Smoking Status: non smoker  6) Social:   7)MMG:  UTD 1/2020 B1, sched MMG now  8) DEXA- UTD 3/2018, pt did not take Fosamax. She is undecided if she will take meds but wants to know if her BMD is worsening. Check DEXA  9)C scope- UTD 2016, repeat 5 years. Pt is going to contact Dr PETE herself  10) Urethral eversion and vaginal atrophy- rec Estrace cream. Patient counseled that vaginal estrogen rings, creams and tablets are available and highly effective at treating local vaginal symptoms such as atrophy and vaginal dryness.  Vaginal estrogen does not cause uterine overgrowth and does NOT require a progestogen to protect the uterus.  Very small amounts of estrogen are absorbed systemically.  For patients with a history of an estrogen dependent cancer  such as breast cancer, the decision to use local estrogen for local vaginal symptoms should be made after consultation with her oncologist.  Possible side effects include local irritation or burning and/or vaginal bleeding and should always be reported.    11) Vaginismus- pt asymptomatic, declines PT  12) RLQ pain- schedule TVUS and visit within the next 2 weeks.  13) Parts of this document have been copied or forwarded from her previous visits and have been reviewed, updated and edited as indicated.   14)I saw the patient with a face mask, gloves and eye protection  The patient herself was masked.  Social distancing was observed as appropriate.  15)Follow up in TVUS and appt  and 2 years annual     Diagnoses and all orders for this visit:    1. Pap smear, low-risk (Primary)  -     Pap IG (Image Guided)    2. Screening for condition  -     POC Urinalysis Dipstick  -     Mammo Screening Bilateral With CAD; Future  -     DEXA Bone Density Axial; Future    3. Encounter for screening for human papillomavirus (HPV)  -     Pap IG (Image Guided)    4. Vaginal atrophy    5. Encounter for screening mammogram for malignant neoplasm of breast   -     Mammo Screening Bilateral With CAD; Future    6. Asymptomatic menopausal state   -     DEXA Bone Density Axial; Future    7. RLQ abdominal pain    Other orders  -     estradiol (ESTRACE) 0.1 MG/GM vaginal cream; Insert 1 g into the vagina 2 (Two) Times a Week.  Dispense: 42.5 g; Refill: 2        Jazzmine Reyes MD  2/4/2021  20:03 EST

## 2021-02-08 LAB
CYTOLOGIST CVX/VAG CYTO: NORMAL
CYTOLOGY CVX/VAG DOC CYTO: NORMAL
CYTOLOGY CVX/VAG DOC THIN PREP: NORMAL
DX ICD CODE: NORMAL
HIV 1 & 2 AB SER-IMP: NORMAL
OTHER STN SPEC: NORMAL
STAT OF ADQ CVX/VAG CYTO-IMP: NORMAL

## 2021-03-23 ENCOUNTER — HOSPITAL ENCOUNTER (OUTPATIENT)
Dept: MAMMOGRAPHY | Facility: HOSPITAL | Age: 71
Discharge: HOME | End: 2021-03-23

## 2021-03-23 ENCOUNTER — APPOINTMENT (OUTPATIENT)
Dept: BONE DENSITY | Facility: HOSPITAL | Age: 71
End: 2021-03-23

## 2021-03-23 DIAGNOSIS — Z12.31 ENCOUNTER FOR SCREENING MAMMOGRAM FOR MALIGNANT NEOPLASM OF BREAST: ICD-10-CM

## 2021-03-23 DIAGNOSIS — Z13.9 SCREENING FOR CONDITION: ICD-10-CM

## 2021-03-23 DIAGNOSIS — Z78.0 ASYMPTOMATIC MENOPAUSAL STATE: ICD-10-CM

## 2021-03-23 PROCEDURE — 77067 SCR MAMMO BI INCL CAD: CPT

## 2021-03-23 PROCEDURE — 77063 BREAST TOMOSYNTHESIS BI: CPT

## 2021-03-23 PROCEDURE — 77080 DXA BONE DENSITY AXIAL: CPT

## 2021-03-23 NOTE — PROGRESS NOTES
PIP= DEXA shows osteoporosis that is stable to slightly improved. I still rec that she take Fosamax 70 mg weekly.

## 2021-04-06 ENCOUNTER — APPOINTMENT (OUTPATIENT)
Dept: GENERAL RADIOLOGY | Facility: HOSPITAL | Age: 71
End: 2021-04-06

## 2021-04-06 ENCOUNTER — APPOINTMENT (OUTPATIENT)
Dept: CT IMAGING | Facility: HOSPITAL | Age: 71
End: 2021-04-06

## 2021-04-06 ENCOUNTER — HOSPITAL ENCOUNTER (EMERGENCY)
Facility: HOSPITAL | Age: 71
Discharge: HOME | End: 2021-04-06
Attending: EMERGENCY MEDICINE | Admitting: EMERGENCY MEDICINE

## 2021-04-06 VITALS
BODY MASS INDEX: 26.35 KG/M2 | WEIGHT: 143.2 LBS | HEART RATE: 94 BPM | HEIGHT: 62 IN | RESPIRATION RATE: 14 BRPM | OXYGEN SATURATION: 95 % | SYSTOLIC BLOOD PRESSURE: 92 MMHG | DIASTOLIC BLOOD PRESSURE: 64 MMHG | TEMPERATURE: 99.6 F

## 2021-04-06 DIAGNOSIS — R10.11 RIGHT UPPER QUADRANT ABDOMINAL PAIN: Primary | ICD-10-CM

## 2021-04-06 DIAGNOSIS — R50.9 FEVER, UNSPECIFIED FEVER CAUSE: ICD-10-CM

## 2021-04-06 DIAGNOSIS — R10.31 RIGHT LOWER QUADRANT ABDOMINAL PAIN: ICD-10-CM

## 2021-04-06 LAB
ALBUMIN SERPL-MCNC: 4 G/DL (ref 3.5–5.2)
ALBUMIN/GLOB SERPL: 1.5 G/DL
ALP SERPL-CCNC: 88 U/L (ref 39–117)
ALT SERPL W P-5'-P-CCNC: 34 U/L (ref 1–33)
ANION GAP SERPL CALCULATED.3IONS-SCNC: 10 MMOL/L (ref 5–15)
AST SERPL-CCNC: 37 U/L (ref 1–32)
BASOPHILS # BLD AUTO: 0.03 10*3/MM3 (ref 0–0.2)
BASOPHILS NFR BLD AUTO: 0.6 % (ref 0–1.5)
BILIRUB SERPL-MCNC: 0.4 MG/DL (ref 0–1.2)
BILIRUB UR QL STRIP: NEGATIVE
BUN SERPL-MCNC: 16 MG/DL (ref 8–23)
BUN/CREAT SERPL: 18.2 (ref 7–25)
CALCIUM SPEC-SCNC: 9.1 MG/DL (ref 8.6–10.5)
CHLORIDE SERPL-SCNC: 105 MMOL/L (ref 98–107)
CLARITY UR: CLEAR
CO2 SERPL-SCNC: 23 MMOL/L (ref 22–29)
COLOR UR: YELLOW
CREAT SERPL-MCNC: 0.88 MG/DL (ref 0.57–1)
CRP SERPL-MCNC: 0.44 MG/DL (ref 0–0.5)
D-LACTATE SERPL-SCNC: 0.8 MMOL/L (ref 0.5–2)
DEPRECATED RDW RBC AUTO: 44.1 FL (ref 37–54)
EOSINOPHIL # BLD AUTO: 0 10*3/MM3 (ref 0–0.4)
EOSINOPHIL NFR BLD AUTO: 0 % (ref 0.3–6.2)
ERYTHROCYTE [DISTWIDTH] IN BLOOD BY AUTOMATED COUNT: 13.1 % (ref 12.3–15.4)
ERYTHROCYTE [SEDIMENTATION RATE] IN BLOOD: 8 MM/HR (ref 0–20)
GFR SERPL CREATININE-BSD FRML MDRD: 63 ML/MIN/1.73
GLOBULIN UR ELPH-MCNC: 2.7 GM/DL
GLUCOSE SERPL-MCNC: 97 MG/DL (ref 65–99)
GLUCOSE UR STRIP-MCNC: NEGATIVE MG/DL
HCT VFR BLD AUTO: 41.3 % (ref 34–46.6)
HGB BLD-MCNC: 13.5 G/DL (ref 12–15.9)
HGB UR QL STRIP.AUTO: NEGATIVE
IMM GRANULOCYTES # BLD AUTO: 0.02 10*3/MM3 (ref 0–0.05)
IMM GRANULOCYTES NFR BLD AUTO: 0.4 % (ref 0–0.5)
KETONES UR QL STRIP: ABNORMAL
LEUKOCYTE ESTERASE UR QL STRIP.AUTO: NEGATIVE
LYMPHOCYTES # BLD AUTO: 0.55 10*3/MM3 (ref 0.7–3.1)
LYMPHOCYTES NFR BLD AUTO: 10.6 % (ref 19.6–45.3)
MCH RBC QN AUTO: 30.3 PG (ref 26.6–33)
MCHC RBC AUTO-ENTMCNC: 32.7 G/DL (ref 31.5–35.7)
MCV RBC AUTO: 92.8 FL (ref 79–97)
MONOCYTES # BLD AUTO: 0.28 10*3/MM3 (ref 0.1–0.9)
MONOCYTES NFR BLD AUTO: 5.4 % (ref 5–12)
NEUTROPHILS NFR BLD AUTO: 4.31 10*3/MM3 (ref 1.7–7)
NEUTROPHILS NFR BLD AUTO: 83 % (ref 42.7–76)
NITRITE UR QL STRIP: NEGATIVE
NRBC BLD AUTO-RTO: 0 /100 WBC (ref 0–0.2)
PH UR STRIP.AUTO: 5.5 [PH] (ref 4.5–8)
PLATELET # BLD AUTO: 180 10*3/MM3 (ref 140–450)
PMV BLD AUTO: 10.1 FL (ref 6–12)
POTASSIUM SERPL-SCNC: 4.1 MMOL/L (ref 3.5–5.2)
PROT SERPL-MCNC: 6.7 G/DL (ref 6–8.5)
PROT UR QL STRIP: NEGATIVE
RBC # BLD AUTO: 4.45 10*6/MM3 (ref 3.77–5.28)
SARS-COV-2 RNA PNL SPEC NAA+PROBE: NOT DETECTED
SODIUM SERPL-SCNC: 138 MMOL/L (ref 136–145)
SP GR UR STRIP: 1.02 (ref 1–1.03)
UROBILINOGEN UR QL STRIP: ABNORMAL
WBC # BLD AUTO: 5.19 10*3/MM3 (ref 3.4–10.8)

## 2021-04-06 PROCEDURE — 99284 EMERGENCY DEPT VISIT MOD MDM: CPT

## 2021-04-06 PROCEDURE — 85025 COMPLETE CBC W/AUTO DIFF WBC: CPT | Performed by: EMERGENCY MEDICINE

## 2021-04-06 PROCEDURE — 80053 COMPREHEN METABOLIC PANEL: CPT | Performed by: EMERGENCY MEDICINE

## 2021-04-06 PROCEDURE — 83605 ASSAY OF LACTIC ACID: CPT | Performed by: EMERGENCY MEDICINE

## 2021-04-06 PROCEDURE — 74177 CT ABD & PELVIS W/CONTRAST: CPT

## 2021-04-06 PROCEDURE — 85652 RBC SED RATE AUTOMATED: CPT | Performed by: EMERGENCY MEDICINE

## 2021-04-06 PROCEDURE — 81003 URINALYSIS AUTO W/O SCOPE: CPT | Performed by: EMERGENCY MEDICINE

## 2021-04-06 PROCEDURE — 0 IOPAMIDOL PER 1 ML: Performed by: EMERGENCY MEDICINE

## 2021-04-06 PROCEDURE — 71045 X-RAY EXAM CHEST 1 VIEW: CPT

## 2021-04-06 PROCEDURE — 86140 C-REACTIVE PROTEIN: CPT | Performed by: EMERGENCY MEDICINE

## 2021-04-06 PROCEDURE — 99283 EMERGENCY DEPT VISIT LOW MDM: CPT | Performed by: EMERGENCY MEDICINE

## 2021-04-06 PROCEDURE — 87635 SARS-COV-2 COVID-19 AMP PRB: CPT | Performed by: EMERGENCY MEDICINE

## 2021-04-06 RX ORDER — SODIUM CHLORIDE 0.9 % (FLUSH) 0.9 %
10 SYRINGE (ML) INJECTION AS NEEDED
Status: DISCONTINUED | OUTPATIENT
Start: 2021-04-06 | End: 2021-04-06 | Stop reason: HOSPADM

## 2021-04-06 RX ORDER — ACETAMINOPHEN 500 MG
1000 TABLET ORAL ONCE
Status: COMPLETED | OUTPATIENT
Start: 2021-04-06 | End: 2021-04-06

## 2021-04-06 RX ADMIN — ACETAMINOPHEN 1000 MG: 500 TABLET, FILM COATED ORAL at 07:44

## 2021-04-06 RX ADMIN — IOPAMIDOL 100 ML: 755 INJECTION, SOLUTION INTRAVENOUS at 08:47

## 2021-04-06 NOTE — ED PROVIDER NOTES
Subjective   History of Present Illness  70-year-old female presents to the emergency room complaining of right-sided abdominal and pelvic pain.  She says she is had it for about 2 months but is been worse last couple days.  She denies any associated nausea or vomiting, denies any associated constipation or diarrhea.  She reports that she did have a fever started yesterday through the night and this morning.  She says she also has some urinary urgency and some low back pain and thinks it is possible she could have a urinary tract infection.  She reports that she has seen gynecology for the pelvic pain who is going to do an ultrasound but had to get canceled and has not happened yet.  Review of Systems   Constitutional: Positive for fever. Negative for chills and fatigue.   Respiratory: Negative for cough and shortness of breath.    Cardiovascular: Negative for chest pain and leg swelling.   Gastrointestinal: Positive for abdominal pain. Negative for constipation, diarrhea, nausea and vomiting.   Genitourinary: Positive for urgency.   Neurological: Negative for dizziness and weakness.       Past Medical History:   Diagnosis Date   • Abnormal vaginal Pap smear 09/10/1999    ascus   • Abnormal vaginal Pap smear 11/06/2000    lgsil   • Arthritis    • Asthma     h/o, no inhaler   • Disease of thyroid gland     hypothyroid   • DJD (degenerative joint disease) of knee     sched TKA left   • History of colposcopy 10/11/1999    chronic endocervicitis   • History of UTI    • Hole in the ear drum, right     d/t tube   • Osteoporosis    • Spinal headache        Allergies   Allergen Reactions   • Hydrocodone Nausea And Vomiting   • Codeine Nausea And Vomiting   • Omnicef [Cefdinir] Hives   • Oxycodone Nausea And Vomiting       Past Surgical History:   Procedure Laterality Date   • BREAST CYST ASPIRATION Right     20 yrs ago   • BREAST CYST INCISION AND DRAINAGE  12/1996, 1997   • CHOLECYSTECTOMY  03/2016   • COLONOSCOPY     •  EAR TUBES     • TOTAL KNEE ARTHROPLASTY Left 4/22/2019    Procedure: left total knee arthroplasty, possible placement of ON-Q pump, and all associated procedures;  Surgeon: Dhaval Quinn MD;  Location: [facility];  Service: Orthopedics   • TUBAL ABDOMINAL LIGATION     • VARICOSE VEIN SURGERY  2015    Pt states she had varicose veins removed on both legs (2-3 years ago).       Family History   Problem Relation Age of Onset   • Cancer Mother    • Colon cancer Mother 76   • Clotting disorder Other         does not know name   • Deep vein thrombosis Other    • Breast cancer Neg Hx    • Ovarian cancer Neg Hx    • Malig Hyperthermia Neg Hx        Social History     Socioeconomic History   • Marital status:      Spouse name: Not on file   • Number of children: Not on file   • Years of education: Not on file   • Highest education level: Not on file   Tobacco Use   • Smoking status: Never Smoker   • Smokeless tobacco: Never Used   Substance and Sexual Activity   • Alcohol use: No   • Drug use: No   • Sexual activity: Not Currently     Partners: Male     Birth control/protection: Post-menopausal     Comment:            Objective    ED Triage Vitals [04/06/21 0719]   Temp Heart Rate Resp BP SpO2   98.8 °F (37.1 °C) 110 14 144/84 96 %      Temp src Heart Rate Source Patient Position BP Location FiO2 (%)   Oral Monitor Sitting Right arm --       Physical Exam  INITIAL VITAL SIGNS: Reviewed by me.  Pulse ox normal  GENERAL: Alert and interactive. No acute distress.  HEAD: Head is normocephalic.  EYES: EOMI. PERRL. No scleral icterus. No conjunctival injection.  ENT: Moist mucous membranes.   NECK: Supple. Full range of motion.  RESPIRATORY: No tachypnea. Clear breath sounds bilaterally. No wheezing. No rales. No rhonchi.  CV: Regular rate and rhythm. No murmurs. No rubs or gallops.  ABDOMEN: Soft, non-distended, tender to palpation right abdomen and right pelvis without rebound or guarding  BACK:  No obvious  deformity.  EXTREMITIES: No deformity. No clubbing or cyanosis. No edema.   SKIN: Warm and dry. No diaphoresis. No obvious rashes.   NEUROLOGIC: Alert and oriented. Face is symmetric. t.     Results for orders placed or performed during the hospital encounter of 04/06/21   COVID-19,[facility] IN-HOUSE,Nasal Swab No Transport Media 3-4 HR TAT - Swab, Nasal Cavity    Specimen: Nasal Cavity; Swab   Result Value Ref Range    COVID19 Not Detected Not Detected - Ref. Range   Urinalysis With Microscopic If Indicated (No Culture) - Urine, Clean Catch    Specimen: Urine, Clean Catch   Result Value Ref Range    Color, UA Yellow Yellow, Straw    Appearance, UA Clear Clear    pH, UA 5.5 4.5 - 8.0    Specific Gravity, UA 1.020 1.003 - 1.030    Glucose, UA Negative Negative    Ketones, UA 15 mg/dL (1+) (A) Negative    Bilirubin, UA Negative Negative    Blood, UA Negative Negative    Protein, UA Negative Negative    Leuk Esterase, UA Negative Negative    Nitrite, UA Negative Negative    Urobilinogen, UA 0.2 E.U./dL 0.2 - 1.0 E.U./dL   Comprehensive Metabolic Panel    Specimen: Blood   Result Value Ref Range    Glucose 97 65 - 99 mg/dL    BUN 16 8 - 23 mg/dL    Creatinine 0.88 0.57 - 1.00 mg/dL    Sodium 138 136 - 145 mmol/L    Potassium 4.1 3.5 - 5.2 mmol/L    Chloride 105 98 - 107 mmol/L    CO2 23.0 22.0 - 29.0 mmol/L    Calcium 9.1 8.6 - 10.5 mg/dL    Total Protein 6.7 6.0 - 8.5 g/dL    Albumin 4.00 3.50 - 5.20 g/dL    ALT (SGPT) 34 (H) 1 - 33 U/L    AST (SGOT) 37 (H) 1 - 32 U/L    Alkaline Phosphatase 88 39 - 117 U/L    Total Bilirubin 0.4 0.0 - 1.2 mg/dL    eGFR Non African Amer 63 >60 mL/min/1.73    Globulin 2.7 gm/dL    A/G Ratio 1.5 g/dL    BUN/Creatinine Ratio 18.2 7.0 - 25.0    Anion Gap 10.0 5.0 - 15.0 mmol/L   CBC Auto Differential    Specimen: Blood   Result Value Ref Range    WBC 5.19 3.40 - 10.80 10*3/mm3    RBC 4.45 3.77 - 5.28 10*6/mm3    Hemoglobin 13.5 12.0 - 15.9 g/dL    Hematocrit 41.3 34.0 - 46.6 %    MCV 92.8  79.0 - 97.0 fL    MCH 30.3 26.6 - 33.0 pg    MCHC 32.7 31.5 - 35.7 g/dL    RDW 13.1 12.3 - 15.4 %    RDW-SD 44.1 37.0 - 54.0 fl    MPV 10.1 6.0 - 12.0 fL    Platelets 180 140 - 450 10*3/mm3    Neutrophil % 83.0 (H) 42.7 - 76.0 %    Lymphocyte % 10.6 (L) 19.6 - 45.3 %    Monocyte % 5.4 5.0 - 12.0 %    Eosinophil % 0.0 (L) 0.3 - 6.2 %    Basophil % 0.6 0.0 - 1.5 %    Immature Grans % 0.4 0.0 - 0.5 %    Neutrophils, Absolute 4.31 1.70 - 7.00 10*3/mm3    Lymphocytes, Absolute 0.55 (L) 0.70 - 3.10 10*3/mm3    Monocytes, Absolute 0.28 0.10 - 0.90 10*3/mm3    Eosinophils, Absolute 0.00 0.00 - 0.40 10*3/mm3    Basophils, Absolute 0.03 0.00 - 0.20 10*3/mm3    Immature Grans, Absolute 0.02 0.00 - 0.05 10*3/mm3    nRBC 0.0 0.0 - 0.2 /100 WBC   Lactic Acid, Plasma    Specimen: Blood   Result Value Ref Range    Lactate 0.8 0.5 - 2.0 mmol/L   Sedimentation Rate    Specimen: Blood   Result Value Ref Range    Sed Rate 8 0 - 20 mm/hr     CT Abdomen Pelvis With Contrast    Result Date: 4/6/2021  CT Abdomen Pelvis W INDICATION: Pain in the right side of the abdomen and pelvis. Symptoms 2-3 months. Worsening over the past 3 days. History of cholecystectomy. TECHNIQUE: CT of the abdomen and pelvis with IV contrast. Coronal and sagittal reconstructions were obtained.  Radiation dose reduction techniques included automated exposure control or exposure modulation based on body size. Count of known CT and cardiac nuc med studies performed in previous 12 months: 0. COMPARISON: None available. FINDINGS: Abdomen: Minimal atelectasis in the lung bases. No effusion. Normal caliber aorta and atherosclerotic change. Spleen is prominent but otherwise negative and adrenal glands and pancreas are unremarkable and the gallbladder is surgically absent. Mild hepatic steatosis. The kidneys are negative. There is no adenopathy. Pelvis: Negative bladder. No drainable fluid collection. Status post tubal ligation. The bowel is nonobstructed and there is a  moderate stool burden. The appendix is normal and the inguinal canals are negative. No suspicious bone lesion. Degenerative change.     1. No clearly acute process in the abdomen or pelvis. The appendix is normal. 2. Surgical absence of the gallbladder and sequela of tubal ligation. 3. Mild hepatic steatosis and prominent spleen. Signer Name: Edmar White MD  Signed: 4/6/2021 9:09 AM  Workstation Name: Patient Conversation [email or web address]  [facility]    XR Chest 1 View    Result Date: 4/6/2021  CHEST X-RAY, 04/06/2021     HISTORY: 70-year-old female in the ED complaining of one day history of fever.  TECHNIQUE: AP portable upright chest x-ray.  COMPARISON: *  Chest x-ray, 12/23/2019.  FINDINGS: The lungs are expanded and clear. No visible pulmonary infiltrate or pleural effusion. Heart size and pulmonary vascularity are within normal limits. Benign calcified pulmonary granuloma in the right upper lobe. No change since the prior exam.      1. No active disease. 2. The lungs are clear. 3. No change since 12/23/2019.  This report was finalized on 4/6/2021 9:06 AM by Dr. Ford Daley MD.        Procedures           ED Course  ED Course as of Apr 06 0959   Tue Apr 06, 2021   0730 70-year-old female complains of acute on chronic right-sided abdominal pain.  She says is fairly constant and is not associated with nausea/vomiting, nor is it associated with constipation or diarrhea.  She says that she has some urinary frequency and slight low back pain so thought maybe she has urinary tract infection.  On exam she is slightly tachycardic she is febrile she has some right-sided abdominal tenderness without guarding or rebound.  We will get labs and CT scan to rule out mass, appendicitis, pyelonephritis    [RO]   075 Patient also listing some other complaints to nurse that she did not tell me about including strange taste change and some slight knee discomfort.  I went in to talk to her about the knee discomfort she  says she had a dental procedure done last week and had prophylactic antibiotics prior to it.  She reports that yesterday her knee felt a little stiff but it feels better today than it did yesterday she is able to walk does not have any swelling or redness to the knee.  On exam she has full range of motion without apparent discomfort no warmth or swelling to the knee.  Will order ESR and CRP but based on exam I do not think she has septic arthritis.    [RO]   0802 Labs are notable for normal white blood cell count, no evidence of urinary tract infection, very slightly elevated ALT and AST, normal lactic acid, normal ESR negative Covid.  CT scan is without acute finding chest x-ray without finding as well.  Patient appears very well and is resting comfortably.  Discussed findings with patient and  with my recommendations that she be discharged and follow-up with primary care as well as with Dr. Reyes as scheduled.  they agree she can go home and follow-up with her primary care physician.  Return precautions discussed.    [RO]      ED Course User Index  [RO] Juan David Bartlett MD                                           [facility]    Final diagnoses:   Right upper quadrant abdominal pain   Right lower quadrant abdominal pain   Fever, unspecified fever cause       ED Disposition  ED Disposition     ED Disposition Condition Comment    Discharge Jim Bean MD  [address] [facility] [address] [phone]    Call   To schedule follow-up appointment         Medication List      No changes were made to your prescriptions during this visit.          Juan David Bartlett MD  04/06/21 7445

## 2021-04-06 NOTE — DISCHARGE INSTRUCTIONS
Please follow-up with your primary care physician as discussed.  Please return to the emergency room if you develop worsening symptoms or for any other concerns.

## 2021-04-06 NOTE — ED NOTES
Pt lists more complaints as the nurse spends more time in the room.  Pt reports everything having a metallic or bitter taste.  She had dental work done last week and did have prophylactic abx, but her knee prosthesis is hurting.  MD aware and more orders rec'modesto.     Karla Redman, RN  04/06/21 [id number]

## 2021-05-04 ENCOUNTER — HOSPITAL ENCOUNTER (INPATIENT)
Facility: HOSPITAL | Age: 71
End: 2021-05-04
Attending: PHYSICAL MEDICINE & REHABILITATION | Admitting: PHYSICAL MEDICINE & REHABILITATION

## 2021-06-09 ENCOUNTER — OFFICE VISIT (OUTPATIENT)
Dept: ORTHOPEDIC SURGERY | Facility: CLINIC | Age: 71
End: 2021-06-09

## 2021-06-09 VITALS — BODY MASS INDEX: 26.68 KG/M2 | HEIGHT: 62 IN | WEIGHT: 145 LBS

## 2021-06-09 DIAGNOSIS — M25.562 ACUTE PAIN OF LEFT KNEE: Primary | ICD-10-CM

## 2021-06-09 DIAGNOSIS — Z96.652 STATUS POST TOTAL LEFT KNEE REPLACEMENT: ICD-10-CM

## 2021-06-09 PROCEDURE — 99212 OFFICE O/P EST SF 10 MIN: CPT | Performed by: ORTHOPAEDIC SURGERY

## 2021-06-09 PROCEDURE — 73562 X-RAY EXAM OF KNEE 3: CPT | Performed by: ORTHOPAEDIC SURGERY

## 2021-06-09 RX ORDER — IBUPROFEN 200 MG
1 CAPSULE ORAL DAILY
COMMUNITY
Start: 2021-05-14 | End: 2021-06-13

## 2021-06-09 RX ORDER — LEVOCETIRIZINE DIHYDROCHLORIDE 5 MG/1
5 TABLET, FILM COATED ORAL
COMMUNITY

## 2021-06-09 RX ORDER — TOBRAMYCIN AND DEXAMETHASONE 3; 1 MG/ML; MG/ML
SUSPENSION/ DROPS OPHTHALMIC
COMMUNITY
Start: 2021-04-05

## 2021-06-09 RX ORDER — FLUCONAZOLE 150 MG/1
TABLET ORAL
COMMUNITY

## 2021-06-09 RX ORDER — VANCOMYCIN HYDROCHLORIDE 125 MG/1
CAPSULE ORAL
COMMUNITY
Start: 2021-05-14 | End: 2021-06-25

## 2021-06-09 RX ORDER — OXYCODONE HYDROCHLORIDE AND ACETAMINOPHEN 5; 325 MG/1; MG/1
TABLET ORAL
COMMUNITY
Start: 2021-05-14

## 2021-06-09 RX ORDER — APIXABAN 5 MG/1
TABLET, FILM COATED ORAL
COMMUNITY
Start: 2021-06-07

## 2021-06-09 NOTE — PROGRESS NOTES
Subjective:     Patient ID: Nikkie Kennedy is a 70 y.o. female.    Chief Complaint: s/p left total knee arthroplasty, DOS 4/22/2019    History of Present Illness  Nikkie Kennedy returns to clinic today for evaluation of left knee status post total knee arthroplasty. She was recently admitted to ED at [facility] for abdominal pain and was diagnosed with tick-induced ehrlichiosis. She is overall doing well at this time and has recovered well.      Social History     Occupational History   • Not on file   Tobacco Use   • Smoking status: Never Smoker   • Smokeless tobacco: Never Used   Substance and Sexual Activity   • Alcohol use: No   • Drug use: No   • Sexual activity: Not Currently     Partners: Male     Birth control/protection: Post-menopausal     Comment:       Past Medical History:   Diagnosis Date   • Abnormal vaginal Pap smear 09/10/1999    ascus   • Abnormal vaginal Pap smear 11/06/2000    lgsil   • Arthritis    • Asthma     h/o, no inhaler   • Disease of thyroid gland     hypothyroid   • DJD (degenerative joint disease) of knee     sched TKA left   • History of colposcopy 10/11/1999    chronic endocervicitis   • History of UTI    • Hole in the ear drum, right     d/t tube   • Osteoporosis    • Spinal headache      Past Surgical History:   Procedure Laterality Date   • BREAST CYST ASPIRATION Right     20 yrs ago   • BREAST CYST INCISION AND DRAINAGE  12/1996, 1997   • CHOLECYSTECTOMY  03/2016   • COLONOSCOPY     • EAR TUBES     • TOTAL KNEE ARTHROPLASTY Left 4/22/2019    Procedure: left total knee arthroplasty, possible placement of ON-Q pump, and all associated procedures;  Surgeon: Dhaval Quinn MD;  Location: [facility];  Service: Orthopedics   • TUBAL ABDOMINAL LIGATION     • VARICOSE VEIN SURGERY  2015    Pt states she had varicose veins removed on both legs (2-3 years ago).       Family History   Problem Relation Age of Onset   • Cancer Mother    • Colon cancer Mother 76   • Clotting  "disorder Other         does not know name   • Deep vein thrombosis Other    • Breast cancer Neg Hx    • Ovarian cancer Neg Hx    • Malig Hyperthermia Neg Hx          Review of Systems        Objective:  Vitals:    06/09/21 0905   Weight: 65.8 kg (145 lb)   Height: 157.5 cm (62\")         06/09/21 0905   Weight: 65.8 kg (145 lb)     Body mass index is 26.52 kg/m².  General: No acute distress.  Resp: normal respiratory effort  Skin: no rashes or wounds; normal turgor  Psych: mood and affect appropriate; recent and remote memory intact          Ortho Exam     Left Knee-    ROM 0-130 degrees  4+/5 on flexion  4+/5 on extension  No tenderness  Incision well-healed    Effusion- minimal   Stable opening on varus and valgus stress at 0 and 30    Positive sensation light tough all distributions symmetric to contralateral side  Brisk cap refill all digits  1+ dorsalis pedis pulse          Imaging:  Left Knee X-Ray  Indication: s/p total knee     AP, Lateral, and Grapeview views    Findings:  Total knee arthroplasty components are in stable position with acceptable overall alignment, no evidence of periprosthetic fracture, loosening, osteolysis, or reactive bone formation.    Compared to prior postoperative x-rays    Assessment:        1. Acute pain of left knee    2. Status post total left knee replacement           Plan:          1. Discussed treatment options at length with patient at today's visit including continued activity management.  2. Follow-up as needed.      Nikkie Kennedy and  were in agreement with plan and had all questions answered.     Orders:  Orders Placed This Encounter   Procedures   • XR Knee 3 View Left       Medications:  No orders of the defined types were placed in this encounter.      Followup:  No follow-ups on file.    Diagnoses and all orders for this visit:    1. Acute pain of left knee (Primary)  -     XR Knee 3 View Left    2. Status post total left knee replacement        SCRIBE " ATTESTATION:  I, Alfonso Moran, attest that all medical record entries for this patient were documented by me acting as a medical scribe for Dhaval Quinn MD.    PROVIDER ATTESTATION:  I, Dhaval Quinn MD, personally performed the services described in this documentation. All medical record entries made by the scribe were at my direction and in my presence. I have reviewed the chart and discharge instructions and agree that the record reflects my personal performance and is accurate and complete.  Dhaval Quinn MD.    Electronically signed: Dhaval Quinn MD 6/9/2021 09:37 EDT       Dictated utilizing Dragon dictation

## 2022-02-02 RX ORDER — MELOXICAM 7.5 MG/1
7.5 TABLET ORAL DAILY
Qty: 90 TABLET | Refills: 2 | Status: SHIPPED | OUTPATIENT
Start: 2022-02-02

## 2022-02-02 NOTE — TELEPHONE ENCOUNTER
Rx Refill Note  Requested Prescriptions     Pending Prescriptions Disp Refills    meloxicam (MOBIC) 7.5 MG tablet [Pharmacy Med Name: MELOXICAM 7.5MG] 90 tablet 2     Sig: Take 1 tablet by mouth Daily.      Last office visit with prescribing clinician: 6/9/2021      Next office visit with prescribing clinician: Visit date not found   Last Filled:12.22.2020    No diagnosis found.  s/p left total knee arthroplasty, DOS 4/22/2019  Date of Surgery:04.22.2019  [unfilled]    Denisha Romero MA  02/02/22, 10:16 EST    {TIP  Encounters:    {TIP  Please add Last Relevant Lab Date if appropriate:  {TIP  Is Refill Pharmacy correct?:

## 2022-02-17 ENCOUNTER — OFFICE VISIT (OUTPATIENT)
Dept: OBSTETRICS AND GYNECOLOGY | Facility: CLINIC | Age: 72
End: 2022-02-17

## 2022-02-17 VITALS
BODY MASS INDEX: 22.89 KG/M2 | HEIGHT: 62 IN | WEIGHT: 124.4 LBS | SYSTOLIC BLOOD PRESSURE: 108 MMHG | DIASTOLIC BLOOD PRESSURE: 64 MMHG

## 2022-02-17 DIAGNOSIS — N95.2 VAGINAL ATROPHY: ICD-10-CM

## 2022-02-17 DIAGNOSIS — Z12.31 ENCOUNTER FOR SCREENING MAMMOGRAM FOR MALIGNANT NEOPLASM OF BREAST: Primary | ICD-10-CM

## 2022-02-17 DIAGNOSIS — M81.0 OSTEOPOROSIS, UNSPECIFIED OSTEOPOROSIS TYPE, UNSPECIFIED PATHOLOGICAL FRACTURE PRESENCE: ICD-10-CM

## 2022-02-17 DIAGNOSIS — R10.31 RLQ ABDOMINAL PAIN: ICD-10-CM

## 2022-02-17 DIAGNOSIS — Z13.89 SCREENING FOR GENITOURINARY CONDITION: ICD-10-CM

## 2022-02-17 LAB
BILIRUB BLD-MCNC: NEGATIVE MG/DL
CLARITY, POC: CLEAR
COLOR UR: YELLOW
GLUCOSE UR STRIP-MCNC: NEGATIVE MG/DL
KETONES UR QL: NEGATIVE
LEUKOCYTE EST, POC: NEGATIVE
NITRITE UR-MCNC: NEGATIVE MG/ML
PH UR: 5 [PH] (ref 5–8)
PROT UR STRIP-MCNC: NEGATIVE MG/DL
RBC # UR STRIP: NEGATIVE /UL
SP GR UR: 1.03 (ref 1–1.03)
UROBILINOGEN UR QL: NORMAL

## 2022-02-17 PROCEDURE — 99213 OFFICE O/P EST LOW 20 MIN: CPT | Performed by: OBSTETRICS & GYNECOLOGY

## 2022-02-17 PROCEDURE — 81002 URINALYSIS NONAUTO W/O SCOPE: CPT | Performed by: OBSTETRICS & GYNECOLOGY

## 2022-02-17 RX ORDER — PROMETHAZINE HYDROCHLORIDE 25 MG/1
SUPPOSITORY RECTAL
COMMUNITY

## 2022-02-17 RX ORDER — VANCOMYCIN HYDROCHLORIDE 125 MG/1
CAPSULE ORAL
COMMUNITY

## 2022-02-17 RX ORDER — LEVOTHYROXINE SODIUM 0.07 MG/1
TABLET ORAL
COMMUNITY

## 2022-02-17 RX ORDER — LEVOTHYROXINE SODIUM 0.07 MG/1
TABLET ORAL
COMMUNITY
Start: 2021-12-20

## 2022-02-17 RX ORDER — AZITHROMYCIN 500 MG/1
TABLET, FILM COATED ORAL
COMMUNITY

## 2022-02-17 RX ORDER — PREDNISONE 50 MG/1
TABLET ORAL
COMMUNITY

## 2022-02-17 RX ORDER — ESTRADIOL 0.1 MG/G
1 CREAM VAGINAL 2 TIMES WEEKLY
Qty: 42.5 G | Refills: 2 | Status: SHIPPED | OUTPATIENT
Start: 2022-02-17

## 2022-02-17 NOTE — PROGRESS NOTES
"      Nikkie Kennedy is a 71 y.o. patient who presents for follow up of   Chief Complaint   Patient presents with   • Follow-up     US       70 yo est pt here for TVUS. She was last seen in 2021 and was c/o RLQ pain at that time. She was advised to get an US at that time but did not come in until now. She says her RLQ pain has had resolved. She got a tick bite and was on the vent and almost . Her US today shows a 5.5 cm uterus, EL 0.08 cm and normal ovaries. There is no comparable data. She needs to have a refill of her Estrace cream. She ha osteoporosis      The following portions of the patient's history were reviewed and updated as appropriate: allergies, current medications and problem list.    Review of Systems   Constitutional: Positive for activity change.   Gastrointestinal: Negative for abdominal pain.   Genitourinary: Negative for decreased urine volume, pelvic pain, vaginal bleeding, vaginal discharge and vaginal pain.       /64   Ht 157.5 cm (62.01\")   Wt 56.4 kg (124 lb 6.4 oz)   LMP  (Exact Date)   Breastfeeding No   BMI 22.75 kg/m²     Physical Exam  Vitals and nursing note reviewed.   Constitutional:       Appearance: Normal appearance. She is well-developed.   HENT:      Head: Normocephalic and atraumatic.   Eyes:      General: No scleral icterus.     Conjunctiva/sclera: Conjunctivae normal.   Neck:      Thyroid: No thyromegaly.   Abdominal:      General: There is no distension.      Palpations: Abdomen is soft. There is no mass.      Tenderness: There is no abdominal tenderness. There is no guarding or rebound.      Hernia: No hernia is present.   Skin:     General: Skin is warm and dry.   Neurological:      Mental Status: She is alert and oriented to person, place, and time.   Psychiatric:         Mood and Affect: Mood normal.         Behavior: Behavior normal.         Thought Content: Thought content normal.         Judgment: Judgment normal.         A/P:  1. RLQ pain- resolved. " Normal pelvic US.  2. Vaginal atrophy- Rx Estrace x 2/week.Patient counseled that vaginal estrogen rings, creams and tablets are available and highly effective at treating local vaginal symptoms such as atrophy and vaginal dryness.  Vaginal estrogen does not cause uterine overgrowth and does not require a progestogen to protect the uterus.  Very small amounts of estrogen are absorbed systemically.  For patients with a history of an estrogen dependent cancer such as breast cancer, the decision to use local estrogen for local vaginal symptoms should be made after consultation with her oncologist.  Possible side effects include local irritation or burning and/or vaginal bleeding and should always be reported.    3. Osteoporosis- pt declines all meds for BMD  4. MMG UTD 3/2021 B1. Schedule MMG 3/2022  5. RHM- RTO 3/2023 annual or prn.    Assessment/Plan   Diagnoses and all orders for this visit:    1. Screening for genitourinary condition (Primary)  -     POC Urinalysis Dipstick    2. Encounter for screening mammogram for malignant neoplasm of breast  -     Mammo Screening Bilateral With CAD; Future    3. Vaginal atrophy    4. RLQ abdominal pain    5. Osteoporosis, unspecified osteoporosis type, unspecified pathological fracture presence    Other orders  -     estradiol (ESTRACE) 0.1 MG/GM vaginal cream; Insert 1 g into the vagina 2 (Two) Times a Week.  Dispense: 42.5 g; Refill: 2                 No follow-ups on file.      Jazzmine Reyes MD    2/17/2022  18:34 EST

## 2022-03-24 ENCOUNTER — HOSPITAL ENCOUNTER (OUTPATIENT)
Dept: MAMMOGRAPHY | Facility: HOSPITAL | Age: 72
Discharge: HOME | End: 2022-03-24
Admitting: OBSTETRICS & GYNECOLOGY

## 2022-03-24 DIAGNOSIS — Z12.31 ENCOUNTER FOR SCREENING MAMMOGRAM FOR MALIGNANT NEOPLASM OF BREAST: ICD-10-CM

## 2022-03-24 PROCEDURE — 77067 SCR MAMMO BI INCL CAD: CPT

## 2022-03-24 PROCEDURE — 77063 BREAST TOMOSYNTHESIS BI: CPT

## 2022-08-22 ENCOUNTER — LAB (OUTPATIENT)
Dept: LAB | Facility: HOSPITAL | Age: 72
End: 2022-08-22

## 2022-08-22 ENCOUNTER — TRANSCRIBE ORDERS (OUTPATIENT)
Dept: ADMINISTRATIVE | Facility: HOSPITAL | Age: 72
End: 2022-08-22

## 2022-08-22 ENCOUNTER — HOSPITAL ENCOUNTER (OUTPATIENT)
Dept: GENERAL RADIOLOGY | Facility: HOSPITAL | Age: 72
Discharge: HOME | End: 2022-08-22

## 2022-08-22 ENCOUNTER — HOSPITAL ENCOUNTER (OUTPATIENT)
Dept: CARDIOLOGY | Facility: HOSPITAL | Age: 72
Setting detail: HOSPITAL OUTPATIENT SURGERY
Discharge: HOME | End: 2022-08-22

## 2022-08-22 DIAGNOSIS — M79.672 LEFT FOOT PAIN: ICD-10-CM

## 2022-08-22 DIAGNOSIS — M20.42 HAMMER TOE OF LEFT FOOT: ICD-10-CM

## 2022-08-22 DIAGNOSIS — M20.12 ACQUIRED HALLUX VALGUS OF LEFT FOOT: Primary | ICD-10-CM

## 2022-08-22 DIAGNOSIS — M20.12 ACQUIRED HALLUX VALGUS OF LEFT FOOT: ICD-10-CM

## 2022-08-22 LAB
ALBUMIN SERPL-MCNC: 4.6 G/DL (ref 3.5–5.2)
ALBUMIN/GLOB SERPL: 1.8 G/DL
ALP SERPL-CCNC: 105 U/L (ref 39–117)
ALT SERPL W P-5'-P-CCNC: 20 U/L (ref 1–33)
ANION GAP SERPL CALCULATED.3IONS-SCNC: 11.1 MMOL/L (ref 5–15)
AST SERPL-CCNC: 23 U/L (ref 1–32)
BASOPHILS # BLD AUTO: 0.05 10*3/MM3 (ref 0–0.2)
BASOPHILS NFR BLD AUTO: 0.6 % (ref 0–1.5)
BILIRUB SERPL-MCNC: 0.3 MG/DL (ref 0–1.2)
BUN SERPL-MCNC: 21 MG/DL (ref 8–23)
BUN/CREAT SERPL: 25.3 (ref 7–25)
CALCIUM SPEC-SCNC: 10.4 MG/DL (ref 8.6–10.5)
CHLORIDE SERPL-SCNC: 105 MMOL/L (ref 98–107)
CO2 SERPL-SCNC: 25.9 MMOL/L (ref 22–29)
CREAT SERPL-MCNC: 0.83 MG/DL (ref 0.57–1)
DEPRECATED RDW RBC AUTO: 40.4 FL (ref 37–54)
EGFRCR SERPLBLD CKD-EPI 2021: 75 ML/MIN/1.73
EOSINOPHIL # BLD AUTO: 0.13 10*3/MM3 (ref 0–0.4)
EOSINOPHIL NFR BLD AUTO: 1.5 % (ref 0.3–6.2)
ERYTHROCYTE [DISTWIDTH] IN BLOOD BY AUTOMATED COUNT: 12.7 % (ref 12.3–15.4)
GLOBULIN UR ELPH-MCNC: 2.6 GM/DL
GLUCOSE SERPL-MCNC: 90 MG/DL (ref 65–99)
HCT VFR BLD AUTO: 41.9 % (ref 34–46.6)
HGB BLD-MCNC: 13.8 G/DL (ref 12–15.9)
IMM GRANULOCYTES # BLD AUTO: 0.04 10*3/MM3 (ref 0–0.05)
IMM GRANULOCYTES NFR BLD AUTO: 0.5 % (ref 0–0.5)
LYMPHOCYTES # BLD AUTO: 3.39 10*3/MM3 (ref 0.7–3.1)
LYMPHOCYTES NFR BLD AUTO: 38.6 % (ref 19.6–45.3)
MCH RBC QN AUTO: 28.9 PG (ref 26.6–33)
MCHC RBC AUTO-ENTMCNC: 32.9 G/DL (ref 31.5–35.7)
MCV RBC AUTO: 87.7 FL (ref 79–97)
MONOCYTES # BLD AUTO: 0.7 10*3/MM3 (ref 0.1–0.9)
MONOCYTES NFR BLD AUTO: 8 % (ref 5–12)
NEUTROPHILS NFR BLD AUTO: 4.48 10*3/MM3 (ref 1.7–7)
NEUTROPHILS NFR BLD AUTO: 50.8 % (ref 42.7–76)
NRBC BLD AUTO-RTO: 0 /100 WBC (ref 0–0.2)
PLATELET # BLD AUTO: 254 10*3/MM3 (ref 140–450)
PMV BLD AUTO: 11.7 FL (ref 6–12)
POTASSIUM SERPL-SCNC: 4.4 MMOL/L (ref 3.5–5.2)
PROT SERPL-MCNC: 7.2 G/DL (ref 6–8.5)
RBC # BLD AUTO: 4.78 10*6/MM3 (ref 3.77–5.28)
SODIUM SERPL-SCNC: 142 MMOL/L (ref 136–145)
WBC NRBC COR # BLD: 8.79 10*3/MM3 (ref 3.4–10.8)

## 2022-08-22 PROCEDURE — 36415 COLL VENOUS BLD VENIPUNCTURE: CPT

## 2022-08-22 PROCEDURE — 93010 ELECTROCARDIOGRAM REPORT: CPT | Performed by: INTERNAL MEDICINE

## 2022-08-22 PROCEDURE — 71046 X-RAY EXAM CHEST 2 VIEWS: CPT

## 2022-08-22 PROCEDURE — 80053 COMPREHEN METABOLIC PANEL: CPT

## 2022-08-22 PROCEDURE — 93005 ELECTROCARDIOGRAM TRACING: CPT | Performed by: PODIATRIST

## 2022-08-22 PROCEDURE — 85025 COMPLETE CBC W/AUTO DIFF WBC: CPT

## 2022-08-23 LAB — QT INTERVAL: 361 MS

## 2023-04-19 NOTE — TELEPHONE ENCOUNTER
Rx Refill Note  Requested Prescriptions     Pending Prescriptions Disp Refills   • meloxicam (MOBIC) 7.5 MG tablet [Pharmacy Med Name: MELOXICAM 7.5MG] 90 tablet 1     Sig: TAKE 1 TABLET BY MOUTH DAILY.      Last office visit with prescribing clinician: 6/9/2021      Next office visit with prescribing clinician: Visit date not found   Last Filled:02.02.2022    Medication list updated verbally with patient.    DX:  s/p left total knee arthroplasty, DOS 4/22/2019    Denisha Romero MA  04/19/23, 09:07 EDT    {TIP  Encounters:    {TIP  Please add Last Relevant Lab Date if appropriate:  {TIP  Is Refill Pharmacy correct?:

## 2023-04-20 RX ORDER — MELOXICAM 7.5 MG/1
7.5 TABLET ORAL DAILY
Qty: 90 TABLET | Refills: 1 | Status: SHIPPED | OUTPATIENT
Start: 2023-04-20

## 2023-07-24 PROBLEM — Z12.11 ENCOUNTER FOR SCREENING FOR MALIGNANT NEOPLASM OF COLON: Status: ACTIVE | Noted: 2023-07-24

## 2023-07-26 ENCOUNTER — ANESTHESIA EVENT (OUTPATIENT)
Dept: PERIOP | Facility: HOSPITAL | Age: 73
End: 2023-07-26
Payer: MEDICARE

## 2023-07-27 ENCOUNTER — ANESTHESIA (OUTPATIENT)
Dept: PERIOP | Facility: HOSPITAL | Age: 73
End: 2023-07-27
Payer: MEDICARE

## 2023-07-27 ENCOUNTER — HOSPITAL ENCOUNTER (OUTPATIENT)
Facility: HOSPITAL | Age: 73
Setting detail: HOSPITAL OUTPATIENT SURGERY
Discharge: HOME | End: 2023-07-27
Attending: STUDENT IN AN ORGANIZED HEALTH CARE EDUCATION/TRAINING PROGRAM | Admitting: STUDENT IN AN ORGANIZED HEALTH CARE EDUCATION/TRAINING PROGRAM
Payer: MEDICARE

## 2023-07-27 VITALS
DIASTOLIC BLOOD PRESSURE: 65 MMHG | HEIGHT: 62 IN | WEIGHT: 123.4 LBS | RESPIRATION RATE: 14 BRPM | TEMPERATURE: 97.6 F | OXYGEN SATURATION: 100 % | BODY MASS INDEX: 22.71 KG/M2 | HEART RATE: 69 BPM | SYSTOLIC BLOOD PRESSURE: 106 MMHG

## 2023-07-27 DIAGNOSIS — Z12.11 ENCOUNTER FOR SCREENING FOR MALIGNANT NEOPLASM OF COLON: ICD-10-CM

## 2023-07-27 PROCEDURE — G0105 COLORECTAL SCRN; HI RISK IND: HCPCS | Performed by: STUDENT IN AN ORGANIZED HEALTH CARE EDUCATION/TRAINING PROGRAM

## 2023-07-27 PROCEDURE — 25010000002 PROPOFOL 200 MG/20ML EMULSION: Performed by: NURSE ANESTHETIST, CERTIFIED REGISTERED

## 2023-07-27 RX ORDER — SODIUM CHLORIDE 0.9 % (FLUSH) 0.9 %
10 SYRINGE (ML) INJECTION AS NEEDED
Status: DISCONTINUED | OUTPATIENT
Start: 2023-07-27 | End: 2023-07-27 | Stop reason: HOSPADM

## 2023-07-27 RX ORDER — DIPHENHYDRAMINE HYDROCHLORIDE 50 MG/ML
12.5 INJECTION INTRAMUSCULAR; INTRAVENOUS
Status: DISCONTINUED | OUTPATIENT
Start: 2023-07-27 | End: 2023-07-27 | Stop reason: HOSPADM

## 2023-07-27 RX ORDER — SODIUM CHLORIDE 0.9 % (FLUSH) 0.9 %
10 SYRINGE (ML) INJECTION EVERY 12 HOURS SCHEDULED
Status: DISCONTINUED | OUTPATIENT
Start: 2023-07-27 | End: 2023-07-27 | Stop reason: HOSPADM

## 2023-07-27 RX ORDER — SODIUM CHLORIDE, SODIUM LACTATE, POTASSIUM CHLORIDE, CALCIUM CHLORIDE 600; 310; 30; 20 MG/100ML; MG/100ML; MG/100ML; MG/100ML
100 INJECTION, SOLUTION INTRAVENOUS CONTINUOUS
Status: DISCONTINUED | OUTPATIENT
Start: 2023-07-27 | End: 2023-07-27 | Stop reason: HOSPADM

## 2023-07-27 RX ORDER — LIDOCAINE HYDROCHLORIDE 20 MG/ML
INJECTION, SOLUTION INFILTRATION; PERINEURAL AS NEEDED
Status: DISCONTINUED | OUTPATIENT
Start: 2023-07-27 | End: 2023-07-27 | Stop reason: SURG

## 2023-07-27 RX ORDER — GLYCOPYRROLATE 0.2 MG/ML
INJECTION INTRAMUSCULAR; INTRAVENOUS AS NEEDED
Status: DISCONTINUED | OUTPATIENT
Start: 2023-07-27 | End: 2023-07-27 | Stop reason: SURG

## 2023-07-27 RX ORDER — SODIUM CHLORIDE 9 MG/ML
40 INJECTION, SOLUTION INTRAVENOUS AS NEEDED
Status: DISCONTINUED | OUTPATIENT
Start: 2023-07-27 | End: 2023-07-27 | Stop reason: HOSPADM

## 2023-07-27 RX ORDER — SODIUM CHLORIDE, SODIUM LACTATE, POTASSIUM CHLORIDE, CALCIUM CHLORIDE 600; 310; 30; 20 MG/100ML; MG/100ML; MG/100ML; MG/100ML
9 INJECTION, SOLUTION INTRAVENOUS CONTINUOUS PRN
Status: DISCONTINUED | OUTPATIENT
Start: 2023-07-27 | End: 2023-07-27 | Stop reason: HOSPADM

## 2023-07-27 RX ORDER — ONDANSETRON 2 MG/ML
4 INJECTION INTRAMUSCULAR; INTRAVENOUS ONCE AS NEEDED
Status: DISCONTINUED | OUTPATIENT
Start: 2023-07-27 | End: 2023-07-27 | Stop reason: HOSPADM

## 2023-07-27 RX ORDER — MAGNESIUM HYDROXIDE 1200 MG/15ML
LIQUID ORAL AS NEEDED
Status: DISCONTINUED | OUTPATIENT
Start: 2023-07-27 | End: 2023-07-27 | Stop reason: HOSPADM

## 2023-07-27 RX ORDER — PROPOFOL 10 MG/ML
INJECTION, EMULSION INTRAVENOUS AS NEEDED
Status: DISCONTINUED | OUTPATIENT
Start: 2023-07-27 | End: 2023-07-27 | Stop reason: SURG

## 2023-07-27 RX ADMIN — PROPOFOL 100 MG: 10 INJECTION, EMULSION INTRAVENOUS at 08:20

## 2023-07-27 RX ADMIN — PROPOFOL 50 MG: 10 INJECTION, EMULSION INTRAVENOUS at 08:30

## 2023-07-27 RX ADMIN — PROPOFOL 50 MG: 10 INJECTION, EMULSION INTRAVENOUS at 08:15

## 2023-07-27 RX ADMIN — LIDOCAINE HYDROCHLORIDE 80 MG: 20 INJECTION, SOLUTION INFILTRATION; PERINEURAL at 08:12

## 2023-07-27 RX ADMIN — PROPOFOL 50 MG: 10 INJECTION, EMULSION INTRAVENOUS at 08:25

## 2023-07-27 RX ADMIN — PROPOFOL 50 MG: 10 INJECTION, EMULSION INTRAVENOUS at 08:12

## 2023-07-27 RX ADMIN — SODIUM CHLORIDE, POTASSIUM CHLORIDE, SODIUM LACTATE AND CALCIUM CHLORIDE 9 ML/HR: 600; 310; 30; 20 INJECTION, SOLUTION INTRAVENOUS at 07:36

## 2023-07-27 RX ADMIN — GLYCOPYRROLATE 0.2 MG: 0.2 INJECTION INTRAMUSCULAR; INTRAVENOUS at 08:19

## 2023-07-27 NOTE — H&P
Patient Care Team:  Jim Michael MD as PCP - General  Jim Michael MD as PCP - Family Medicine    CHIEF COMPLAINT: Family hx of CRC or polyps    HISTORY OF PRESENT ILLNESS:  Family h/o CRC -- mother likely had CRC but no formal diagnosis    Past Medical History:   Diagnosis Date    Abnormal vaginal Pap smear 09/10/1999    ascus    Abnormal vaginal Pap smear 11/06/2000    lgsil    Arthritis     Asthma     h/o, no inhaler    Disease of thyroid gland     hypothyroid    DJD (degenerative joint disease) of knee     sched TKA left    History of colposcopy 10/11/1999    chronic endocervicitis    History of UTI     Hole in the ear drum, right     d/t tube    Osteoporosis     Spinal headache      Past Surgical History:   Procedure Laterality Date    BREAST CYST ASPIRATION Right     20 yrs ago    BREAST CYST INCISION AND DRAINAGE  12/1996, 1997    CHOLECYSTECTOMY  03/2016    COLONOSCOPY      EAR TUBES      TOTAL KNEE ARTHROPLASTY Left 04/22/2019    Procedure: left total knee arthroplasty, possible placement of ON-Q pump, and all associated procedures;  Surgeon: Dhaval Quinn MD;  Location: [facility];  Service: Orthopedics    TUBAL ABDOMINAL LIGATION      VARICOSE VEIN SURGERY  2015    Pt states she had varicose veins removed on both legs (2-3 years ago).     Family History   Problem Relation Age of Onset    Cancer Mother     Colon cancer Mother 76    Clotting disorder Other         does not know name    Deep vein thrombosis Other     Breast cancer Neg Hx     Ovarian cancer Neg Hx     Malig Hyperthermia Neg Hx      Social History     Tobacco Use    Smoking status: Never    Smokeless tobacco: Never   Substance Use Topics    Alcohol use: No    Drug use: No     Medications Prior to Admission   Medication Sig Dispense Refill Last Dose    estradiol (ESTRACE) 0.1 MG/GM vaginal cream Insert 1 g into the vagina 2 (Two) Times a Week. 42.5 g 2 Past Month    levothyroxine (SYNTHROID, LEVOTHROID) 75 MCG tablet Take 1  "tablet by mouth Daily.   7/26/2023    tobramycin-dexamethasone (TOBRADEX) 0.3-0.1 % ophthalmic suspension    Past Month    ALLERGY SERUM INJECTION Inject  under the skin into the appropriate area as directed Every 7 (Seven) Days.   7/12/2023    EPINEPHrine (EPIPEN) 0.3 MG/0.3ML solution auto-injector injection    Unknown    levocetirizine (XYZAL) 5 MG tablet Take 1 tablet by mouth.   More than a month    meloxicam (MOBIC) 7.5 MG tablet TAKE 1 TABLET BY MOUTH DAILY. 90 tablet 1 More than a month     Allergies:  Hydrocodone, Cefepime, Codeine, Omnicef [cefdinir], and Oxycodone    REVIEW OF SYSTEMS:  Please see the above history of present illness for pertinent positives and negatives.  The remainder of the patient's systems have been reviewed and are negative.     Vital Signs  Temp:  [97.9 °F (36.6 °C)] 97.9 °F (36.6 °C)  Heart Rate:  [67] 67  Resp:  [12] 12  BP: (112)/(65) 112/65    Flowsheet Rows      Flowsheet Row First Filed Value   Admission Height 157.5 cm (62\") Documented at 07/26/2023 1728   Admission Weight 55.3 kg (122 lb) Documented at 07/26/2023 1728             Physical Exam:  Physical Exam   Constitutional: Patient appears well-developed and well-nourished and in no acute distress   HEENT:   Head: Normocephalic and atraumatic.   Eyes:  Pupils are equal, round, and reactive to light. EOM are intact. Sclerae are anicteric and non-injected.  Mouth and Throat: Patient has moist mucous membranes. Oropharynx is clear of any erythema or exudate.     Neck: Neck supple. No JVD present. No thyromegaly present. No lymphadenopathy present.  Cardiovascular: Regular rate, regular rhythm, S1 normal and S2 normal.  Exam reveals no gallop and no friction rub.  No murmur heard.  Pulmonary/Chest: Lungs are clear to auscultation bilaterally. No respiratory distress. No wheezes. No rhonchi. No rales.   Abdominal: Soft. Bowel sounds are normal. No distension and no mass. There is no hepatosplenomegaly. There is no tenderness. "   Musculoskeletal: Normal Muscle tone  Extremities: No edema. Pulses are palpable in all 4 extremities.  Neurological: Patient is alert and oriented to person, place, and time. Cranial nerves II-XII are grossly intact with no focal deficits.  Skin: Skin is warm. No rash noted. Nails show no clubbing.  No cyanosis or erythema.    Debilities/Disabilities Identified: None  Emotional Behavior: Appropriate     Results Review:   I reviewed the patient's new clinical results.    Lab Results (most recent)       None            Imaging Results (Most Recent)       None          reviewed    ECG/EMG Results (most recent)       None          reviewed    Assessment & Plan   Family hx of CRC or polyps/  colonoscopy      I discussed the patient's findings and my recommendations with patient.     Evangelista Brower MD  07/27/23  08:08 EDT    Time: 10 min prior to procedure.

## 2023-07-27 NOTE — BRIEF OP NOTE
COLONOSCOPY  Progress Note    Nikkie Kennedy  7/27/2023    Pre-op Diagnosis:   Encounter for screening for malignant neoplasm of colon [Z12.11]       Post-Op Diagnosis Codes:     * Encounter for screening for malignant neoplasm of colon [Z12.11]    Procedure/CPT® Codes:        Procedure(s):  COLONOSCOPY              Surgeon(s):  Evangelista Brower MD    Anesthesia: Monitored Anesthesia Care    Staff:   Circulator: Karrie Kumar RN  Scrub Person: Michaelle Ochoa         Estimated Blood Loss: none    Urine Voided: * No values recorded between 7/27/2023  8:09 AM and 7/27/2023  8:30 AM *    Specimens:                None          Drains: * No LDAs found *    Findings:   - Mild diverticulosis throughout the colon.   - Small external hemorrhoids seen with retroflexion.         Complications: None           Evangelista Brower MD     Date: 7/27/2023  Time: 08:33 EDT

## 2023-07-27 NOTE — ANESTHESIA POSTPROCEDURE EVALUATION
Patient: Nikkie Kennedy    Procedure Summary       Date: 07/27/23 Room / Location: [facility] ENDOSCOPY 1 /  [facility] OR    Anesthesia Start: 0809 Anesthesia Stop: 0833    Procedure: COLONOSCOPY Diagnosis:       Encounter for screening for malignant neoplasm of colon      (Encounter for screening for malignant neoplasm of colon [Z12.11])    Surgeons: Evangelista Brower MD Provider: Alex Parisi CRNA    Anesthesia Type: MAC ASA Status: 2            Anesthesia Type: MAC    Vitals  Vitals Value Taken Time   /65 07/27/23 0910   Temp 97.6 °F (36.4 °C) 07/27/23 0837   Pulse 65 07/27/23 0913   Resp 12 07/27/23 0850   SpO2 95 % 07/27/23 0913   Vitals shown include unvalidated device data.        Post Anesthesia Care and Evaluation    Patient location during evaluation: PHASE II  Patient participation: complete - patient participated  Level of consciousness: awake  Pain management: adequate    Airway patency: patent  Anesthetic complications: No anesthetic complications  PONV Status: none  Cardiovascular status: acceptable  Respiratory status: acceptable  Hydration status: acceptable

## 2023-08-14 ENCOUNTER — OFFICE VISIT (OUTPATIENT)
Dept: OBSTETRICS AND GYNECOLOGY | Facility: CLINIC | Age: 73
End: 2023-08-14
Payer: MEDICARE

## 2023-08-14 VITALS
WEIGHT: 126.6 LBS | SYSTOLIC BLOOD PRESSURE: 108 MMHG | HEIGHT: 62 IN | BODY MASS INDEX: 23.3 KG/M2 | DIASTOLIC BLOOD PRESSURE: 64 MMHG

## 2023-08-14 DIAGNOSIS — N95.2 VAGINAL ATROPHY: ICD-10-CM

## 2023-08-14 DIAGNOSIS — Z12.31 ENCOUNTER FOR SCREENING MAMMOGRAM FOR MALIGNANT NEOPLASM OF BREAST: ICD-10-CM

## 2023-08-14 DIAGNOSIS — Z01.419 PAP SMEAR, LOW-RISK: Primary | ICD-10-CM

## 2023-08-14 DIAGNOSIS — Z01.419 ROUTINE GYNECOLOGICAL EXAMINATION: ICD-10-CM

## 2023-08-14 RX ORDER — PROMETHAZINE HYDROCHLORIDE 25 MG/1
SUPPOSITORY RECTAL
COMMUNITY

## 2023-08-14 RX ORDER — ESTRADIOL 0.1 MG/G
1 CREAM VAGINAL 2 TIMES WEEKLY
Qty: 42.5 G | Refills: 2 | Status: SHIPPED | OUTPATIENT
Start: 2023-08-14

## 2023-08-14 RX ORDER — FLUTICASONE PROPIONATE 50 MCG
SPRAY, SUSPENSION (ML) NASAL
COMMUNITY
Start: 2023-07-21

## 2023-08-14 RX ORDER — CHLORHEXIDINE GLUCONATE 0.12 MG/ML
RINSE ORAL
COMMUNITY

## 2023-08-14 RX ORDER — VANCOMYCIN HYDROCHLORIDE 125 MG/1
CAPSULE ORAL
COMMUNITY

## 2023-08-14 RX ORDER — PREDNISONE 50 MG/1
TABLET ORAL
COMMUNITY

## 2023-08-14 NOTE — PROGRESS NOTES
GYN Annual Exam     CC- Here for annual exam.     Nikkie Kennedy is a 72 y.o. female est who presents for annual well woman exam. She underwent menopause in her mid 50s and took HRT x 10 years, none now.No  VB.  She needs a refill of her Estrace cream. She has decided against treatment for osteoporosis .  She is due for her MMG at any time.       OB History          2    Para   2    Term   0            AB        Living   2         SAB        IAB        Ectopic        Molar        Multiple        Live Births              Obstetric Comments   2                Menarche:9  Menopause:mid 50s  HRT: x 10 years, none now  Current contraception: post menopausal status and tubal ligation  History of abnormal Pap smear: no  History of abnormal mammogram: yes - breast cyst drained  Family history of uterine, colon or ovarian cancer: yes - mom colon age 76  Family history of breast cancer: no  STD's: none  Last pap: 2021- nl pap  SHASHI: family h/o clotting disorder, pt does not know name    Health Maintenance   Topic Date Due    TDAP/TD VACCINES (1 - Tdap) Never done    ZOSTER VACCINE (1 of 2) Never done    Pneumococcal Vaccine 65+ (1 - PCV) Never done    ANNUAL WELLNESS VISIT  Never done    COVID-19 Vaccine (6 - Moderna series) 2022    DXA SCAN  2023    INFLUENZA VACCINE  10/01/2023    MAMMOGRAM  2024    COLORECTAL CANCER SCREENING  2033    HEPATITIS C SCREENING  Completed       Past Medical History:   Diagnosis Date    Abnormal vaginal Pap smear 09/10/1999    ascus    Abnormal vaginal Pap smear 2000    lgsil    Arthritis     Asthma     h/o, no inhaler    Disease of thyroid gland     hypothyroid    DJD (degenerative joint disease) of knee     sched TKA left    History of colposcopy 10/11/1999    chronic endocervicitis    History of UTI     Hole in the ear drum, right     d/t tube    Osteoporosis     Spinal headache        Past Surgical History:   Procedure Laterality Date     BREAST CYST ASPIRATION Right     20 yrs ago    BREAST CYST INCISION AND DRAINAGE  12/1996, 1997    CHOLECYSTECTOMY  03/2016    COLONOSCOPY      COLONOSCOPY N/A 7/27/2023    Procedure: COLONOSCOPY;  Surgeon: Evangelista Brower MD;  Location: [facility] OR;  Service: Gastroenterology;  Laterality: N/A;  Diverticulosis; External hemorrhoids    EAR TUBES      TOTAL KNEE ARTHROPLASTY Left 04/22/2019    Procedure: left total knee arthroplasty, possible placement of ON-Q pump, and all associated procedures;  Surgeon: Dhaval Quinn MD;  Location: [facility] OR;  Service: Orthopedics    TUBAL ABDOMINAL LIGATION      VARICOSE VEIN SURGERY  2015    Pt states she had varicose veins removed on both legs (2-3 years ago).         Current Outpatient Medications:     ALLERGY SERUM INJECTION, Inject  under the skin into the appropriate area as directed Every 7 (Seven) Days., Disp: , Rfl:     apixaban (ELIQUIS) 5 MG tablet tablet, Take 1 tablet twice a day by oral route for 30 days., Disp: , Rfl:     chlorhexidine (PERIDEX) 0.12 % solution, Place 15 mL twice a day by mucous membrane route as needed., Disp: , Rfl:     EPINEPHrine (EPIPEN) 0.3 MG/0.3ML solution auto-injector injection, , Disp: , Rfl:     estradiol (ESTRACE) 0.1 MG/GM vaginal cream, Insert 1 g into the vagina 2 (Two) Times a Week., Disp: 42.5 g, Rfl: 2    fluticasone (FLONASE) 50 MCG/ACT nasal spray, , Disp: , Rfl:     levothyroxine (SYNTHROID, LEVOTHROID) 75 MCG tablet, Take 1 tablet by mouth Daily., Disp: , Rfl:     meloxicam (MOBIC) 7.5 MG tablet, TAKE 1 TABLET BY MOUTH DAILY., Disp: 90 tablet, Rfl: 1    nystatin (MYCOSTATIN) 100,000 unit/mL suspension, Take 5 mL 4 times a day by oral route., Disp: , Rfl:     predniSONE (DELTASONE) 50 MG tablet, Take 1 tablet every day by oral route., Disp: , Rfl:     promethazine (PHENERGAN) 25 MG suppository, Insert 1 suppository every 8 hours by rectal route as needed., Disp: , Rfl:     tobramycin-dexamethasone (TOBRADEX) 0.3-0.1 %  pt reports sudden onset of worsening right sided lower back pain and requesting additional medication. MD unavailable @ this time, awaiting return call. "ophthalmic suspension, , Disp: , Rfl:     vancomycin (VANCOCIN) 125 MG capsule, , Disp: , Rfl:     Allergies   Allergen Reactions    Hydrocodone Nausea And Vomiting    Cefepime Hives    Codeine Nausea And Vomiting    Omnicef [Cefdinir] Hives    Oxycodone Nausea And Vomiting       Social History     Tobacco Use    Smoking status: Never    Smokeless tobacco: Never   Vaping Use    Vaping Use: Never used   Substance Use Topics    Alcohol use: No    Drug use: No       Family History   Problem Relation Age of Onset    Cancer Mother     Colon cancer Mother 76    Clotting disorder Other         does not know name    Deep vein thrombosis Other     Breast cancer Neg Hx     Ovarian cancer Neg Hx     Malig Hyperthermia Neg Hx        Review of Systems   Constitutional:  Negative for activity change, appetite change, fatigue, fever and unexpected weight change.   Respiratory:  Negative for cough and shortness of breath.    Cardiovascular:  Negative for chest pain and palpitations.   Gastrointestinal:  Negative for abdominal distention, abdominal pain, constipation, diarrhea and nausea.   Endocrine: Negative for cold intolerance and heat intolerance.   Genitourinary:  Negative for dyspareunia, dysuria, genital sores, hematuria, menstrual problem, pelvic pain, vaginal bleeding, vaginal discharge and vaginal pain.   Musculoskeletal:  Negative for joint swelling.   Skin:  Negative for color change and rash.   Neurological:  Negative for headaches.   Hematological:  Negative for adenopathy. Does not bruise/bleed easily.   Psychiatric/Behavioral:  Negative for dysphoric mood. The patient is not nervous/anxious.      /64   Ht 157.5 cm (62\")   Wt 57.4 kg (126 lb 9.6 oz)   BMI 23.16 kg/mý     Physical Exam   Constitutional: She is oriented to person, place, and time. She appears well-developed.   HENT:   Head: Normocephalic and atraumatic.   Eyes: Conjunctivae are normal. No scleral icterus.   Neck: No thyromegaly present. "   Cardiovascular: Normal rate, regular rhythm and normal heart sounds.   Pulmonary/Chest: Effort normal and breath sounds normal. Right breast exhibits no inverted nipple, no mass, no nipple discharge, no skin change and no tenderness. Left breast exhibits no inverted nipple, no mass, no nipple discharge, no skin change and no tenderness.   Abdominal: Soft. Normal appearance and bowel sounds are normal. She exhibits no distension and no mass. There is no abdominal tenderness. There is no rebound and no guarding. No hernia.   Genitourinary:    Rectum normal.            Pelvic exam was performed with patient supine.   There is no rash, tenderness or lesion on the right labia. There is no rash, tenderness or lesion on the left labia. Uterus is not deviated, not enlarged, not fixed and not tender. Cervix exhibits no motion tenderness, no discharge and no friability. Right adnexum displays no mass, no tenderness and no fullness. Left adnexum displays no mass, no tenderness and no fullness.    Vaginal tenderness present.      No vaginal discharge, erythema or bleeding.   There is tenderness in the vagina. No erythema or bleeding in the vagina.    No foreign body in the vagina.      No signs of injury in the vagina.      Genitourinary Comments: Urethral eversion noted  Mild atrophy noted  LPS noted     Neurological: She is alert and oriented to person, place, and time.   Skin: Skin is warm and dry.   Psychiatric: Her behavior is normal. Mood, judgment and thought content normal.   Nursing note and vitals reviewed.       Assessment/Plan    1) GYN HM:  UTD 2/2021 normal pap. Will check pap today and if normal, it can be her last pap smear. .  SBE demonstrated and encouraged.  2) STD screening: declines Condoms encouraged.  3) Bone health - Weight bearing exercise, dietary calcium recommendations and vitamin D reviewed.   4) Diet and Exercise discussed  5) Smoking Status: non smoker  6) Social:   7)MMG:  UTD 3/2022 B1,  sched MMG now  8) DEXA- UTD 3/2021- osteoporosis. Pt declines therapy so no further DEXAs ordered.   9)C scope- UTD  7/2023, repeat 5 years.   10) Urethral eversion and vaginal atrophy- rec Estrace cream. Patient counseled that vaginal estrogen rings, creams and tablets are available and highly effective at treating local vaginal symptoms such as atrophy and vaginal dryness.  Vaginal estrogen does not cause uterine overgrowth and does NOT require a progestogen to protect the uterus.  Very small amounts of estrogen are absorbed systemically.  For patients with a history of an estrogen dependent cancer such as breast cancer, the decision to use local estrogen for local vaginal symptoms should be made after consultation with her oncologist.  Possible side effects include local irritation or burning and/or vaginal bleeding and should always be reported.    11) Vaginismus- pt asymptomatic, declines PT  12) Parts of this document have been copied or forwarded from her previous visits and have been reviewed, updated and edited as indicated.   14)IFollow up prn  and 2 years annual     Diagnoses and all orders for this visit:    1. Pap smear, low-risk (Primary)  -     Pap IG (Image Guided)    2. Routine gynecological examination  -     POC Urinalysis Dipstick  -     Pap IG (Image Guided)    3. Encounter for screening mammogram for malignant neoplasm of breast  -     Mammo Screening Digital Tomosynthesis Bilateral With CAD; Future    4. Vaginal atrophy    Other orders  -     estradiol (ESTRACE) 0.1 MG/GM vaginal cream; Insert 1 g into the vagina 2 (Two) Times a Week.  Dispense: 42.5 g; Refill: 2        Jazzmine Reyes MD  8/14/2023  15:46 EDT

## 2023-08-20 LAB
CYTOLOGIST CVX/VAG CYTO: NORMAL
CYTOLOGY CVX/VAG DOC CYTO: NORMAL
CYTOLOGY CVX/VAG DOC THIN PREP: NORMAL
DX ICD CODE: NORMAL
HIV 1 & 2 AB SER-IMP: NORMAL
Lab: NORMAL
OTHER STN SPEC: NORMAL
STAT OF ADQ CVX/VAG CYTO-IMP: NORMAL

## 2023-09-06 ENCOUNTER — HOSPITAL ENCOUNTER (OUTPATIENT)
Dept: MAMMOGRAPHY | Facility: HOSPITAL | Age: 73
Discharge: HOME | End: 2023-09-06
Admitting: OBSTETRICS & GYNECOLOGY
Payer: MEDICARE

## 2023-09-06 DIAGNOSIS — Z12.31 ENCOUNTER FOR SCREENING MAMMOGRAM FOR MALIGNANT NEOPLASM OF BREAST: ICD-10-CM

## 2023-09-06 PROCEDURE — 77067 SCR MAMMO BI INCL CAD: CPT

## 2023-09-06 PROCEDURE — 77063 BREAST TOMOSYNTHESIS BI: CPT

## 2023-10-28 ENCOUNTER — HOSPITAL ENCOUNTER (EMERGENCY)
Facility: HOSPITAL | Age: 73
Discharge: HOME | End: 2023-10-28
Attending: EMERGENCY MEDICINE
Payer: MEDICARE

## 2023-10-28 VITALS
SYSTOLIC BLOOD PRESSURE: 94 MMHG | DIASTOLIC BLOOD PRESSURE: 49 MMHG | HEIGHT: 62 IN | BODY MASS INDEX: 23 KG/M2 | TEMPERATURE: 100.5 F | RESPIRATION RATE: 17 BRPM | OXYGEN SATURATION: 97 % | HEART RATE: 74 BPM | WEIGHT: 125 LBS

## 2023-10-28 DIAGNOSIS — U07.1 COVID-19: Primary | ICD-10-CM

## 2023-10-28 LAB
ANION GAP SERPL CALCULATED.3IONS-SCNC: 11.3 MMOL/L (ref 5–15)
BUN SERPL-MCNC: 19 MG/DL (ref 8–23)
BUN/CREAT SERPL: 22.9 (ref 7–25)
CALCIUM SPEC-SCNC: 9 MG/DL (ref 8.6–10.5)
CHLORIDE SERPL-SCNC: 105 MMOL/L (ref 98–107)
CO2 SERPL-SCNC: 20.7 MMOL/L (ref 22–29)
CREAT SERPL-MCNC: 0.83 MG/DL (ref 0.57–1)
EGFRCR SERPLBLD CKD-EPI 2021: 74.1 ML/MIN/1.73
FLUAV RNA RESP QL NAA+PROBE: NOT DETECTED
FLUBV RNA RESP QL NAA+PROBE: NOT DETECTED
GLUCOSE SERPL-MCNC: 118 MG/DL (ref 65–99)
POTASSIUM SERPL-SCNC: 4 MMOL/L (ref 3.5–5.2)
S PYO AG THROAT QL: NEGATIVE
SARS-COV-2 RNA RESP QL NAA+PROBE: DETECTED
SODIUM SERPL-SCNC: 137 MMOL/L (ref 136–145)

## 2023-10-28 PROCEDURE — 99283 EMERGENCY DEPT VISIT LOW MDM: CPT

## 2023-10-28 PROCEDURE — 87880 STREP A ASSAY W/OPTIC: CPT

## 2023-10-28 PROCEDURE — 80048 BASIC METABOLIC PNL TOTAL CA: CPT | Performed by: EMERGENCY MEDICINE

## 2023-10-28 PROCEDURE — 87636 SARSCOV2 & INF A&B AMP PRB: CPT

## 2023-10-28 PROCEDURE — 87081 CULTURE SCREEN ONLY: CPT | Performed by: EMERGENCY MEDICINE

## 2023-10-28 RX ORDER — ACETAMINOPHEN 500 MG
1000 TABLET ORAL ONCE
Status: COMPLETED | OUTPATIENT
Start: 2023-10-28 | End: 2023-10-28

## 2023-10-28 RX ADMIN — ACETAMINOPHEN 1000 MG: 500 TABLET ORAL at 19:55

## 2023-10-29 NOTE — ED PROVIDER NOTES
Subjective   History of Present Illness    Chief complaint: Fever chills and body aches    Location: Home    Quality/Severity: Moderate    Timing/Onset/Duration: Started yesterday    Modifying Factors: Nothing makes it better    Associated Symptoms: Mild headache.  Subjective fever.  Patient said chills and body aches.  No sore throat.  Patient said clear nasal congestion.  No chest pain or shortness of breath.  No abdominal pain.  No diarrhea or burning when she urinates.  No nausea or vomiting.      Narrative: This 73-year-old white female presents with fever and generalized body aches and nausea for the last day.    PCP:Jim Michael MD      Review of Systems   Constitutional:  Positive for chills and fever.   HENT:  Negative for congestion, ear pain and sore throat.    Respiratory:  Positive for cough. Negative for shortness of breath.    Cardiovascular:  Negative for chest pain.   Gastrointestinal:  Negative for abdominal pain, diarrhea, nausea and vomiting.   Genitourinary:  Negative for difficulty urinating.   Musculoskeletal:  Negative for back pain.   Skin:  Negative for rash.   Neurological:  Positive for headaches. Negative for weakness.         Past Medical History:   Diagnosis Date    Abnormal vaginal Pap smear 09/10/1999    ascus    Abnormal vaginal Pap smear 11/06/2000    lgsil    Arthritis     Asthma     h/o, no inhaler    Disease of thyroid gland     hypothyroid    DJD (degenerative joint disease) of knee     sched TKA left    History of colposcopy 10/11/1999    chronic endocervicitis    History of UTI     Hole in the ear drum, right     d/t tube    Osteoporosis     Spinal headache        Allergies   Allergen Reactions    Hydrocodone Nausea And Vomiting    Cefepime Hives    Codeine Nausea And Vomiting    Omnicef [Cefdinir] Hives    Oxycodone Nausea And Vomiting       Past Surgical History:   Procedure Laterality Date    BREAST CYST ASPIRATION Right     20 yrs ago    BREAST CYST INCISION AND  DRAINAGE  12/1996, 1997    CHOLECYSTECTOMY  03/2016    COLONOSCOPY      COLONOSCOPY N/A 7/27/2023    Procedure: COLONOSCOPY;  Surgeon: Evangelista Brower MD;  Location:  [facility] OR;  Service: Gastroenterology;  Laterality: N/A;  Diverticulosis; External hemorrhoids    EAR TUBES      TOTAL KNEE ARTHROPLASTY Left 04/22/2019    Procedure: left total knee arthroplasty, possible placement of ON-Q pump, and all associated procedures;  Surgeon: Dhaval Quinn MD;  Location:  [facility] OR;  Service: Orthopedics    TUBAL ABDOMINAL LIGATION      VARICOSE VEIN SURGERY  2015    Pt states she had varicose veins removed on both legs (2-3 years ago).       Family History   Problem Relation Age of Onset    Cancer Mother     Colon cancer Mother 76    Clotting disorder Other         does not know name    Deep vein thrombosis Other     Breast cancer Neg Hx     Ovarian cancer Neg Hx     Malig Hyperthermia Neg Hx        Social History     Socioeconomic History    Marital status:    Tobacco Use    Smoking status: Never    Smokeless tobacco: Never   Vaping Use    Vaping Use: Never used   Substance and Sexual Activity    Alcohol use: No    Drug use: No    Sexual activity: Not Currently     Partners: Male     Birth control/protection: Post-menopausal     Comment:            Objective   Physical Exam  Vitals (The temperature is 100, pulse 96, respirations 18, /68, room air pulse ox 98%.) and nursing note reviewed.   Constitutional:       Appearance: Normal appearance.   HENT:      Head: Normocephalic and atraumatic.      Right Ear: Tympanic membrane normal.      Left Ear: Tympanic membrane normal.      Nose: Rhinorrhea present.      Mouth/Throat:      Mouth: Mucous membranes are moist.   Cardiovascular:      Rate and Rhythm: Normal rate and regular rhythm.      Pulses: Normal pulses.      Heart sounds: Normal heart sounds. No murmur heard.     No friction rub. No gallop.   Pulmonary:      Effort: Pulmonary effort is normal.       Breath sounds: Normal breath sounds.   Abdominal:      General: Abdomen is flat. Bowel sounds are normal. There is no distension.      Palpations: Abdomen is soft. There is no mass.      Tenderness: There is no abdominal tenderness. There is no guarding or rebound.      Hernia: No hernia is present.   Musculoskeletal:         General: No swelling or tenderness. Normal range of motion.      Cervical back: Normal range of motion and neck supple. No rigidity or tenderness.   Skin:     General: Skin is warm and dry.      Findings: No rash.   Neurological:      General: No focal deficit present.      Mental Status: She is alert and oriented to person, place, and time.      Cranial Nerves: No cranial nerve deficit.      Sensory: No sensory deficit.      Motor: No weakness.      Coordination: Coordination normal.         Procedures           ED Course  ED Course as of 10/28/23 2210   Sat Oct 28, 2023   2034 The COVID flu is positive for COVID-19. [RC]   2034 Influenza is negative. [RC]   2034 The rapid strep is negative. [RC]   2205 The glucose is 118, CO2 20, GFR 74.  The BMP is otherwise unremarkable. [RC]      ED Course User Index  [RC] Joseph Crow MD      22:10 EDT, 10/28/23:  The patient's medication list was reviewed with the pharmacist.  The patient should decrease her Eliquis to 2.5 mg 1 p.o. twice daily while she is on the Paxlovid.  The patient has an adequate GFR to prescribe full-strength Paxlovid.  The patient should rest.  She should drink plenty of fluids.  She should take Motrin or Tylenol as needed as directed for fever and chills and body aches.  The patient should follow-up with Dr. Jim Michael in 1 week.  She should return to the emergency department if there is shortness of breath, worse in any way at all.  All patient questions were answered she will be discharged in good condition.     22:14 EDT, 10/28/23:  The patient was reassessed.  She has no new complaints.  Her vital signs were  reviewed and are stable.  She has no shortness of breath.                                Medical Decision Making  Risk  OTC drugs.        Final diagnoses:   COVID-19       ED Disposition  ED Disposition       None            No follow-up provider specified.       Medication List      No changes were made to your prescriptions during this visit.            Joseph Crow MD  10/28/23 5309       Joseph Crow MD  10/28/23 5970

## 2023-10-29 NOTE — DISCHARGE INSTRUCTIONS
Rest.  Drink plenty of fluids.  Take Motrin or Tylenol as needed as directed for fever, chills, and body aches.  Take the Paxlovid as prescribed.  Change your Eliquis dose to 2.5 mg 1 p.o. twice daily for 5 days, while you are taking the Paxlovid, then resume your usual dose.

## 2023-10-30 LAB — BACTERIA SPEC AEROBE CULT: NORMAL

## 2023-12-26 ENCOUNTER — APPOINTMENT (OUTPATIENT)
Dept: GENERAL RADIOLOGY | Facility: HOSPITAL | Age: 73
End: 2023-12-26
Payer: MEDICARE

## 2023-12-26 ENCOUNTER — HOSPITAL ENCOUNTER (OUTPATIENT)
Facility: HOSPITAL | Age: 73
Setting detail: OBSERVATION
Discharge: HOME | End: 2023-12-27
Attending: EMERGENCY MEDICINE | Admitting: HOSPITALIST
Payer: MEDICARE

## 2023-12-26 DIAGNOSIS — R07.89 CHEST PAIN, ATYPICAL: ICD-10-CM

## 2023-12-26 DIAGNOSIS — R07.2 PRECORDIAL PAIN: Primary | ICD-10-CM

## 2023-12-26 LAB
ALBUMIN SERPL-MCNC: 4.7 G/DL (ref 3.5–5.2)
ALBUMIN/GLOB SERPL: 2.1 G/DL
ALP SERPL-CCNC: 105 U/L (ref 39–117)
ALT SERPL W P-5'-P-CCNC: 20 U/L (ref 1–33)
ANION GAP SERPL CALCULATED.3IONS-SCNC: 7.2 MMOL/L (ref 5–15)
AST SERPL-CCNC: 24 U/L (ref 1–32)
BASOPHILS # BLD AUTO: 0.06 10*3/MM3 (ref 0–0.2)
BASOPHILS NFR BLD AUTO: 0.6 % (ref 0–1.5)
BILIRUB SERPL-MCNC: 0.3 MG/DL (ref 0–1.2)
BUN SERPL-MCNC: 20 MG/DL (ref 8–23)
BUN/CREAT SERPL: 22.5 (ref 7–25)
CALCIUM SPEC-SCNC: 10 MG/DL (ref 8.6–10.5)
CHLORIDE SERPL-SCNC: 103 MMOL/L (ref 98–107)
CHOLEST SERPL-MCNC: 195 MG/DL (ref 0–200)
CO2 SERPL-SCNC: 26.8 MMOL/L (ref 22–29)
CREAT SERPL-MCNC: 0.89 MG/DL (ref 0.57–1)
DEPRECATED RDW RBC AUTO: 46.4 FL (ref 37–54)
EGFRCR SERPLBLD CKD-EPI 2021: 68.1 ML/MIN/1.73
EOSINOPHIL # BLD AUTO: 0.18 10*3/MM3 (ref 0–0.4)
EOSINOPHIL NFR BLD AUTO: 1.9 % (ref 0.3–6.2)
ERYTHROCYTE [DISTWIDTH] IN BLOOD BY AUTOMATED COUNT: 13.6 % (ref 12.3–15.4)
GEN 5 2HR TROPONIN T REFLEX: 7 NG/L
GLOBULIN UR ELPH-MCNC: 2.2 GM/DL
GLUCOSE SERPL-MCNC: 92 MG/DL (ref 65–99)
HBA1C MFR BLD: 5.4 % (ref 4.8–5.6)
HCT VFR BLD AUTO: 41.7 % (ref 34–46.6)
HDLC SERPL-MCNC: 54 MG/DL (ref 40–60)
HGB BLD-MCNC: 13.5 G/DL (ref 12–15.9)
IMM GRANULOCYTES # BLD AUTO: 0.04 10*3/MM3 (ref 0–0.05)
IMM GRANULOCYTES NFR BLD AUTO: 0.4 % (ref 0–0.5)
LDLC SERPL CALC-MCNC: 117 MG/DL (ref 0–100)
LDLC/HDLC SERPL: 2.12 {RATIO}
LYMPHOCYTES # BLD AUTO: 4.28 10*3/MM3 (ref 0.7–3.1)
LYMPHOCYTES NFR BLD AUTO: 44.1 % (ref 19.6–45.3)
MCH RBC QN AUTO: 30.2 PG (ref 26.6–33)
MCHC RBC AUTO-ENTMCNC: 32.4 G/DL (ref 31.5–35.7)
MCV RBC AUTO: 93.3 FL (ref 79–97)
MONOCYTES # BLD AUTO: 0.74 10*3/MM3 (ref 0.1–0.9)
MONOCYTES NFR BLD AUTO: 7.6 % (ref 5–12)
NEUTROPHILS NFR BLD AUTO: 4.41 10*3/MM3 (ref 1.7–7)
NEUTROPHILS NFR BLD AUTO: 45.4 % (ref 42.7–76)
NRBC BLD AUTO-RTO: 0 /100 WBC (ref 0–0.2)
NT-PROBNP SERPL-MCNC: 91.4 PG/ML (ref 0–900)
PLATELET # BLD AUTO: 235 10*3/MM3 (ref 140–450)
PMV BLD AUTO: 10.1 FL (ref 6–12)
POTASSIUM SERPL-SCNC: 4 MMOL/L (ref 3.5–5.2)
PROT SERPL-MCNC: 6.9 G/DL (ref 6–8.5)
RBC # BLD AUTO: 4.47 10*6/MM3 (ref 3.77–5.28)
SODIUM SERPL-SCNC: 137 MMOL/L (ref 136–145)
TRIGL SERPL-MCNC: 133 MG/DL (ref 0–150)
TROPONIN T DELTA: NORMAL
TROPONIN T SERPL HS-MCNC: <6 NG/L
TSH SERPL DL<=0.05 MIU/L-ACNC: 2.69 UIU/ML (ref 0.27–4.2)
VLDLC SERPL-MCNC: 24 MG/DL (ref 5–40)
WBC NRBC COR # BLD AUTO: 9.71 10*3/MM3 (ref 3.4–10.8)

## 2023-12-26 PROCEDURE — 99222 1ST HOSP IP/OBS MODERATE 55: CPT | Performed by: NURSE PRACTITIONER

## 2023-12-26 PROCEDURE — 96372 THER/PROPH/DIAG INJ SC/IM: CPT

## 2023-12-26 PROCEDURE — 25010000002 ENOXAPARIN PER 10 MG

## 2023-12-26 PROCEDURE — 71045 X-RAY EXAM CHEST 1 VIEW: CPT

## 2023-12-26 PROCEDURE — 80053 COMPREHEN METABOLIC PANEL: CPT | Performed by: EMERGENCY MEDICINE

## 2023-12-26 PROCEDURE — 84443 ASSAY THYROID STIM HORMONE: CPT | Performed by: NURSE PRACTITIONER

## 2023-12-26 PROCEDURE — G0378 HOSPITAL OBSERVATION PER HR: HCPCS

## 2023-12-26 PROCEDURE — 93005 ELECTROCARDIOGRAM TRACING: CPT | Performed by: EMERGENCY MEDICINE

## 2023-12-26 PROCEDURE — 85025 COMPLETE CBC W/AUTO DIFF WBC: CPT | Performed by: EMERGENCY MEDICINE

## 2023-12-26 PROCEDURE — 84484 ASSAY OF TROPONIN QUANT: CPT | Performed by: EMERGENCY MEDICINE

## 2023-12-26 PROCEDURE — 36415 COLL VENOUS BLD VENIPUNCTURE: CPT

## 2023-12-26 PROCEDURE — 80061 LIPID PANEL: CPT | Performed by: NURSE PRACTITIONER

## 2023-12-26 PROCEDURE — 83880 ASSAY OF NATRIURETIC PEPTIDE: CPT | Performed by: EMERGENCY MEDICINE

## 2023-12-26 PROCEDURE — 99284 EMERGENCY DEPT VISIT MOD MDM: CPT

## 2023-12-26 PROCEDURE — 83036 HEMOGLOBIN GLYCOSYLATED A1C: CPT | Performed by: NURSE PRACTITIONER

## 2023-12-26 RX ORDER — SODIUM CHLORIDE 0.9 % (FLUSH) 0.9 %
10 SYRINGE (ML) INJECTION AS NEEDED
Status: DISCONTINUED | OUTPATIENT
Start: 2023-12-26 | End: 2023-12-27 | Stop reason: HOSPADM

## 2023-12-26 RX ORDER — CHOLECALCIFEROL (VITAMIN D3) 125 MCG
5 CAPSULE ORAL NIGHTLY PRN
Status: DISCONTINUED | OUTPATIENT
Start: 2023-12-26 | End: 2023-12-27 | Stop reason: HOSPADM

## 2023-12-26 RX ORDER — ACETAMINOPHEN 650 MG/1
650 SUPPOSITORY RECTAL EVERY 4 HOURS PRN
Status: DISCONTINUED | OUTPATIENT
Start: 2023-12-26 | End: 2023-12-27 | Stop reason: HOSPADM

## 2023-12-26 RX ORDER — ENOXAPARIN SODIUM 100 MG/ML
1 INJECTION SUBCUTANEOUS ONCE
Status: COMPLETED | OUTPATIENT
Start: 2023-12-26 | End: 2023-12-26

## 2023-12-26 RX ORDER — ASPIRIN 81 MG/1
324 TABLET, CHEWABLE ORAL ONCE
Status: COMPLETED | OUTPATIENT
Start: 2023-12-26 | End: 2023-12-26

## 2023-12-26 RX ORDER — ONDANSETRON 4 MG/1
4 TABLET, ORALLY DISINTEGRATING ORAL EVERY 6 HOURS PRN
Status: DISCONTINUED | OUTPATIENT
Start: 2023-12-26 | End: 2023-12-27 | Stop reason: HOSPADM

## 2023-12-26 RX ORDER — SODIUM CHLORIDE 9 MG/ML
40 INJECTION, SOLUTION INTRAVENOUS AS NEEDED
Status: DISCONTINUED | OUTPATIENT
Start: 2023-12-26 | End: 2023-12-27 | Stop reason: HOSPADM

## 2023-12-26 RX ORDER — NITROGLYCERIN 0.4 MG/1
0.4 TABLET SUBLINGUAL
Status: COMPLETED | OUTPATIENT
Start: 2023-12-26 | End: 2023-12-26

## 2023-12-26 RX ORDER — SODIUM CHLORIDE 0.9 % (FLUSH) 0.9 %
10 SYRINGE (ML) INJECTION EVERY 12 HOURS SCHEDULED
Status: DISCONTINUED | OUTPATIENT
Start: 2023-12-26 | End: 2023-12-27 | Stop reason: HOSPADM

## 2023-12-26 RX ORDER — SODIUM CHLORIDE 9 MG/ML
100 INJECTION, SOLUTION INTRAVENOUS CONTINUOUS
Status: DISCONTINUED | OUTPATIENT
Start: 2023-12-27 | End: 2023-12-27 | Stop reason: HOSPADM

## 2023-12-26 RX ORDER — BISACODYL 10 MG
10 SUPPOSITORY, RECTAL RECTAL DAILY PRN
Status: DISCONTINUED | OUTPATIENT
Start: 2023-12-26 | End: 2023-12-27 | Stop reason: HOSPADM

## 2023-12-26 RX ORDER — ONDANSETRON 2 MG/ML
4 INJECTION INTRAMUSCULAR; INTRAVENOUS EVERY 6 HOURS PRN
Status: DISCONTINUED | OUTPATIENT
Start: 2023-12-26 | End: 2023-12-27 | Stop reason: HOSPADM

## 2023-12-26 RX ORDER — ENOXAPARIN SODIUM 100 MG/ML
INJECTION SUBCUTANEOUS
Status: COMPLETED
Start: 2023-12-26 | End: 2023-12-26

## 2023-12-26 RX ORDER — ACETAMINOPHEN 160 MG/5ML
650 SOLUTION ORAL EVERY 4 HOURS PRN
Status: DISCONTINUED | OUTPATIENT
Start: 2023-12-26 | End: 2023-12-27 | Stop reason: HOSPADM

## 2023-12-26 RX ORDER — NITROGLYCERIN 0.4 MG/1
0.4 TABLET SUBLINGUAL
Status: DISCONTINUED | OUTPATIENT
Start: 2023-12-26 | End: 2023-12-27 | Stop reason: HOSPADM

## 2023-12-26 RX ORDER — POLYETHYLENE GLYCOL 3350 17 G/17G
17 POWDER, FOR SOLUTION ORAL DAILY PRN
Status: DISCONTINUED | OUTPATIENT
Start: 2023-12-26 | End: 2023-12-27 | Stop reason: HOSPADM

## 2023-12-26 RX ORDER — BISACODYL 5 MG/1
5 TABLET, DELAYED RELEASE ORAL DAILY PRN
Status: DISCONTINUED | OUTPATIENT
Start: 2023-12-26 | End: 2023-12-27 | Stop reason: HOSPADM

## 2023-12-26 RX ORDER — AMOXICILLIN 250 MG
2 CAPSULE ORAL 2 TIMES DAILY
Status: DISCONTINUED | OUTPATIENT
Start: 2023-12-26 | End: 2023-12-27 | Stop reason: HOSPADM

## 2023-12-26 RX ORDER — ACETAMINOPHEN 325 MG/1
650 TABLET ORAL EVERY 4 HOURS PRN
Status: DISCONTINUED | OUTPATIENT
Start: 2023-12-26 | End: 2023-12-27 | Stop reason: HOSPADM

## 2023-12-26 RX ADMIN — ASPIRIN 81 MG CHEWABLE TABLET 324 MG: 81 TABLET CHEWABLE at 18:19

## 2023-12-26 RX ADMIN — NITROGLYCERIN 1 INCH: 20 OINTMENT TOPICAL at 19:39

## 2023-12-26 RX ADMIN — NITROGLYCERIN 0.4 MG: 0.4 TABLET SUBLINGUAL at 18:36

## 2023-12-26 RX ADMIN — NITROGLYCERIN 0.4 MG: 0.4 TABLET SUBLINGUAL at 18:21

## 2023-12-26 RX ADMIN — ENOXAPARIN SODIUM 60 MG: 60 INJECTION SUBCUTANEOUS at 19:40

## 2023-12-26 RX ADMIN — ENOXAPARIN SODIUM 60 MG: 100 INJECTION SUBCUTANEOUS at 19:40

## 2023-12-26 RX ADMIN — NITROGLYCERIN 0.4 MG: 0.4 TABLET SUBLINGUAL at 18:46

## 2023-12-26 NOTE — ED PROVIDER NOTES
"Subjective   History of Present Illness  Nikkie Kennedy is a 73-year-old white female who presents secondary to substernal chest pressure.  Onset of pressure approximate 1 hour prior to ER arrival.  Patient states she was talking on the phone at time of onset.  The pressure radiates to the left arm.  Patient describes the pressure as if a \"light elephant\" is standing on her chest.  No shortness of breath.  No diaphoresis.  No syncope.  No nausea or vomiting.  Patient did not note any aggravating or alleviating factors.  She presents for evaluation.    History provided by:  Patient      Review of Systems   Constitutional: Negative.  Negative for fever.   HENT: Negative.  Negative for rhinorrhea.    Eyes: Negative.  Negative for redness.   Respiratory:  Negative for cough.    Cardiovascular:  Positive for chest pain (As in HPI).   Gastrointestinal:  Negative for abdominal pain.   Endocrine: Negative.    Genitourinary: Negative.  Negative for difficulty urinating.   Musculoskeletal: Negative.  Negative for back pain.   Skin: Negative.  Negative for color change.   Neurological: Negative.  Negative for syncope.   Hematological: Negative.    Psychiatric/Behavioral: Negative.     All other systems reviewed and are negative.      Past Medical History:   Diagnosis Date    Abnormal vaginal Pap smear 09/10/1999    ascus    Abnormal vaginal Pap smear 11/06/2000    lgsil    Arthritis     Asthma     h/o, no inhaler    Disease of thyroid gland     hypothyroid    DJD (degenerative joint disease) of knee     sched TKA left    History of colposcopy 10/11/1999    chronic endocervicitis    History of UTI     Hole in the ear drum, right     d/t tube    Osteoporosis     Pulmonary embolism     Spinal headache     Tick bite     Pt on vent 18 days, hospitalized 34 days from illness from tick       Allergies   Allergen Reactions    Hydrocodone Nausea And Vomiting    Cefepime Hives    Codeine Nausea And Vomiting    Omnicef [Cefdinir] Hives    " Oxycodone Nausea And Vomiting       Past Surgical History:   Procedure Laterality Date    BREAST CYST ASPIRATION Right     20 yrs ago    BREAST CYST INCISION AND DRAINAGE  12/1996, 1997    CHOLECYSTECTOMY  03/2016    COLONOSCOPY      COLONOSCOPY N/A 7/27/2023    Procedure: COLONOSCOPY;  Surgeon: Evangelista Brower MD;  Location: [facility];  Service: Gastroenterology;  Laterality: N/A;  Diverticulosis; External hemorrhoids    EAR TUBES      TOTAL KNEE ARTHROPLASTY Left 04/22/2019    Procedure: left total knee arthroplasty, possible placement of ON-Q pump, and all associated procedures;  Surgeon: Dhaval Quinn MD;  Location: [facility] OR;  Service: Orthopedics    TUBAL ABDOMINAL LIGATION      VARICOSE VEIN SURGERY  2015    Pt states she had varicose veins removed on both legs (2-3 years ago).       Family History   Problem Relation Age of Onset    Cancer Mother     Colon cancer Mother 76    Clotting disorder Other         does not know name    Deep vein thrombosis Other     Breast cancer Neg Hx     Ovarian cancer Neg Hx     Malig Hyperthermia Neg Hx        Social History     Socioeconomic History    Marital status:    Tobacco Use    Smoking status: Never    Smokeless tobacco: Never   Vaping Use    Vaping Use: Never used   Substance and Sexual Activity    Alcohol use: No    Drug use: No    Sexual activity: Not Currently     Partners: Male     Birth control/protection: Post-menopausal     Comment:            Objective   Physical Exam  Vitals and nursing note reviewed.   Constitutional:       General: She is not in acute distress.     Appearance: Normal appearance. She is well-developed. She is not ill-appearing, toxic-appearing or diaphoretic.      Comments: 73-year-old white female lying in bed.  Patient appears in good overall health.  Vital signs notable for BP of 150/79.  Otherwise unremarkable.  Patient is friendly and cooperative.  She reports ongoing chest pressure.  Spouse is at bedside.   HENT:       Head: Normocephalic and atraumatic.      Right Ear: Tympanic membrane, ear canal and external ear normal.      Left Ear: Tympanic membrane, ear canal and external ear normal.      Nose: Nose normal.      Mouth/Throat:      Mouth: Mucous membranes are moist.      Pharynx: Oropharynx is clear.   Eyes:      Extraocular Movements: Extraocular movements intact.      Conjunctiva/sclera: Conjunctivae normal.      Pupils: Pupils are equal, round, and reactive to light.   Cardiovascular:      Rate and Rhythm: Normal rate and regular rhythm.      Pulses: Normal pulses.      Heart sounds: Normal heart sounds. No murmur heard.     No friction rub. No gallop.   Pulmonary:      Effort: Pulmonary effort is normal.      Breath sounds: Normal breath sounds. No decreased breath sounds, wheezing, rhonchi or rales.   Abdominal:      General: Bowel sounds are normal. There is no distension.      Palpations: Abdomen is soft. There is no mass.      Tenderness: There is no abdominal tenderness. There is no guarding or rebound.      Hernia: No hernia is present.   Musculoskeletal:         General: Normal range of motion.      Cervical back: Normal range of motion and neck supple.   Skin:     General: Skin is warm and dry.      Capillary Refill: Capillary refill takes less than 2 seconds.   Neurological:      General: No focal deficit present.      Mental Status: She is alert and oriented to person, place, and time.      Deep Tendon Reflexes: Reflexes are normal and symmetric.   Psychiatric:         Mood and Affect: Mood normal.         Behavior: Behavior normal.         Procedures     EKG 12-lead  Date 12/26/2023  Time 18: 08  Normal sinus rhythm  Normal rate  Leftward axis  Left atrial enlargement  Left anterior fascicular block present  Nonspecific repolarization abnormalities seen in lateral leads  T wave abnormalities present in several leads  Abnormal EKG    Unchanged from EKG dated 8/22/2022      ED Course  ED Course as of  12/26/23 2345   Tue Dec 26, 2023   1813 Patient has ongoing chest pressure.  Obtaining cardiac workup.  Given aspirin and sublingual nitroglycerin.  Patient currently rates the pressure at a 5. [SS]   1907 CBC and CMP unremarkable.  Troponin negative.  Pro-BNP unremarkable. [SS]   1918 Patient reports complete relief of chest pain with sublingual nitroglycerin.  Consulting cardiology. [SS]   1933 Discussed the case with Dr. Maria.  He recommends patient be hospitalized here at [facility].  If additional troponins are negative he plans to perform stress test tomorrow.    Have discussed with Mrs. Kennedy and her spouse all results, diagnoses, indications for hospitalization and further cardiac evaluation.  She acknowledges understanding.    Have discussed with Kassei Jones.  She will hospitalize patient for further care. [SS]      ED Course User Index  [SS] Alfonso Marcus MD                                 Labs Reviewed   CBC WITH AUTO DIFFERENTIAL - Abnormal; Notable for the following components:       Result Value    Lymphocytes, Absolute 4.28 (*)     All other components within normal limits   LIPID PANEL - Abnormal; Notable for the following components:    LDL Cholesterol  117 (*)     All other components within normal limits    Narrative:     Cholesterol Reference Ranges  (U.S. Department of Health and Human Services ATP III Classifications)    Desirable          <200 mg/dL  Borderline High    200-239 mg/dL  High Risk          >240 mg/dL      Triglyceride Reference Ranges  (U.S. Department of Health and Human Services ATP III Classifications)    Normal           <150 mg/dL  Borderline High  150-199 mg/dL  High             200-499 mg/dL  Very High        >500 mg/dL    HDL Reference Ranges  (U.S. Department of Health and Human Services ATP III Classifications)    Low     <40 mg/dl (major risk factor for CHD)  High    >60 mg/dl ('negative' risk factor for CHD)        LDL Reference Ranges  (U.S.  Department of Health and Human Services ATP III Classifications)    Optimal          <100 mg/dL  Near Optimal     100-129 mg/dL  Borderline High  130-159 mg/dL  High             160-189 mg/dL  Very High        >189 mg/dL   TROPONIN - Normal    Narrative:     High Sensitive Troponin T Reference Range:  <14.0 ng/L- Negative Female for AMI  <22.0 ng/L- Negative Male for AMI  >=14 - Abnormal Female indicating possible myocardial injury.  >=22 - Abnormal Male indicating possible myocardial injury.   Clinicians would have to utilize clinical acumen, EKG, Troponin, and serial changes to determine if it is an Acute Myocardial Infarction or myocardial injury due to an underlying chronic condition.        BNP (IN-HOUSE) - Normal    Narrative:     This assay is used as an aid in the diagnosis of individuals suspected of having heart failure. It can be used as an aid in the diagnosis of acute decompensated heart failure (ADHF) in patients presenting with signs and symptoms of ADHF to the emergency department (ED). In addition, NT-proBNP of <300 pg/mL indicates ADHF is not likely.    Age Range Result Interpretation  NT-proBNP Concentration (pg/mL:      <50             Positive            >450                   Gray                 300-450                    Negative             <300    50-75           Positive            >900                  Gray                300-900                  Negative            <300      >75             Positive            >1800                  Gray                300-1800                  Negative            <300   HEMOGLOBIN A1C - Normal    Narrative:     Hemoglobin A1C Ranges:    Increased Risk for Diabetes  5.7% to 6.4%  Diabetes                     >= 6.5%  Diabetic Goal                < 7.0%   TSH - Normal   COMPREHENSIVE METABOLIC PANEL    Narrative:     GFR Normal >60  Chronic Kidney Disease <60  Kidney Failure <15    The GFR formula is only valid for adults with stable renal function  between ages 18 and 70.   HIGH SENSITIVITIY TROPONIN T 2HR    Narrative:     High Sensitive Troponin T Reference Range:  <14.0 ng/L- Negative Female for AMI  <22.0 ng/L- Negative Male for AMI  >=14 - Abnormal Female indicating possible myocardial injury.  >=22 - Abnormal Male indicating possible myocardial injury.   Clinicians would have to utilize clinical acumen, EKG, Troponin, and serial changes to determine if it is an Acute Myocardial Infarction or myocardial injury due to an underlying chronic condition.        CBC AND DIFFERENTIAL    Narrative:     The following orders were created for panel order CBC & Differential.  Procedure                               Abnormality         Status                     ---------                               -----------         ------                     CBC Auto Differential[[id number]]        Abnormal            Final result                 Please view results for these tests on the individual orders.       XR Chest 1 View    Result Date: 12/26/2023  Narrative: Chest radiograph INDICATION: Chest pressure COMPARISON: 8/22/2022 radiograph TECHNIQUE: Single frontal view of the chest. FINDINGS: Normal cardiac silhouette. Normal mediastinum. No focal parenchymal consolidation. No pleural effusion or pneumothorax. No acute osseous abnormalities.     Impression: No evidence of acute cardiopulmonary disease. Signer Name: Inocente Reis MD Signed: 12/26/2023 7:58 PM EST [facility]     My differential diagnosis for chest pain includes but is not limited to:  Muscle strain, costochondritis, myositis, pleurisy, rib fracture, intercostal neuritis, herpes zoster, tumor, myocardial infarction, coronary syndrome, unstable angina, angina, aortic dissection, mitral valve prolapse, pericarditis, palpitations, pulmonary embolus, pneumonia, pneumothorax, lung cancer, GERD, esophagitis, esophageal spasm              Medical Decision Making  Problems  Addressed:  Precordial pain: complicated acute illness or injury    Amount and/or Complexity of Data Reviewed  Labs: ordered.  Radiology: ordered.  ECG/medicine tests: ordered.    Risk  OTC drugs.  Prescription drug management.  Decision regarding hospitalization.        Final diagnoses:   Precordial pain       ED Disposition  ED Disposition       ED Disposition   Decision to Admit    Condition   --    Comment   Level of Care: Telemetry [5]   Admitting Physician: MARIBEL CARREON [[id number]]   Attending Physician: MARIBEL CARREON [[id number]]   Patient Class: Observation [104]                 No follow-up provider specified.       Medication List      No changes were made to your prescriptions during this visit.            Alfonso Marcus MD  12/26/23 5853

## 2023-12-26 NOTE — Clinical Note
Level of Care: Telemetry [5]   Diagnosis: Chest pain [210384]   Admitting Physician: MARIBEL CARREON [[id number]]   Attending Physician: MARIBEL CARREON [[id number]]

## 2023-12-27 ENCOUNTER — APPOINTMENT (OUTPATIENT)
Dept: NUCLEAR MEDICINE | Facility: HOSPITAL | Age: 73
End: 2023-12-27
Payer: MEDICARE

## 2023-12-27 ENCOUNTER — APPOINTMENT (OUTPATIENT)
Dept: CARDIOLOGY | Facility: HOSPITAL | Age: 73
End: 2023-12-27
Payer: MEDICARE

## 2023-12-27 VITALS
TEMPERATURE: 98.1 F | HEIGHT: 62 IN | OXYGEN SATURATION: 96 % | SYSTOLIC BLOOD PRESSURE: 97 MMHG | RESPIRATION RATE: 16 BRPM | DIASTOLIC BLOOD PRESSURE: 54 MMHG | HEART RATE: 68 BPM | BODY MASS INDEX: 22.31 KG/M2 | WEIGHT: 121.25 LBS

## 2023-12-27 LAB
AORTIC DIMENSIONLESS INDEX: 0.9 (DI)
BH CV ECHO MEAS - AO MAX PG: 9.9 MMHG
BH CV ECHO MEAS - AO MEAN PG: 5 MMHG
BH CV ECHO MEAS - AO V2 MAX: 157 CM/SEC
BH CV ECHO MEAS - AO V2 VTI: 29.8 CM
BH CV ECHO MEAS - AVA(I,D): 2.4 CM2
BH CV ECHO MEAS - EDV(CUBED): 42.9 ML
BH CV ECHO MEAS - EDV(MOD-SP2): 38 ML
BH CV ECHO MEAS - EDV(MOD-SP4): 33 ML
BH CV ECHO MEAS - EF(MOD-BP): 67 %
BH CV ECHO MEAS - EF(MOD-SP2): 63.2 %
BH CV ECHO MEAS - EF(MOD-SP4): 69.7 %
BH CV ECHO MEAS - EF_3D-VOL: 62 %
BH CV ECHO MEAS - ESV(CUBED): 15.4 ML
BH CV ECHO MEAS - ESV(MOD-SP2): 14 ML
BH CV ECHO MEAS - ESV(MOD-SP4): 10 ML
BH CV ECHO MEAS - FS: 28.8 %
BH CV ECHO MEAS - IVS/LVPW: 1.11 CM
BH CV ECHO MEAS - IVSD: 1 CM
BH CV ECHO MEAS - LAT PEAK E' VEL: 10.6 CM/SEC
BH CV ECHO MEAS - LV DIASTOLIC VOL/BSA (35-75): 21.4 CM2
BH CV ECHO MEAS - LV MASS(C)D: 95.9 GRAMS
BH CV ECHO MEAS - LV MAX PG: 7.2 MMHG
BH CV ECHO MEAS - LV MEAN PG: 4 MMHG
BH CV ECHO MEAS - LV SYSTOLIC VOL/BSA (12-30): 6.5 CM2
BH CV ECHO MEAS - LV V1 MAX: 134 CM/SEC
BH CV ECHO MEAS - LV V1 VTI: 25.4 CM
BH CV ECHO MEAS - LVIDD: 3.5 CM
BH CV ECHO MEAS - LVIDS: 2.49 CM
BH CV ECHO MEAS - LVOT AREA: 2.8 CM2
BH CV ECHO MEAS - LVOT DIAM: 1.89 CM
BH CV ECHO MEAS - LVPWD: 0.9 CM
BH CV ECHO MEAS - MED PEAK E' VEL: 6.1 CM/SEC
BH CV ECHO MEAS - MV A MAX VEL: 84.4 CM/SEC
BH CV ECHO MEAS - MV DEC SLOPE: 315 CM/SEC2
BH CV ECHO MEAS - MV DEC TIME: 0.23 SEC
BH CV ECHO MEAS - MV E MAX VEL: 63.9 CM/SEC
BH CV ECHO MEAS - MV E/A: 0.76
BH CV ECHO MEAS - MV MAX PG: 2.6 MMHG
BH CV ECHO MEAS - MV MEAN PG: 1.08 MMHG
BH CV ECHO MEAS - MV P1/2T: 70.9 MSEC
BH CV ECHO MEAS - MV V2 VTI: 21.8 CM
BH CV ECHO MEAS - MVA(P1/2T): 3.1 CM2
BH CV ECHO MEAS - MVA(VTI): 3.3 CM2
BH CV ECHO MEAS - PA ACC TIME: 0.11 SEC
BH CV ECHO MEAS - PA V2 MAX: 75.6 CM/SEC
BH CV ECHO MEAS - QP/QS: 1.3
BH CV ECHO MEAS - RAP SYSTOLE: 3 MMHG
BH CV ECHO MEAS - RV MAX PG: 1.5 MMHG
BH CV ECHO MEAS - RV V1 MAX: 61.3 CM/SEC
BH CV ECHO MEAS - RV V1 VTI: 11.8 CM
BH CV ECHO MEAS - RVOT DIAM: 3.2 CM
BH CV ECHO MEAS - RVSP: 24 MMHG
BH CV ECHO MEAS - SI(MOD-SP2): 15.6 ML/M2
BH CV ECHO MEAS - SI(MOD-SP4): 14.9 ML/M2
BH CV ECHO MEAS - SV(LVOT): 71.5 ML
BH CV ECHO MEAS - SV(MOD-SP2): 24 ML
BH CV ECHO MEAS - SV(MOD-SP4): 23 ML
BH CV ECHO MEAS - SV(RVOT): 92.6 ML
BH CV ECHO MEAS - TR MAX PG: 21.3 MMHG
BH CV ECHO MEAS - TR MAX VEL: 231 CM/SEC
BH CV ECHO MEAS RV FREE WALL STRAIN: -24.4 %
BH CV ECHO MEASUREMENTS AVERAGE E/E' RATIO: 7.65
BH CV REST NUCLEAR ISOTOPE DOSE: 10.3 MCI
BH CV STRESS BP STAGE 1: NORMAL
BH CV STRESS BP STAGE 2: NORMAL
BH CV STRESS DURATION MIN STAGE 1: 3
BH CV STRESS DURATION MIN STAGE 2: 2
BH CV STRESS DURATION SEC STAGE 1: 0
BH CV STRESS DURATION SEC STAGE 2: 20
BH CV STRESS GRADE STAGE 1: 10
BH CV STRESS GRADE STAGE 2: 12
BH CV STRESS HR STAGE 1: 118
BH CV STRESS HR STAGE 2: 128
BH CV STRESS METS STAGE 1: 4.6
BH CV STRESS METS STAGE 2: 7.1
BH CV STRESS NUCLEAR ISOTOPE DOSE: 33.7 MCI
BH CV STRESS PROTOCOL 1: NORMAL
BH CV STRESS RECOVERY BP: NORMAL MMHG
BH CV STRESS RECOVERY HR: 78 BPM
BH CV STRESS SPEED STAGE 1: 1.7
BH CV STRESS SPEED STAGE 2: 2.5
BH CV STRESS STAGE 1: 1
BH CV STRESS STAGE 2: 2
BH CV XLRA - RV BASE: 3.4 CM
BH CV XLRA - RV LENGTH: 6.5 CM
BH CV XLRA - RV MID: 2.7 CM
BH CV XLRA - TDI S': 13.3 CM/SEC
LEFT ATRIUM VOLUME INDEX: 16.4 ML/M2
LV EF NUC BP: 88 %
MAXIMAL PREDICTED HEART RATE: 146 BPM
PERCENT MAX PREDICTED HR: 88.36 %
QT INTERVAL: 363 MS
QT INTERVAL: 387 MS
QTC INTERVAL: 416 MS
QTC INTERVAL: 424 MS
SINUS: 2.6 CM
STRESS BASELINE BP: NORMAL MMHG
STRESS BASELINE HR: 74 BPM
STRESS O2 SAT REST: 95 %
STRESS PERCENT HR: 104 %
STRESS POST ESTIMATED WORKLOAD: 7.1 METS
STRESS POST EXERCISE DUR MIN: 5 MIN
STRESS POST EXERCISE DUR SEC: 20 SEC
STRESS POST O2 SAT PEAK: 95 %
STRESS POST PEAK BP: NORMAL MMHG
STRESS POST PEAK HR: 129 BPM
STRESS TARGET HR: 124 BPM

## 2023-12-27 PROCEDURE — G0378 HOSPITAL OBSERVATION PER HR: HCPCS

## 2023-12-27 PROCEDURE — 93016 CV STRESS TEST SUPVJ ONLY: CPT | Performed by: STUDENT IN AN ORGANIZED HEALTH CARE EDUCATION/TRAINING PROGRAM

## 2023-12-27 PROCEDURE — 93018 CV STRESS TEST I&R ONLY: CPT | Performed by: INTERNAL MEDICINE

## 2023-12-27 PROCEDURE — 93017 CV STRESS TEST TRACING ONLY: CPT

## 2023-12-27 PROCEDURE — 78452 HT MUSCLE IMAGE SPECT MULT: CPT

## 2023-12-27 PROCEDURE — 99204 OFFICE O/P NEW MOD 45 MIN: CPT | Performed by: STUDENT IN AN ORGANIZED HEALTH CARE EDUCATION/TRAINING PROGRAM

## 2023-12-27 PROCEDURE — 99238 HOSP IP/OBS DSCHRG MGMT 30/<: CPT | Performed by: HOSPITALIST

## 2023-12-27 PROCEDURE — A9500 TC99M SESTAMIBI: HCPCS | Performed by: HOSPITALIST

## 2023-12-27 PROCEDURE — 25810000003 SODIUM CHLORIDE 0.9 % SOLUTION: Performed by: NURSE PRACTITIONER

## 2023-12-27 PROCEDURE — 93306 TTE W/DOPPLER COMPLETE: CPT | Performed by: INTERNAL MEDICINE

## 2023-12-27 PROCEDURE — 78452 HT MUSCLE IMAGE SPECT MULT: CPT | Performed by: INTERNAL MEDICINE

## 2023-12-27 PROCEDURE — 93306 TTE W/DOPPLER COMPLETE: CPT

## 2023-12-27 PROCEDURE — 0 TECHNETIUM SESTAMIBI: Performed by: HOSPITALIST

## 2023-12-27 PROCEDURE — 93005 ELECTROCARDIOGRAM TRACING: CPT | Performed by: HOSPITALIST

## 2023-12-27 RX ORDER — PANTOPRAZOLE SODIUM 40 MG/1
40 TABLET, DELAYED RELEASE ORAL DAILY
Qty: 30 TABLET | Refills: 0 | Status: SHIPPED | OUTPATIENT
Start: 2023-12-27

## 2023-12-27 RX ADMIN — TECHNETIUM TC 99M SESTAMIBI 1 DOSE: 1 INJECTION INTRAVENOUS at 08:54

## 2023-12-27 RX ADMIN — TECHNETIUM TC 99M SESTAMIBI 1 DOSE: 1 INJECTION INTRAVENOUS at 10:16

## 2023-12-27 RX ADMIN — Medication 10 ML: at 00:32

## 2023-12-27 RX ADMIN — SODIUM CHLORIDE 100 ML/HR: 9 INJECTION, SOLUTION INTRAVENOUS at 00:32

## 2023-12-27 NOTE — CONSULTS
[facility]        Patient Name: Nikkie Kennedy  Age/Sex: 73 y.o. female  : 1950  MRN: [id number]    Date of Admission: 2023  Date of Encounter Visit: 23  Encounter Provider: Dominic Petersen MD  Referring Provider: No ref. provider found  Place of Service: [facility]  Patient Care Team:  Jim Michael MD as PCP - General  Jim Michael MD as PCP - Family Medicine    Subjective:     Chief Complaint: chest pain     Reason for consult: chest pain     History of Present Illness:  Nikkie Kennedy is a 73 y.o. female pt with a history of hypothyroidism, recent hospitalization for multiple complications and septic shock from Charlie ESS, asthma, frequent UTIs.  She had an acute episode of chest pain that occurred while she was talking on the phone.  It was a persistent, pressure-like sensation that did not remit.  It was nonexertional.  It persisted so she went to the emergency room and she received nitroglycerin which helped the pain.  We have been asked to be seen for chest pain.     Pt presented to ER on 23 with complaints of sudden onset of mild chest pressure that radiated to her left shoulder and left arm while at rest. Pt reported episode lasted about 1-2 hours and was not associated with any shortness of breath, palpitations, dizziness, lightheadedness, nausea, vomiting or diaphoresis. Pt was given 3 NTG in ER with relief . In ER, EKG showed NSR, left atrial enlargement, left anterior fascicular block, T wave abnormalities present in several leads, Pt was given ASA, Lovenox and nitropaste in ER and admitted.  Her high-sensitivity troponins were unremarkable.  The first was less than 6, the second 1 was 7.    I have been asked to see for chest pain.  She has not had pain like this before.  She has some chronic GI issues which sound more related to IBS-like symptoms but she has not been tested with a upper GI series in some  time.                    Prior Cardiac Testing:          ECHO 4/16/21    Conclusions      Summary    The left ventricular chamber size, wall thickness, and systolic function are    within normal limits.    Large left pleural effusion      Any valve disease noted in the report is non-rheumatic unless otherwise    specifically noted.     Past Medical History:  Past Medical History:   Diagnosis Date    Abnormal vaginal Pap smear 09/10/1999    ascus    Abnormal vaginal Pap smear 11/06/2000    lgsil    Arthritis     Asthma     h/o, no inhaler    Disease of thyroid gland     hypothyroid    DJD (degenerative joint disease) of knee     sched TKA left    History of colposcopy 10/11/1999    chronic endocervicitis    History of UTI     Hole in the ear drum, right     d/t tube    Osteoporosis     Pulmonary embolism     Spinal headache     Tick bite     Pt on vent 18 days, hospitalized 34 days from illness from tick       Past Surgical History:   Procedure Laterality Date    BREAST CYST ASPIRATION Right     20 yrs ago    BREAST CYST INCISION AND DRAINAGE  12/1996, 1997    CHOLECYSTECTOMY  03/2016    COLONOSCOPY      COLONOSCOPY N/A 7/27/2023    Procedure: COLONOSCOPY;  Surgeon: Evangelista Brower MD;  Location: [facility] OR;  Service: Gastroenterology;  Laterality: N/A;  Diverticulosis; External hemorrhoids    EAR TUBES      TOTAL KNEE ARTHROPLASTY Left 04/22/2019    Procedure: left total knee arthroplasty, possible placement of ON-Q pump, and all associated procedures;  Surgeon: Dhaval Quinn MD;  Location: [facility] OR;  Service: Orthopedics    TUBAL ABDOMINAL LIGATION      VARICOSE VEIN SURGERY  2015    Pt states she had varicose veins removed on both legs (2-3 years ago).       Home Medications:   Medications Prior to Admission   Medication Sig Dispense Refill Last Dose    EPINEPHrine (EPIPEN) 0.3 MG/0.3ML solution auto-injector injection Inject 0.3 mL into the appropriate muscle as directed by prescriber 1 (One) Time As  Needed (reaction).       estradiol (ESTRACE) 0.1 MG/GM vaginal cream Insert 1 g into the vagina 2 (Two) Times a Week. 42.5 g 2 Past Week    fluticasone (FLONASE) 50 MCG/ACT nasal spray    Past Month    levothyroxine (SYNTHROID, LEVOTHROID) 75 MCG tablet Take 1 tablet by mouth Daily.   12/25/2023 at 0900    meloxicam (MOBIC) 7.5 MG tablet TAKE 1 TABLET BY MOUTH DAILY. (Patient taking differently: Take 2 tablets by mouth Daily.) 90 tablet 1 12/25/2023 at 0900    tobramycin-dexamethasone (TOBRADEX) 0.3-0.1 % ophthalmic suspension 1 drop Daily As Needed. In ear   Past Month    ALLERGY SERUM INJECTION Inject  under the skin into the appropriate area as directed Every 7 (Seven) Days.   More than a month       Allergies:  Allergies   Allergen Reactions    Hydrocodone Nausea And Vomiting    Cefepime Hives    Codeine Nausea And Vomiting    Omnicef [Cefdinir] Hives    Oxycodone Nausea And Vomiting       Past Social History:  Social History     Socioeconomic History    Marital status:    Tobacco Use    Smoking status: Never    Smokeless tobacco: Never   Vaping Use    Vaping Use: Never used   Substance and Sexual Activity    Alcohol use: No    Drug use: No    Sexual activity: Not Currently     Partners: Male     Birth control/protection: Post-menopausal     Comment:        Past Family History:  Family History   Problem Relation Age of Onset    Cancer Mother     Colon cancer Mother 76    Clotting disorder Other         does not know name    Deep vein thrombosis Other     Breast cancer Neg Hx     Ovarian cancer Neg Hx     Malig Hyperthermia Neg Hx        REVIEW OF SYSTEMS:   14 point ROS was performed and is negative except as outlined in HPI          Objective:   Temp:  [97.6 °F (36.4 °C)-98.2 °F (36.8 °C)] 97.8 °F (36.6 °C)  Heart Rate:  [60-81] 64  Resp:  [16-20] 16  BP: ()/(51-84) 94/51     Intake/Output Summary (Last 24 hours) at 12/27/2023 0819  Last data filed at 12/27/2023 0521  Gross per 24 hour    Intake 240 ml   Output 850 ml   Net -610 ml     Body mass index is 22.54 kg/m².      12/26/23 1804 12/26/23 2019   Weight: 55.3 kg (122 lb) 55.9 kg (123 lb 4 oz)     Weight change:     General Appearance:    Alert, cooperative, in no acute distress, resting in bed with  at bedside   Head:    Normocephalic, without obvious abnormality, atraumatic   Eyes:            Lids and lashes normal, conjunctivae and sclerae normal, no   icterus, no pallor, corneas clear, PERRLA   Ears:    Ears appear intact with no abnormalities noted   Throat:   No oral lesions, no thrush, oral mucosa moist   Neck:   No adenopathy, supple, trachea midline, no thyromegaly, no   carotid bruit, no JVD   Back:     No kyphosis present, no scoliosis present, no skin lesions, erythema or scars, no tenderness to percussion or palpation, range of motion normal   Lungs:     Clear to auscultation,respirations regular, even and unlabored    Heart:    Regular rhythm and normal rate, normal S1 and S2, no murmur, no gallop, no rub, no click   Chest Wall:    No abnormalities observed   Abdomen:     Normal bowel sounds, no masses, no organomegaly, soft, nontender, nondistended, no guarding, no rebound  tenderness   Extremities:   Moves all extremities well, no edema, no cyanosis, no redness   Pulses:   Pulses palpable and equal bilaterally. Normal radial, carotid, femoral, dorsalis pedis and posterior tibial pulses bilaterally. Normal abdominal aorta   Skin:  Psychiatric:   No bleeding, bruising or rash    Alert and oriented x 3, normal mood and affect     Lab Review:   Results from last 7 days   Lab Units 12/26/23  1814   SODIUM mmol/L 137   POTASSIUM mmol/L 4.0   CHLORIDE mmol/L 103   CO2 mmol/L 26.8   BUN mg/dL 20   CREATININE mg/dL 0.89   GLUCOSE mg/dL 92   CALCIUM mg/dL 10.0   AST (SGOT) U/L 24   ALT (SGPT) U/L 20     Results from last 7 days   Lab Units 12/26/23 2004 12/26/23 1814   HSTROP T ng/L 7 <6     Results from last 7 days   Lab Units  "12/26/23 1814   WBC 10*3/mm3 9.71   HEMOGLOBIN g/dL 13.5   HEMATOCRIT % 41.7   PLATELETS 10*3/mm3 235             Results from last 7 days   Lab Units 12/26/23 2004   CHOLESTEROL mg/dL 195   TRIGLYCERIDES mg/dL 133   HDL CHOL mg/dL 54     Results from last 7 days   Lab Units 12/26/23 1814   PROBNP pg/mL 91.4     Results from last 7 days   Lab Units 12/26/23 2004   TSH uIU/mL 2.690       Echo EF Estimated  No results found for: \"ECHOEFEST\"    EKG:                           I personally viewed and interpreted the patient's EKG.  The EKG is unchanged from previous.  Sinus rhythm with left anterior fascicular block but no acute ischemic changes.    Imaging:  Imaging Results (Most Recent)       Procedure Component Value Units Date/Time    XR Chest 1 View [[id number]] Collected: 12/26/23 1919     Updated: 12/26/23 2000    Narrative:      Chest radiograph    INDICATION: Chest pressure    COMPARISON: 8/22/2022 radiograph    TECHNIQUE: Single frontal view of the chest.    FINDINGS: Normal cardiac silhouette. Normal mediastinum. No focal parenchymal consolidation. No pleural effusion or pneumothorax. No acute osseous abnormalities.      Impression:      No evidence of acute cardiopulmonary disease.    Signer Name: Inocente Reis MD   Signed: 12/26/2023 7:58 PM EST  [facility]                Assessment:     There are no hospital problems to display for this patient.         Plan:     Chest pain: Persistent, unremitting chest pain that responded to nitroglycerin, however high-sensitivity troponins were unremarkable on presentation.  Very unlikely to be cardiac chest pain with negative troponins.  However given the nitro responsiveness, we will proceed with stress test to delineate risk.  If stress test is unremarkable, would consider further investigations to GI source of the pain/esophageal spasm.  EKGs reviewed, unremarkable  Hospitalization for ehrlichiosis 2021: Unclear if this was related to any " residual GI issues that could be causing her pain.  Stress test per above.   History of provoked pulmonary embolism.  In the setting of illness for #2.  Symptoms do not sound consistent with PE.  Her vitals are unremarkable.  If her pain persists and other testing is negative Yeboah might need to consider D-dimer workup.    Thank you for allowing me to participate in the care of Nikkie Kennedy.  Stress testing today.  Echocardiogram reasonable.  Further workup to follow.    Addendum 1300  I reviewed the patient's stress test.  Probable apical thinning artifact noted.  Unable to exclude small area of apical ischemia, but subsequent echocardiogram normal without evidence of infarction.  Findings are consistent with apical thinning.  High-sensitivity troponins completely unremarkable.  Her presentation is consistent with noncardiac chest pain.  No further inpatient cardiac testing needed at this time.    Dominic Petersen MD  [facility]  12/27/23  06:15 EST      Part of this note may be an electronic transcription/translation of spoken language to printed text using the Dragon Dictation System.

## 2023-12-27 NOTE — ED NOTES
Nursing report ED to floor  Nikkie Kennedy  73 y.o.  female    HPI :   Chief Complaint   Patient presents with    Chest Pain     CENTER CHEST PAIN THAT RADIATES TO LEFT ARM. PRESSURE. NO CARDIAC HX. PT WAS JUST SITTING CALMLY WHEN IT STARTED.        Admitting doctor:   Magen Ballesteros MD    Admitting diagnosis:   The encounter diagnosis was Precordial pain.    Code status:   Current Code Status       Date Active Code Status Order ID Comments User Context       12/26/2023 1954 CPR (Attempt to Resuscitate) [id number]  Tiera Baltazar, [facility]        Question Answer    Code Status (Patient has no pulse and is not breathing) CPR (Attempt to Resuscitate)    Medical Interventions (Patient has pulse or is breathing) Full Support    Level Of Support Discussed With Patient                    Allergies:   Hydrocodone, Cefepime, Codeine, Omnicef [cefdinir], and Oxycodone    Isolation:   No active isolations    Intake and Output  No intake or output data in the 24 hours ending 12/26/23 1956    Weight:       12/26/23 1804   Weight: 55.3 kg (122 lb)       Most recent vitals:   Vitals:    12/26/23 1845 12/26/23 1846 12/26/23 1901 12/26/23 1939   BP: 112/71 112/71 92/57 99/65   Pulse: 79 81 73 67   Resp:   16    Temp:       TempSrc:       SpO2: 95%  96%    Weight:       Height:           Active LDAs/IV Access:   Lines, Drains & Airways       Active LDAs       Name Placement date Placement time Site Days    Peripheral IV 12/26/23 1812 Right Antecubital 12/26/23  1812  Antecubital  less than 1                    Labs (abnormal labs have a star):   Labs Reviewed   CBC WITH AUTO DIFFERENTIAL - Abnormal; Notable for the following components:       Result Value    Lymphocytes, Absolute 4.28 (*)     All other components within normal limits   TROPONIN - Normal    Narrative:     High Sensitive Troponin T Reference Range:  <14.0 ng/L- Negative Female for AMI  <22.0 ng/L- Negative Male for AMI  >=14 - Abnormal Female indicating possible  myocardial injury.  >=22 - Abnormal Male indicating possible myocardial injury.   Clinicians would have to utilize clinical acumen, EKG, Troponin, and serial changes to determine if it is an Acute Myocardial Infarction or myocardial injury due to an underlying chronic condition.        BNP (IN-HOUSE) - Normal    Narrative:     This assay is used as an aid in the diagnosis of individuals suspected of having heart failure. It can be used as an aid in the diagnosis of acute decompensated heart failure (ADHF) in patients presenting with signs and symptoms of ADHF to the emergency department (ED). In addition, NT-proBNP of <300 pg/mL indicates ADHF is not likely.    Age Range Result Interpretation  NT-proBNP Concentration (pg/mL:      <50             Positive            >450                   Gray                 300-450                    Negative             <300    50-75           Positive            >900                  Gray                300-900                  Negative            <300      >75             Positive            >1800                  Gray                300-1800                  Negative            <300   COMPREHENSIVE METABOLIC PANEL    Narrative:     GFR Normal >60  Chronic Kidney Disease <60  Kidney Failure <15    The GFR formula is only valid for adults with stable renal function between ages 18 and 70.   HIGH SENSITIVITIY TROPONIN T 2HR   CBC AND DIFFERENTIAL    Narrative:     The following orders were created for panel order CBC & Differential.  Procedure                               Abnormality         Status                     ---------                               -----------         ------                     CBC Auto Differential[[id number]]        Abnormal            Final result                 Please view results for these tests on the individual orders.       Results       Procedure Component Value Ref Range Date/Time    BNP [[id number]]  (Normal) Collected: 12/26/23 1814    Order  Status: Completed Specimen: Blood Updated: 12/26/23 1852     proBNP 91.4 0.0 - 900.0 pg/mL     Narrative:      This assay is used as an aid in the diagnosis of individuals suspected of having heart failure. It can be used as an aid in the diagnosis of acute decompensated heart failure (ADHF) in patients presenting with signs and symptoms of ADHF to the emergency department (ED). In addition, NT-proBNP of <300 pg/mL indicates ADHF is not likely.    Age Range Result Interpretation  NT-proBNP Concentration (pg/mL:      <50             Positive            >450                   Gray                 300-450                    Negative             <300    50-75           Positive            >900                  Gray                300-900                  Negative            <300      >75             Positive            >1800                  Gray                300-1800                  Negative            <300    High Sensitivity Troponin T 2Hr [[id number]]     Order Status: Sent Specimen: Blood     High Sensitivity Troponin T [[id number]]  (Normal) Collected: 12/26/23 1814    Order Status: Completed Specimen: Blood Updated: 12/26/23 1846     HS Troponin T <6 <14 ng/L     Narrative:      High Sensitive Troponin T Reference Range:  <14.0 ng/L- Negative Female for AMI  <22.0 ng/L- Negative Male for AMI  >=14 - Abnormal Female indicating possible myocardial injury.  >=22 - Abnormal Male indicating possible myocardial injury.   Clinicians would have to utilize clinical acumen, EKG, Troponin, and serial changes to determine if it is an Acute Myocardial Infarction or myocardial injury due to an underlying chronic condition.         Comprehensive Metabolic Panel [[id number]] Collected: 12/26/23 1814    Order Status: Completed Specimen: Blood Updated: 12/26/23 1842     Glucose 92 65 - 99 mg/dL      BUN 20 8 - 23 mg/dL      Creatinine 0.89 0.57 - 1.00 mg/dL      Sodium 137 136 - 145 mmol/L      Potassium 4.0 3.5 - 5.2 mmol/L       Chloride 103 98 - 107 mmol/L      CO2 26.8 22.0 - 29.0 mmol/L      Calcium 10.0 8.6 - 10.5 mg/dL      Total Protein 6.9 6.0 - 8.5 g/dL      Albumin 4.7 3.5 - 5.2 g/dL      ALT (SGPT) 20 1 - 33 U/L      AST (SGOT) 24 1 - 32 U/L      Alkaline Phosphatase 105 39 - 117 U/L      Total Bilirubin 0.3 0.0 - 1.2 mg/dL      Globulin 2.2 gm/dL      A/G Ratio 2.1 g/dL      BUN/Creatinine Ratio 22.5 7.0 - 25.0      Anion Gap 7.2 5.0 - 15.0 mmol/L      eGFR 68.1 >60.0 mL/min/1.73     Narrative:      GFR Normal >60  Chronic Kidney Disease <60  Kidney Failure <15    The GFR formula is only valid for adults with stable renal function between ages 18 and 70.    CBC Auto Differential [[id number]]  (Abnormal) Collected: 12/26/23 1814    Order Status: Completed Specimen: Blood Updated: 12/26/23 1822     WBC 9.71 3.40 - 10.80 10*3/mm3      RBC 4.47 3.77 - 5.28 10*6/mm3      Hemoglobin 13.5 12.0 - 15.9 g/dL      Hematocrit 41.7 34.0 - 46.6 %      MCV 93.3 79.0 - 97.0 fL      MCH 30.2 26.6 - 33.0 pg      MCHC 32.4 31.5 - 35.7 g/dL      RDW 13.6 12.3 - 15.4 %      RDW-SD 46.4 37.0 - 54.0 fl      MPV 10.1 6.0 - 12.0 fL      Platelets 235 140 - 450 10*3/mm3      Neutrophil % 45.4 42.7 - 76.0 %      Lymphocyte % 44.1 19.6 - 45.3 %      Monocyte % 7.6 5.0 - 12.0 %      Eosinophil % 1.9 0.3 - 6.2 %      Basophil % 0.6 0.0 - 1.5 %      Immature Grans % 0.4 0.0 - 0.5 %      Neutrophils, Absolute 4.41 1.70 - 7.00 10*3/mm3      Lymphocytes, Absolute 4.28 0.70 - 3.10 10*3/mm3      Monocytes, Absolute 0.74 0.10 - 0.90 10*3/mm3      Eosinophils, Absolute 0.18 0.00 - 0.40 10*3/mm3      Basophils, Absolute 0.06 0.00 - 0.20 10*3/mm3      Immature Grans, Absolute 0.04 0.00 - 0.05 10*3/mm3      nRBC 0.0 0.0 - 0.2 /100 WBC              EKG:   ECG 12 Lead Chest Pain   Preliminary Result   HEART RATE= 79  bpm   RR Interval= 760  ms   SC Interval= 153  ms   P Horizontal Axis= -2  deg   P Front Axis= 56  deg   QRSD Interval= 83  ms   QT Interval= 363  ms   QTcB=  416  ms   QRS Axis= -43  deg   T Wave Axis= 22  deg   - ABNORMAL ECG -   Sinus rhythm   Probable left atrial enlargement   Left anterior fascicular block   Electronically Signed By:    Date and Time of Study: 2023-12-26 18:06:41          Meds given in ED:   Medications   sodium chloride 0.9 % flush 10 mL (has no administration in time range)   aspirin chewable tablet 324 mg (324 mg Oral Given 12/26/23 1819)   nitroglycerin (NITROSTAT) SL tablet 0.4 mg (0.4 mg Sublingual Given 12/26/23 1846)   nitroglycerin (NITROSTAT) ointment 1 inch (1 inch Topical Given 12/26/23 1939)   Enoxaparin Sodium (LOVENOX) syringe 60 mg (60 mg Subcutaneous Given 12/26/23 1940)       Imaging results:  No radiology results for the last day    Ambulatory status:    independent    Social issues:   Social History     Socioeconomic History    Marital status:    Tobacco Use    Smoking status: Never    Smokeless tobacco: Never   Vaping Use    Vaping Use: Never used   Substance and Sexual Activity    Alcohol use: No    Drug use: No    Sexual activity: Not Currently     Partners: Male     Birth control/protection: Post-menopausal     Comment:        NIH Stroke Scale:         Izabela Marcus RN  12/26/23 19:56 EST

## 2023-12-27 NOTE — DISCHARGE SUMMARY
Nikkie Kennedy  1950  [id number]    Hospitalists Discharge Summary    Date of Admission: 12/26/2023  Date of Discharge:  12/27/2023    Primary Discharge Diagnoses:  Atypical Chest Pain    Secondary Discharge Diagnoses:  Hypothyroidism    History of Present Illness (taken from H&P):  Patient is a 73-year-old female who presented to the emergency department secondary to sudden onset mild chest pressure that radiated to her left shoulder and left arm while at rest.  She notes total episode was approximately 1-1/2 to 2 hours long and was not associated with any shortness of air, palpitations, dizziness, lightheadedness, nausea, vomiting, diaphoresis or any other symptoms.  In ER pain was completely relieved with 3 nitroglycerin tablets.  ER provider contacted Dr. Maria with cardiology who requested observation admission overnight.  She has already received aspirin, Lovenox, Nitropaste     Diagnostics in ER were generally unrevealing     She has a history of hypothyroidism, asthma, frequent UTIs, allergies, tickborne illness     She otherwise currently denies f/c/headache/rhinorrhea/nasal congestion/lightheadedness/syncopal sensation/cough/soa/n/v/d/chest pain/abdominal pain/recent illness/sick exposures/change in bowel or bladder habits/no weight change/bloody emesis or bloody stools/change in medications or any other new concerns.    Hospital Course:  Admitted for chest pain.  Work-up negative.  Cardiology recommended GI follow-up.  Discussed Mobic and NSAID use.    PCP  Patient Care Team:  Jim Michael MD as PCP - General  Jim Michael MD as PCP - Family Medicine    Consults:   Consults       Date and Time Order Name Status Description    12/26/2023  8:23 PM Inpatient Cardiology Consult Completed             Operations and Procedures Performed:       Stress Test With Myocardial Perfusion One Day    Result Date: 12/27/2023  Narrative:   Myocardial perfusion imaging indicates an irrversible  perfusion defect in the apex. Cannot completely exclude a very small area of ischemia located in the apical anterior wall.   Impressions are consistent with an intermediate risk study.   Left ventricular ejection fraction is hyperdynamic (Calculated EF > 70%).   Findings consistent with a normal ECG stress test.     Adult Transthoracic Echo Complete w/ Color, Spectral and Contrast if necessary per protocol    Result Date: 12/27/2023  Narrative:   Left ventricular systolic function is normal. Calculated left ventricular EF = 67%   Left ventricular wall thickness is consistent with hypertrophy. Sigmoid-shaped ventricular septum is present.   Left ventricular diastolic function was normal.   Moderate tricuspid valve regurgitation is present.   Estimated right ventricular systolic pressure from tricuspid regurgitation is normal (<35 mmHg). Calculated right ventricular systolic pressure from tricuspid regurgitation is 24 mmHg.     XR Chest 1 View    Result Date: 12/26/2023  Narrative: Chest radiograph INDICATION: Chest pressure COMPARISON: 8/22/2022 radiograph TECHNIQUE: Single frontal view of the chest. FINDINGS: Normal cardiac silhouette. Normal mediastinum. No focal parenchymal consolidation. No pleural effusion or pneumothorax. No acute osseous abnormalities.     Impression: No evidence of acute cardiopulmonary disease. Signer Name: Inocente Reis MD Signed: 12/26/2023 7:58 PM EST [facility]     Allergies:  is allergic to hydrocodone, cefepime, codeine, omnicef [cefdinir], and oxycodone.      Discharge Medications:     Discharge Medications        New Medications        Instructions Start Date   pantoprazole 40 MG EC tablet  Commonly known as: Protonix   40 mg, Oral, Daily             Continue These Medications        Instructions Start Date   ALLERGY SERUM INJECTION   Subcutaneous, Every 7 Days      EPINEPHrine 0.3 MG/0.3ML solution auto-injector injection  Commonly known as: EPIPEN   1  each, Intramuscular, Once As Needed      estradiol 0.1 MG/GM vaginal cream  Commonly known as: ESTRACE   1 g, Vaginal, 2 Times Weekly      fluticasone 50 MCG/ACT nasal spray  Commonly known as: FLONASE       levothyroxine 75 MCG tablet  Commonly known as: SYNTHROID, LEVOTHROID   75 mcg, Oral, Daily      meloxicam 7.5 MG tablet  Commonly known as: MOBIC   7.5 mg, Oral, Daily      tobramycin-dexAMETHasone 0.3-0.1 % ophthalmic suspension  Commonly known as: TOBRADEX   1 drop, Daily PRN, In ear               Last Lab Results:   Lab Results (most recent)       Procedure Component Value Units Date/Time    TSH [[id number]]  (Normal) Collected: 12/26/23 2004    Specimen: Blood Updated: 12/26/23 2054     TSH 2.690 uIU/mL     Lipid Panel [[id number]]  (Abnormal) Collected: 12/26/23 2004    Specimen: Blood Updated: 12/26/23 2045     Total Cholesterol 195 mg/dL      Triglycerides 133 mg/dL      HDL Cholesterol 54 mg/dL      LDL Cholesterol  117 mg/dL      VLDL Cholesterol 24 mg/dL      LDL/HDL Ratio 2.12    Narrative:      Cholesterol Reference Ranges  (U.S. Department of Health and Human Services ATP III Classifications)    Desirable          <200 mg/dL  Borderline High    200-239 mg/dL  High Risk          >240 mg/dL      Triglyceride Reference Ranges  (U.S. Department of Health and Human Services ATP III Classifications)    Normal           <150 mg/dL  Borderline High  150-199 mg/dL  High             200-499 mg/dL  Very High        >500 mg/dL    HDL Reference Ranges  (U.S. Department of Health and Human Services ATP III Classifications)    Low     <40 mg/dl (major risk factor for CHD)  High    >60 mg/dl ('negative' risk factor for CHD)        LDL Reference Ranges  (U.S. Department of Health and Human Services ATP III Classifications)    Optimal          <100 mg/dL  Near Optimal     100-129 mg/dL  Borderline High  130-159 mg/dL  High             160-189 mg/dL  Very High        >189 mg/dL    Hemoglobin A1c [[id number]]  (Normal)  Collected: 12/26/23 1814    Specimen: Blood Updated: 12/26/23 2040     Hemoglobin A1C 5.40 %     Narrative:      Hemoglobin A1C Ranges:    Increased Risk for Diabetes  5.7% to 6.4%  Diabetes                     >= 6.5%  Diabetic Goal                < 7.0%    High Sensitivity Troponin T 2Hr [[id number]] Collected: 12/26/23 2004    Specimen: Blood Updated: 12/26/23 2028     HS Troponin T 7 ng/L      Troponin T Delta --     Comment: Unable to calculate.       Narrative:      High Sensitive Troponin T Reference Range:  <14.0 ng/L- Negative Female for AMI  <22.0 ng/L- Negative Male for AMI  >=14 - Abnormal Female indicating possible myocardial injury.  >=22 - Abnormal Male indicating possible myocardial injury.   Clinicians would have to utilize clinical acumen, EKG, Troponin, and serial changes to determine if it is an Acute Myocardial Infarction or myocardial injury due to an underlying chronic condition.         BNP [[id number]]  (Normal) Collected: 12/26/23 1814    Specimen: Blood Updated: 12/26/23 1852     proBNP 91.4 pg/mL     Narrative:      This assay is used as an aid in the diagnosis of individuals suspected of having heart failure. It can be used as an aid in the diagnosis of acute decompensated heart failure (ADHF) in patients presenting with signs and symptoms of ADHF to the emergency department (ED). In addition, NT-proBNP of <300 pg/mL indicates ADHF is not likely.    Age Range Result Interpretation  NT-proBNP Concentration (pg/mL:      <50             Positive            >450                   Gray                 300-450                    Negative             <300    50-75           Positive            >900                  Gray                300-900                  Negative            <300      >75             Positive            >1800                  Gray                300-1800                  Negative            <300    High Sensitivity Troponin T [[id number]]  (Normal) Collected: 12/26/23 1814     Specimen: Blood Updated: 12/26/23 1846     HS Troponin T <6 ng/L     Narrative:      High Sensitive Troponin T Reference Range:  <14.0 ng/L- Negative Female for AMI  <22.0 ng/L- Negative Male for AMI  >=14 - Abnormal Female indicating possible myocardial injury.  >=22 - Abnormal Male indicating possible myocardial injury.   Clinicians would have to utilize clinical acumen, EKG, Troponin, and serial changes to determine if it is an Acute Myocardial Infarction or myocardial injury due to an underlying chronic condition.         Comprehensive Metabolic Panel [[id number]] Collected: 12/26/23 1814    Specimen: Blood Updated: 12/26/23 1842     Glucose 92 mg/dL      BUN 20 mg/dL      Creatinine 0.89 mg/dL      Sodium 137 mmol/L      Potassium 4.0 mmol/L      Chloride 103 mmol/L      CO2 26.8 mmol/L      Calcium 10.0 mg/dL      Total Protein 6.9 g/dL      Albumin 4.7 g/dL      ALT (SGPT) 20 U/L      AST (SGOT) 24 U/L      Alkaline Phosphatase 105 U/L      Total Bilirubin 0.3 mg/dL      Globulin 2.2 gm/dL      A/G Ratio 2.1 g/dL      BUN/Creatinine Ratio 22.5     Anion Gap 7.2 mmol/L      eGFR 68.1 mL/min/1.73     Narrative:      GFR Normal >60  Chronic Kidney Disease <60  Kidney Failure <15    The GFR formula is only valid for adults with stable renal function between ages 18 and 70.    CBC & Differential [[id number]]  (Abnormal) Collected: 12/26/23 1814    Specimen: Blood Updated: 12/26/23 1822    Narrative:      The following orders were created for panel order CBC & Differential.  Procedure                               Abnormality         Status                     ---------                               -----------         ------                     CBC Auto Differential[[id number]]        Abnormal            Final result                 Please view results for these tests on the individual orders.    CBC Auto Differential [[id number]]  (Abnormal) Collected: 12/26/23 1814    Specimen: Blood Updated: 12/26/23 1822     WBC  9.71 10*3/mm3      RBC 4.47 10*6/mm3      Hemoglobin 13.5 g/dL      Hematocrit 41.7 %      MCV 93.3 fL      MCH 30.2 pg      MCHC 32.4 g/dL      RDW 13.6 %      RDW-SD 46.4 fl      MPV 10.1 fL      Platelets 235 10*3/mm3      Neutrophil % 45.4 %      Lymphocyte % 44.1 %      Monocyte % 7.6 %      Eosinophil % 1.9 %      Basophil % 0.6 %      Immature Grans % 0.4 %      Neutrophils, Absolute 4.41 10*3/mm3      Lymphocytes, Absolute 4.28 10*3/mm3      Monocytes, Absolute 0.74 10*3/mm3      Eosinophils, Absolute 0.18 10*3/mm3      Basophils, Absolute 0.06 10*3/mm3      Immature Grans, Absolute 0.04 10*3/mm3      nRBC 0.0 /100 WBC           Imaging Results (Most Recent)       Procedure Component Value Units Date/Time    XR Chest 1 View [[id number]] Collected: 12/26/23 1919     Updated: 12/26/23 2000    Narrative:      Chest radiograph    INDICATION: Chest pressure    COMPARISON: 8/22/2022 radiograph    TECHNIQUE: Single frontal view of the chest.    FINDINGS: Normal cardiac silhouette. Normal mediastinum. No focal parenchymal consolidation. No pleural effusion or pneumothorax. No acute osseous abnormalities.      Impression:      No evidence of acute cardiopulmonary disease.    Signer Name: Inocente Reis MD   Signed: 12/26/2023 7:58 PM EST  [facility]            PROCEDURES      Condition on Discharge: Stable    Physical Exam at Discharge  Vital Signs  Temp:  [97.6 °F (36.4 °C)-98.2 °F (36.8 °C)] 98.1 °F (36.7 °C)  Heart Rate:  [60-81] 68  Resp:  [16-20] 16  BP: ()/(51-84) 97/54    Physical Exam:  Physical Exam   Constitutional: Patient appears well-developed and well-nourished and in no acute distress   Cardiovascular: Regular rate, regular rhythm, S1 normal and S2 normal.  Exam reveals no gallop and no friction rub.  No murmur heard.  Pulmonary/Chest: Lungs are clear to auscultation bilaterally. No respiratory distress. No wheezes. No rhonchi. No rales.   Abdominal: Soft. Bowel sounds are  normal. There is no tenderness.   Musculoskeletal: Normal Muscle tone  Extremities: No edema.   Neurological: Patient is alert and oriented to person, place, and time. Cranial nerves II-XII are grossly intact with no focal deficits.    Discharge Disposition  Home    Visiting Nurse:    No     Home PT/OT:  No     Home Safety Evaluation:  No     DME  None    Discharge Diet:      Dietary Orders (From admission, onward)       Start     Ordered    12/27/23 1247  Diet: Cardiac Diets; Healthy Heart (2-3 Na+); Texture: Regular Texture (IDDSI 7); Fluid Consistency: Thin (IDDSI 0)  Diet Effective Now        References:    Diet Order [facility]   Question Answer Comment   Diets: Cardiac Diets    Cardiac Diet: Healthy Heart (2-3 Na+)    Texture: Regular Texture (IDDSI 7)    Fluid Consistency: Thin (IDDSI 0)        12/27/23 1246                    Activity at Discharge:  As tolerated    Follow-up Appointments  No future appointments.  Additional Instructions for the Follow-ups that You Need to Schedule       Discharge Follow-up with PCP   As directed       Currently Documented PCP:    Jim Michael MD    PCP Phone Number:    [phone]     Follow Up Details: Next week        Discharge Follow-up with Specified Provider: Dr. Alcaraz; 2 Weeks   As directed      To: Dr. Alcaraz   Follow Up: 2 Weeks                Test Results Pending at Discharge  None     Magen Ballesteros MD  12/27/23  13:52 EST    Time: <30 minutes

## 2023-12-27 NOTE — H&P
[facility] HOSPITALIST     Jim Michael MD    CHIEF COMPLAINT: chest pain    HISTORY OF PRESENT ILLNESS:  Patient is a 73-year-old female who presented to the emergency department secondary to sudden onset mild chest pressure that radiated to her left shoulder and left arm while at rest.  She notes total episode was approximately 1-1/2 to 2 hours long and was not associated with any shortness of air, palpitations, dizziness, lightheadedness, nausea, vomiting, diaphoresis or any other symptoms.  In ER pain was completely relieved with 3 nitroglycerin tablets.  ER provider contacted Dr. Maria with cardiology who requested observation admission overnight.  She has already received aspirin, Lovenox, Nitropaste    Diagnostics in ER were generally unrevealing    She has a history of hypothyroidism, asthma, frequent UTIs, allergies, tickborne illness    She otherwise currently denies f/c/headache/rhinorrhea/nasal congestion/lightheadedness/syncopal sensation/cough/soa/n/v/d/chest pain/abdominal pain/recent illness/sick exposures/change in bowel or bladder habits/no weight change/bloody emesis or bloody stools/change in medications or any other new concerns.    Past Medical History:   Diagnosis Date    Abnormal vaginal Pap smear 09/10/1999    ascus    Abnormal vaginal Pap smear 11/06/2000    lgsil    Arthritis     Asthma     h/o, no inhaler    Disease of thyroid gland     hypothyroid    DJD (degenerative joint disease) of knee     sched TKA left    History of colposcopy 10/11/1999    chronic endocervicitis    History of UTI     Hole in the ear drum, right     d/t tube    Osteoporosis     Pulmonary embolism     Spinal headache     Tick bite     Pt on vent 18 days, hospitalized 34 days from illness from tick     Past Surgical History:   Procedure Laterality Date    BREAST CYST ASPIRATION Right     20 yrs ago    BREAST CYST INCISION AND DRAINAGE  12/1996, 1997    CHOLECYSTECTOMY  03/2016     COLONOSCOPY      COLONOSCOPY N/A 7/27/2023    Procedure: COLONOSCOPY;  Surgeon: Evangelista Brower MD;  Location:  [facility] OR;  Service: Gastroenterology;  Laterality: N/A;  Diverticulosis; External hemorrhoids    EAR TUBES      TOTAL KNEE ARTHROPLASTY Left 04/22/2019    Procedure: left total knee arthroplasty, possible placement of ON-Q pump, and all associated procedures;  Surgeon: Dhaval Quinn MD;  Location:  [facility] OR;  Service: Orthopedics    TUBAL ABDOMINAL LIGATION      VARICOSE VEIN SURGERY  2015    Pt states she had varicose veins removed on both legs (2-3 years ago).     Family History   Problem Relation Age of Onset    Cancer Mother     Colon cancer Mother 76    Clotting disorder Other         does not know name    Deep vein thrombosis Other     Breast cancer Neg Hx     Ovarian cancer Neg Hx     Malig Hyperthermia Neg Hx      Social History     Tobacco Use    Smoking status: Never    Smokeless tobacco: Never   Vaping Use    Vaping Use: Never used   Substance Use Topics    Alcohol use: No    Drug use: No     Medications Prior to Admission   Medication Sig Dispense Refill Last Dose    EPINEPHrine (EPIPEN) 0.3 MG/0.3ML solution auto-injector injection Inject 0.3 mL into the appropriate muscle as directed by prescriber 1 (One) Time As Needed (reaction).       estradiol (ESTRACE) 0.1 MG/GM vaginal cream Insert 1 g into the vagina 2 (Two) Times a Week. 42.5 g 2 Past Week    fluticasone (FLONASE) 50 MCG/ACT nasal spray    Past Month    levothyroxine (SYNTHROID, LEVOTHROID) 75 MCG tablet Take 1 tablet by mouth Daily.   12/25/2023 at 0900    meloxicam (MOBIC) 7.5 MG tablet TAKE 1 TABLET BY MOUTH DAILY. (Patient taking differently: Take 2 tablets by mouth Daily.) 90 tablet 1 12/25/2023 at 0900    tobramycin-dexamethasone (TOBRADEX) 0.3-0.1 % ophthalmic suspension 1 drop Daily As Needed. In ear   Past Month    ALLERGY SERUM INJECTION Inject  under the skin into the appropriate area as directed Every 7 (Seven)  "Days.   More than a month     Allergies:  Hydrocodone, Cefepime, Codeine, Omnicef [cefdinir], and Oxycodone    Immunization History   Administered Date(s) Administered    31-influenza Vac Quardvalent Preservativ 10/12/2022    COVID-19 (MODERNA) 1st,2nd,3rd Dose Monovalent 01/12/2021, 02/09/2021, 10/23/2021    COVID-19 (MODERNA) Monovalent Original Booster 05/24/2022    COVID-19 (UNSPECIFIED) 02/10/2021    Fluzone (or Fluarix & Flulaval for VFC) >6mos 10/14/2019    Fluzone Quad >6mos (Multi-dose) 09/30/2020    Hepatitis A 02/26/2019    Shingrix 10/14/2019, 11/01/2019, 02/24/2020       REVIEW OF SYSTEMS:  Please see the above history of present illness for pertinent positives and negatives.  The remainder of the patient's systems have been reviewed and are negative.     Vital Signs  Temp:  [97.7 °F (36.5 °C)-98.2 °F (36.8 °C)] 97.7 °F (36.5 °C)  Heart Rate:  [60-81] 67  Resp:  [16-20] 20  BP: ()/(57-84) 103/63  Body mass index is 22.31 kg/m².    Flowsheet Rows      Flowsheet Row First Filed Value   Admission Height 157.5 cm (62\") Documented at 12/26/2023 1804   Admission Weight 55.3 kg (122 lb) Documented at 12/26/2023 1804               Physical Exam  Vitals reviewed.   Constitutional:       General: She is not in acute distress.     Appearance: Normal appearance. She is not ill-appearing.   HENT:      Head: Normocephalic and atraumatic.      Mouth/Throat:      Mouth: Mucous membranes are moist.   Eyes:      Extraocular Movements: Extraocular movements intact.      Pupils: Pupils are equal, round, and reactive to light.   Cardiovascular:      Rate and Rhythm: Normal rate and regular rhythm.   Pulmonary:      Effort: Pulmonary effort is normal. No respiratory distress.      Breath sounds: Normal breath sounds. No wheezing or rales.   Abdominal:      General: Abdomen is flat. Bowel sounds are normal. There is no distension.      Palpations: Abdomen is soft.      Tenderness: There is no abdominal tenderness. There " is no guarding.   Musculoskeletal:         General: No swelling.   Skin:     General: Skin is warm and dry.      Capillary Refill: Capillary refill takes less than 2 seconds.      Findings: No erythema.   Neurological:      General: No focal deficit present.      Mental Status: She is alert and oriented to person, place, and time.   Psychiatric:         Mood and Affect: Mood normal.         Behavior: Behavior normal.       Emotional Behavior:    Judgment and Insight: Good   Mental Status:  Alertness alert   Memory: Good   Mood and Affect:         Depression none               Anxiety none    Debilities:   Physical Weakness none obvious   Handicaps none known   Disabilities none known   Agitation none     Results Review:    I reviewed the patient's new clinical results.  Lab Results (most recent)       Procedure Component Value Units Date/Time    BNP [[id number]]  (Normal) Collected: 12/26/23 1814    Specimen: Blood Updated: 12/26/23 1852     proBNP 91.4 pg/mL     Narrative:      This assay is used as an aid in the diagnosis of individuals suspected of having heart failure. It can be used as an aid in the diagnosis of acute decompensated heart failure (ADHF) in patients presenting with signs and symptoms of ADHF to the emergency department (ED). In addition, NT-proBNP of <300 pg/mL indicates ADHF is not likely.    Age Range Result Interpretation  NT-proBNP Concentration (pg/mL:      <50             Positive            >450                   Gray                 300-450                    Negative             <300    50-75           Positive            >900                  Gray                300-900                  Negative            <300      >75             Positive            >1800                  Gray                300-1800                  Negative            <300    High Sensitivity Troponin T [[id number]]  (Normal) Collected: 12/26/23 1814    Specimen: Blood Updated: 12/26/23 1846     HS Troponin T <6 ng/L      Narrative:      High Sensitive Troponin T Reference Range:  <14.0 ng/L- Negative Female for AMI  <22.0 ng/L- Negative Male for AMI  >=14 - Abnormal Female indicating possible myocardial injury.  >=22 - Abnormal Male indicating possible myocardial injury.   Clinicians would have to utilize clinical acumen, EKG, Troponin, and serial changes to determine if it is an Acute Myocardial Infarction or myocardial injury due to an underlying chronic condition.         Comprehensive Metabolic Panel [[id number]] Collected: 12/26/23 1814    Specimen: Blood Updated: 12/26/23 1842     Glucose 92 mg/dL      BUN 20 mg/dL      Creatinine 0.89 mg/dL      Sodium 137 mmol/L      Potassium 4.0 mmol/L      Chloride 103 mmol/L      CO2 26.8 mmol/L      Calcium 10.0 mg/dL      Total Protein 6.9 g/dL      Albumin 4.7 g/dL      ALT (SGPT) 20 U/L      AST (SGOT) 24 U/L      Alkaline Phosphatase 105 U/L      Total Bilirubin 0.3 mg/dL      Globulin 2.2 gm/dL      A/G Ratio 2.1 g/dL      BUN/Creatinine Ratio 22.5     Anion Gap 7.2 mmol/L      eGFR 68.1 mL/min/1.73     Narrative:      GFR Normal >60  Chronic Kidney Disease <60  Kidney Failure <15    The GFR formula is only valid for adults with stable renal function between ages 18 and 70.    CBC & Differential [[id number]]  (Abnormal) Collected: 12/26/23 1814    Specimen: Blood Updated: 12/26/23 1822    Narrative:      The following orders were created for panel order CBC & Differential.  Procedure                               Abnormality         Status                     ---------                               -----------         ------                     CBC Auto Differential[[id number]]        Abnormal            Final result                 Please view results for these tests on the individual orders.    CBC Auto Differential [[id number]]  (Abnormal) Collected: 12/26/23 1814    Specimen: Blood Updated: 12/26/23 1822     WBC 9.71 10*3/mm3      RBC 4.47 10*6/mm3      Hemoglobin 13.5 g/dL       Hematocrit 41.7 %      MCV 93.3 fL      MCH 30.2 pg      MCHC 32.4 g/dL      RDW 13.6 %      RDW-SD 46.4 fl      MPV 10.1 fL      Platelets 235 10*3/mm3      Neutrophil % 45.4 %      Lymphocyte % 44.1 %      Monocyte % 7.6 %      Eosinophil % 1.9 %      Basophil % 0.6 %      Immature Grans % 0.4 %      Neutrophils, Absolute 4.41 10*3/mm3      Lymphocytes, Absolute 4.28 10*3/mm3      Monocytes, Absolute 0.74 10*3/mm3      Eosinophils, Absolute 0.18 10*3/mm3      Basophils, Absolute 0.06 10*3/mm3      Immature Grans, Absolute 0.04 10*3/mm3      nRBC 0.0 /100 WBC             Imaging Results (Most Recent)       Procedure Component Value Units Date/Time    XR Chest 1 View [[id number]] Collected: 12/26/23 1919     Updated: 12/26/23 2000    Narrative:      Chest radiograph    INDICATION: Chest pressure    COMPARISON: 8/22/2022 radiograph    TECHNIQUE: Single frontal view of the chest.    FINDINGS: Normal cardiac silhouette. Normal mediastinum. No focal parenchymal consolidation. No pleural effusion or pneumothorax. No acute osseous abnormalities.      Impression:      No evidence of acute cardiopulmonary disease.    Signer Name: Inocente Reis MD   Signed: 12/26/2023 7:58 PM EST  [facility]          Reviewed    ECG/EMG Results (most recent)       Procedure Component Value Units Date/Time    ECG 12 Lead Chest Pain [[id number]] Collected: 12/26/23 1806     Updated: 12/26/23 1807     QT Interval 363 ms      QTC Interval 416 ms     Narrative:      HEART RATE= 79  bpm  RR Interval= 760  ms  WI Interval= 153  ms  P Horizontal Axis= -2  deg  P Front Axis= 56  deg  QRSD Interval= 83  ms  QT Interval= 363  ms  QTcB= 416  ms  QRS Axis= -43  deg  T Wave Axis= 22  deg  - ABNORMAL ECG -  Sinus rhythm  Probable left atrial enlargement  Left anterior fascicular block  Electronically Signed By:   Date and Time of Study: 2023-12-26 18:06:41          reviewed    Assessment & Plan   Chest pain: Consult  "cardiology  Troponins x 2 negative  Monitor on telemetry  Check echo  N.p.o. at midnight in case of further testing in a.m.  Await further cardiology input    Hypothyroidism: TSH normal, add home levothyroxine    History of asthma: nothing acute currently    History of allergies: nothing acute currently    History of tickborne illness: nothing acute currently    I discussed the patient's findings and my recommendations with patient and staff.     Tiera Baltazar, APRN  12/26/23  21:49 EST    \"Dictated utilizing Dragon dictation\"    Note disclaimer: At [facility], we believe that sharing information builds trust and better relationships. You are receiving this note because you recently visited [facility]. It is possible you will see health information before a provider has talked with you about it. This kind of information can be easy to misunderstand. To help you fully understand what it means for your health, we urge you to discuss this note with your provider.        "

## 2023-12-27 NOTE — DISCHARGE INSTR - APPOINTMENTS
Patient's insurance is out of network with Dr. Alcaraz's office. If patient still wants to be seen in the office they can call and make a hospital follow up with 2 weeks of discharge.     Patient has follow up with Dr. Michael on January 5 @ 10:10 am [phone]

## 2023-12-27 NOTE — CASE MANAGEMENT/SOCIAL WORK
Continued Stay Note  VIVIANA Fried     Patient Name: Nikkie Kennedy  MRN: [id number]  Today's Date: 12/27/2023    Admit Date: 12/26/2023    Plan: plan home with    Discharge Plan       Row Name 12/27/23 1154       Plan    Plan plan home with     Patient/Family in Agreement with Plan yes    Plan Comments Spoke with patient at bedside, permission to speak with  present. Face sheet verified.MUÑOZ explained, signed and copy provided. Patient lives in a home with her  and is independent of ADLs including driving. She uses no DME/HH/Rehab services. She has a living will. She sees Dr Michael as PCP. She uses [facility] and denies issues obtaining medications. She will use [facility] retail pharmacy for this admission. She does not anticipate any needs at [place] and will return home with her  to assist as needed.                   Discharge Codes    No documentation.                       Amando Garcia RN

## 2023-12-27 NOTE — PLAN OF CARE
Goal Outcome Evaluation:  Plan of Care Reviewed With: patient        Progress: improving  Outcome Evaluation: New ER admit, VSS, NPO this AM awaiting Cardiology to see and anticipates possible stress test

## 2023-12-28 ENCOUNTER — READMISSION MANAGEMENT (OUTPATIENT)
Dept: CALL CENTER | Facility: HOSPITAL | Age: 73
End: 2023-12-28
Payer: MEDICARE

## 2023-12-28 NOTE — CASE MANAGEMENT/SOCIAL WORK
Case Management Discharge Note      Final Note: dc home         Selected Continued Care - Discharged on 12/27/2023 Admission date: 12/26/2023 - Discharge disposition: Home or Self Care      Destination    No services have been selected for the patient.                Durable Medical Equipment    No services have been selected for the patient.                Dialysis/Infusion    No services have been selected for the patient.                Home Medical Care    No services have been selected for the patient.                Therapy    No services have been selected for the patient.                Community Resources    No services have been selected for the patient.                Community & DME    No services have been selected for the patient.                         Final Discharge Disposition Code: 01 - home or self-care

## 2023-12-28 NOTE — OUTREACH NOTE
Prep Survey      Flowsheet Row Responses   [facility] facility patient discharged from? [place]   Is LACE score < 7 ? No   Eligibility Readm Mgmt   Discharge diagnosis chest pain   Does the patient have one of the following disease processes/diagnoses(primary or secondary)? Other   Does the patient have Home health ordered? No   Is there a DME ordered? No   Prep survey completed? Yes            CALVIN JANSEN - Registered Nurse

## 2024-01-03 ENCOUNTER — READMISSION MANAGEMENT (OUTPATIENT)
Dept: CALL CENTER | Facility: HOSPITAL | Age: 74
End: 2024-01-03
Payer: MEDICARE

## 2024-01-03 NOTE — OUTREACH NOTE
Medical Week 1 Survey      Flowsheet Row Responses   [facility] facility patient discharged from? [place]   Does the patient have one of the following disease processes/diagnoses(primary or secondary)? Other   Week 1 attempt successful? No   Unsuccessful attempts Attempt 1            Britney Carey Registered Nurse

## 2024-01-10 ENCOUNTER — READMISSION MANAGEMENT (OUTPATIENT)
Dept: CALL CENTER | Facility: HOSPITAL | Age: 74
End: 2024-01-10
Payer: MEDICARE

## 2024-01-10 NOTE — OUTREACH NOTE
Medical Week 2 Survey      Flowsheet Row Responses   [facility] facility patient discharged from? [place]   Does the patient have one of the following disease processes/diagnoses(primary or secondary)? Other   Week 2 attempt successful? No   Unsuccessful attempts Attempt 1            Savannah Carey Registered Nurse

## 2024-01-16 ENCOUNTER — READMISSION MANAGEMENT (OUTPATIENT)
Dept: CALL CENTER | Facility: HOSPITAL | Age: 74
End: 2024-01-16
Payer: MEDICARE

## 2024-01-16 NOTE — OUTREACH NOTE
Medical Week 3 Survey      Flowsheet Row Responses   [facility] patient discharged from? [place]   Does the patient have one of the following disease processes/diagnoses(primary or secondary)? Other   Week 3 attempt successful? Yes   Call start time 1637   Call end time 1641   Discharge diagnosis chest pain   Person spoke with today (if not patient) and relationship pt   Meds reviewed with patient/caregiver? Yes   Is the patient having any side effects they believe may be caused by any medication additions or changes? No   Does the patient have all medications ordered at discharge? Yes   Is the patient taking all medications as directed (includes completed medication regime)? Yes   Did the patient receive a copy of their discharge instructions? Yes   Nursing interventions Reviewed instructions with patient   What is the patient's perception of their health status since discharge? Improving   Is the patient/caregiver able to teach back signs and symptoms related to disease process for when to call PCP? Yes   Is the patient/caregiver able to teach back signs and symptoms related to disease process for when to call 911? Yes   Is the patient/caregiver able to teach back the hierarchy of who to call/visit for symptoms/problems? PCP, Specialist, Home health nurse, Urgent Care, ED, 911 Yes   If the patient is a current smoker, are they able to teach back resources for cessation? Not a smoker   Week 3 Call Completed? Yes   Graduated Yes   Is the patient interested in additional calls from an ambulatory ? No   Would this patient benefit from a Referral to Amb Social Work? No   Graduated/Revoked comments Pt states she is doing better, and denies chest pain/SOB. Pt had PCP fu appt.   Wrap up additional comments Pt states she is doing better, and denies chest pain/SOB. Pt had PCP fu appt.   Call end time 1641            Puja LANDRY - Registered Nurse

## 2024-04-22 RX ORDER — MELOXICAM 7.5 MG/1
7.5 TABLET ORAL DAILY
Qty: 90 TABLET | Refills: 0 | Status: SHIPPED | OUTPATIENT
Start: 2024-04-22

## 2024-04-22 NOTE — TELEPHONE ENCOUNTER
Rx Refill Note  Requested Prescriptions     Pending Prescriptions Disp Refills    meloxicam (MOBIC) 7.5 MG tablet [Pharmacy Med Name: MELOXICAM 7.5MG] 90 tablet 0     Sig: TAKE 1 TABLET BY MOUTH DAILY.      Last office visit with prescribing clinician: 6/9/2021  Next office visit with prescribing clinician: Visit date not found   Last Filled:4/20/23     s/p left total knee arthroplasty, DOS 4/22/2019     Yara Elkins MA  04/22/24, 09:59 EDT    Previous RX pended for your approval, change or denial.     {TIP  Encounters:    {TIP  Please add Last Relevant Lab Date if appropriate:  {TIP  Is Refill Pharmacy correct?:

## 2024-05-28 RX ORDER — MELOXICAM 7.5 MG/1
7.5 TABLET ORAL DAILY
Qty: 90 TABLET | Refills: 0 | Status: SHIPPED | OUTPATIENT
Start: 2024-05-28

## 2024-05-28 NOTE — TELEPHONE ENCOUNTER
Rx Refill Note  Requested Prescriptions     Pending Prescriptions Disp Refills    meloxicam (MOBIC) 7.5 MG tablet [Pharmacy Med Name: MELOXICAM 7.5MG] 90 tablet 0     Sig: TAKE 1 TABLET BY MOUTH DAILY.      Last office visit with prescribing clinician: 6/9/2021  Next office visit with prescribing clinician: Visit date not found   Last Filled:4/22/24    No diagnosis found.   Date of Surgery: s/p left total knee arthroplasty, DOS 4/22/2019       Yara Elkins MA  05/28/24, 13:55 EDT    Previous RX pended for your approval, change or denial.     {TIP  Encounters:    {TIP  Please add Last Relevant Lab Date if appropriate:  {TIP  Is Refill Pharmacy correct?:

## 2024-12-16 ENCOUNTER — OFFICE VISIT (OUTPATIENT)
Dept: SLEEP MEDICINE | Facility: HOSPITAL | Age: 74
End: 2024-12-16
Payer: MEDICARE

## 2024-12-16 VITALS
HEART RATE: 71 BPM | BODY MASS INDEX: 24.84 KG/M2 | WEIGHT: 135 LBS | OXYGEN SATURATION: 95 % | SYSTOLIC BLOOD PRESSURE: 114 MMHG | HEIGHT: 62 IN | DIASTOLIC BLOOD PRESSURE: 67 MMHG

## 2024-12-16 DIAGNOSIS — G47.10 HYPERSOMNOLENCE: ICD-10-CM

## 2024-12-16 DIAGNOSIS — G47.33 OBSTRUCTIVE SLEEP APNEA, ADULT: Primary | ICD-10-CM

## 2024-12-16 DIAGNOSIS — G47.30 OBSERVED SLEEP APNEA: ICD-10-CM

## 2024-12-16 DIAGNOSIS — R06.83 LOUD SNORING: ICD-10-CM

## 2024-12-16 PROCEDURE — G0463 HOSPITAL OUTPT CLINIC VISIT: HCPCS

## 2024-12-16 NOTE — PROGRESS NOTES
[facility] [place] [facility]  [address]  [place] [address]  [phone]    Referring Physician: Dr. Michael  PCP: Jim Michael MD    Reason for consult:  Sleep disorders of snoring and apneas    Nikkie Kennedy is a 74 y.o.female was seen in the Sleep Disorders Center today. She sleeps from 10pm to 6:30am. Spouse reports snoring and apneas. She wakes up tired and unrefreshed. She wakes up 2-3 times during the night and sometimes stays awake for long time. She has Ambien prescribed and uses it infrequently. She will take tylenol-pm more often.  She denies any cardiovascular or cerebrovascular health problems.  [place] Sleepiness Score: 3. Caffeine intake 2-3 per day. Alcohol intake 0 per week.    Nikkie Kennedy  has a past medical history of Abnormal vaginal Pap smear (09/10/1999), Abnormal vaginal Pap smear (11/06/2000), Arthritis, Asthma, Disease of thyroid gland, DJD (degenerative joint disease) of knee, History of colposcopy (10/11/1999), History of UTI, Hole in the ear drum, right, Osteoporosis, Pulmonary embolism, Spinal headache, and Tick bite.     Current Medications:    Current Outpatient Medications:     ALLERGY SERUM INJECTION, Inject  under the skin into the appropriate area as directed Every 7 (Seven) Days., Disp: , Rfl:     EPINEPHrine (EPIPEN) 0.3 MG/0.3ML solution auto-injector injection, Inject 0.3 mL into the appropriate muscle as directed by prescriber 1 (One) Time As Needed (reaction)., Disp: , Rfl:     estradiol (ESTRACE) 0.1 MG/GM vaginal cream, Insert 1 g into the vagina 2 (Two) Times a Week., Disp: 42.5 g, Rfl: 2    fluticasone (FLONASE) 50 MCG/ACT nasal spray, , Disp: , Rfl:     levothyroxine (SYNTHROID, LEVOTHROID) 75 MCG tablet, Take 1 tablet by mouth Daily., Disp: , Rfl:     meloxicam (MOBIC) 7.5 MG tablet, TAKE 1 TABLET BY MOUTH DAILY., Disp: 90 tablet, Rfl: 0    pantoprazole (Protonix) 40 MG EC tablet, Take 1 tablet by mouth Daily., Disp: 30 tablet, Rfl:  "0    tobramycin-dexamethasone (TOBRADEX) 0.3-0.1 % ophthalmic suspension, 1 drop Daily As Needed. In ear, Disp: , Rfl:    also listed in Sleep Questionnaire.    FH: family history includes Cancer in her mother; Clotting disorder in an other family member; Colon cancer (age of onset: 76) in her mother; Deep vein thrombosis in an other family member.  SH:  reports that she has never smoked. She has never used smokeless tobacco. She reports that she does not drink alcohol and does not use drugs.    Pertinent Positive Review of Systems of denies  Rest of Review of Systems was negative as recorded in Sleep Questionnaire.        Vital Signs: /67   Pulse 71   Ht 157.5 cm (62\")   Wt 61.2 kg (135 lb)   SpO2 95%   BMI 24.69 kg/m²     Body mass index is 24.69 kg/m².       Tongue: Large       Dentition: good       Pharynx: Posterior pharyngeal pillars are unable   Mallampatti: IV (only hard palate visible)        General: Alert. Cooperative. Well developed. No acute distress.             Head:  Normocephalic. Symmetrical. Atraumatic.              Eyes: Sclera clear. No icterus. PERRLA. Normal EOM.            Nose: No septal deviation. No drainage.          Throat: No oral lesions. No thrush. Moist mucous membranes.    Chest Wall:  Normal shape. Symmetric expansion with respiration. No tenderness.             Neck:  Trachea midline.           Lungs:  Clear to auscultation bilaterally.            Heart:  Regular rhythm and normal rate. Normal S1 and S2. No murmur.     Abdomen:  Soft, non-tender and non-distended. Normal bowel sounds. No masses.  Extremities:  Moves all extremities well. No edema.           Pulses: Pulses palpable and equal bilaterally.               Skin: Dry. Intact. No bleeding. No rash.           Neuro: Moves all 4 extremities and cranial nerves grossly intact.  Psychiatric: Normal mood and affect.      Report:  No scans are attached to the encounter or orders placed in the " encounter.      Impression:  1. Obstructive sleep apnea, adult    2. Loud snoring    3. Observed sleep apnea    4. Hypersomnolence          Orders Placed This Encounter   Procedures    Home Sleep Study     Standing Status:   Future     Standing Expiration Date:   12/16/2025     Scheduling Instructions:      Ask patient to sleep on back as much as possible on night of study     Order Specific Question:   May take own meds     Answer:   Yes     Order Specific Question:   Details     Answer:   O2 Implementation per Protocol     Order Specific Question:   Release to patient     Answer:   Routine Release [[id number]]            Plan:  Nikkie needs further testing to determine if she has sleep apnea as possibly causing her sleep disruption at night.  She certainly has some EDS and snoring and apneas as reported by her spouse.  We discussed doing a home test versus in lab.  She prefers to trial HST first and if inadequate then agrees to proceed with in lab testing.  Instructed to sleep in all body positions for HST.  We briefly reviewed the cardiovascular health consequences of untreated sleep apnea and possible treatment options.    This patient has typical features of obstructive sleep apnea with hypersomnolence snoring and apneas along with elevated BMI and classic oropharyngeal structure.  Likelihood of obstructive sleep apnea is high and a polysomnogram study will therefore be requested.      Possible diagnosis and pathophysiology of obstructive sleep apnea was discussed with the patient.  Health risks of untreated obstructive sleep apnea including cardiovascular risks were discussed.  Patient was cautioned about activities requiring full concentration especially driving at night or for longer distances, and until hypersomnolence is corrected.    Results of sleep testing will be reviewed to see if patient is a candidate for CPAP machine.  Alternatives to therapy include oral mandibular advancing device (OMAD) were  discussed. However OMAD is usually only beneficial in mild obstructive sleep apnea.  The benefit of weight loss in reducing severity of obstructive sleep apnea was discussed.  Patient would benefit from adhering to a strict diet to achieve ideal BMI.     Patient will follow up in this clinic after testing.    Thank you for allowing me to participate in your patient's care.    Electronically signed by Holden Barone MD, 12/16/24, 1:13 PM EST.    Part of this note may be an electronic transcription/translation of spoken language to printed text using the Dragon Dictation System.

## 2024-12-27 ENCOUNTER — HOSPITAL ENCOUNTER (OUTPATIENT)
Dept: SLEEP MEDICINE | Facility: HOSPITAL | Age: 74
Discharge: HOME | End: 2024-12-27
Admitting: INTERNAL MEDICINE
Payer: MEDICARE

## 2024-12-27 DIAGNOSIS — G47.33 OBSTRUCTIVE SLEEP APNEA, ADULT: ICD-10-CM

## 2024-12-27 PROCEDURE — G0399 HOME SLEEP TEST/TYPE 3 PORTA: HCPCS

## 2025-01-20 ENCOUNTER — DOCUMENTATION (OUTPATIENT)
Dept: SLEEP MEDICINE | Facility: HOSPITAL | Age: 75
End: 2025-01-20
Payer: MEDICARE

## 2025-01-20 ENCOUNTER — OFFICE VISIT (OUTPATIENT)
Dept: SLEEP MEDICINE | Facility: HOSPITAL | Age: 75
End: 2025-01-20
Payer: MEDICARE

## 2025-01-20 VITALS
OXYGEN SATURATION: 96 % | WEIGHT: 130 LBS | DIASTOLIC BLOOD PRESSURE: 74 MMHG | SYSTOLIC BLOOD PRESSURE: 120 MMHG | BODY MASS INDEX: 23.92 KG/M2 | HEART RATE: 88 BPM | HEIGHT: 62 IN

## 2025-01-20 DIAGNOSIS — G47.33 OBSTRUCTIVE SLEEP APNEA, ADULT: Primary | ICD-10-CM

## 2025-01-20 PROCEDURE — G0463 HOSPITAL OUTPT CLINIC VISIT: HCPCS

## 2025-01-20 NOTE — PROGRESS NOTES
Pt therapy device and supply order has been sent over to [employer] for New Pt set up. Follow up and compliance has been scheduled for 3/2025.  Pt had been seen today by Dr. Barone and given sleep study results in office.

## 2025-01-20 NOTE — PROGRESS NOTES
[facility] [place] [facility]  [address]  [place] [address]  [phone]    PCP: Jim Michael MD    Reason for visit:  Sleep disorders: ISABELLA    Nikkie is a 74 y.o.female who was seen in the Sleep Disorders Center today.  She is here to go over the results of her sleep study.  Sleep from 10 PM to 6:30 AM.  She is unable to sleep on her right lateral position.  She mostly sleeps on her back.  On the sleep study she slept in the left lateral position for only 5 minutes.  [place] Sleepiness Scale is 5. Caffeine 2 per day. Alcohol 0 per week.    Nikkie  reports that she has never smoked. She has never used smokeless tobacco.    Pertinent Positive Review of Systems of denies  Rest of Review of Systems was negative as recorded in Sleep Questionnaire.    Patient  has a past medical history of Abnormal vaginal Pap smear (09/10/1999), Abnormal vaginal Pap smear (11/06/2000), Arthritis, Asthma, Disease of thyroid gland, DJD (degenerative joint disease) of knee, History of colposcopy (10/11/1999), History of UTI, Hole in the ear drum, right, Osteoporosis, Pulmonary embolism, Spinal headache, and Tick bite.     Current Medications:    Current Outpatient Medications:     ALLERGY SERUM INJECTION, Inject  under the skin into the appropriate area as directed Every 7 (Seven) Days., Disp: , Rfl:     EPINEPHrine (EPIPEN) 0.3 MG/0.3ML solution auto-injector injection, Inject 0.3 mL into the appropriate muscle as directed by prescriber 1 (One) Time As Needed (reaction)., Disp: , Rfl:     estradiol (ESTRACE) 0.1 MG/GM vaginal cream, Insert 1 g into the vagina 2 (Two) Times a Week., Disp: 42.5 g, Rfl: 2    fluticasone (FLONASE) 50 MCG/ACT nasal spray, , Disp: , Rfl:     levothyroxine (SYNTHROID, LEVOTHROID) 75 MCG tablet, Take 1 tablet by mouth Daily., Disp: , Rfl:     meloxicam (MOBIC) 7.5 MG tablet, TAKE 1 TABLET BY MOUTH DAILY., Disp: 90 tablet, Rfl: 0    pantoprazole (Protonix) 40 MG EC tablet, Take 1  "tablet by mouth Daily., Disp: 30 tablet, Rfl: 0    tobramycin-dexamethasone (TOBRADEX) 0.3-0.1 % ophthalmic suspension, 1 drop Daily As Needed. In ear, Disp: , Rfl:    also entered in Sleep Questionnaire         Vital Signs: /74   Pulse 88   Ht 157.5 cm (62.01\")   Wt 59 kg (130 lb)   SpO2 96%   BMI 23.77 kg/m²     Body mass index is 23.77 kg/m².       Tongue: Large       Dentition: good       Pharynx: Posterior pharyngeal pillars are unable   Mallampatti: IV (only hard palate visible)        General: Alert. Cooperative. Well developed. No acute distress.             Nose: No septal deviation. No drainage.          Throat: No oral lesions. No thrush. Moist mucous membranes.           Lungs:  Clear to auscultation bilaterally.            Heart:  Regular rhythm and normal rate. No murmur.     Diagnostic data available to date is as below and was reviewed on current visit:  12/30/24: The patient tolerated the home sleep testing with monitoring time of 342.3 minutes. The data obtained make this a technically adequate study. The apnea hypopneas index(AHI) was 42.6 per sleep hour.  The AHI during supine position was 43.8 per sleep hour. Mean heart rate of 73.1 BPM.  Snoring was noted 27.4% of sleep time. Lowest oxygen saturation during the study was 73%. Saturation below 89% was noted for 18.1 mins.     Lab Results   Component Value Date    IRON 32 (L) 04/12/2021    TIBC 174 (L) 04/12/2021    FERRITIN 26,500.0 (H) 04/12/2021       No orders of the defined types were placed in this encounter.         Impression:  1. Obstructive sleep apnea, adult        Plan:  Nikkie has severe sleep apnea in the supine position.  She finds it difficult to sleep in other body positions.  Discussed treatment options.  Given crowded oropharynx I do not think oral appliance would be an option for her.  Will therefore set up with auto 5-15.    Nikkie has severe sleep apnea.  I discussed results with her and explained the " cardiovascular health associations besides other health problems associated with significant sleep disordered breathing.  We discussed various modalities of treatment and went through the pros and cons of each. Oral mandibular advancing device and possible use of an INSPIRE device was discussed.   In her case I would recommend treatment with a positive airway pressure device.      Positive airway pressure devices are most effective management strategy. Various mask interfaces were discussed.  I explained that the most important factor in compliance is a comfortable mask and the profile of the mask on the face (full face / nasal etc.) eventually makes little difference. Nikkie will be fitted for a mask by DME.  I reminded her that the device mask can be changed within the first month as many times as she likes.  Thereafter it can only be changed every 3 months through insurance.      The pressure on the positive airway pressure machine can feel overwhelming at first.  If this is the case Nikkie could use the machine while awake, such as while watching television, in order to get used.  However the face acclimatize to the pressure within a few weeks and thereafter the pressures are far less noticeable.  I explained to her the necessity of breathing through the nose and not through the mouth.      Compliance requirements of insurance were discussed and especially the fact that lack of compliance within the first 90 days of at least 4 hours usage nightly, may result in repossession of the positive airway pressure device by the durable medical equipment company. Nikkie  will then have to have a repeat sleep study prior to getting a new device.    I have prescribed a new device to her preferred durable medical equipment company and will see Nikkie back for a compliance check.     Patient will follow up in this clinic in 1 month  YAO    Thank you for allowing me to participate in your patient's care.    Electronically  signed by Holden Barone MD, 01/20/25, 11:20 AM EST.    Part of this note may be an electronic transcription/translation of spoken language to printed text using the Dragon Dictation System.

## 2025-03-19 ENCOUNTER — DOCUMENTATION (OUTPATIENT)
Dept: SLEEP MEDICINE | Facility: HOSPITAL | Age: 75
End: 2025-03-19
Payer: MEDICARE

## 2025-03-19 ENCOUNTER — OFFICE VISIT (OUTPATIENT)
Dept: ORTHOPEDIC SURGERY | Facility: CLINIC | Age: 75
End: 2025-03-19
Payer: MEDICARE

## 2025-03-19 ENCOUNTER — OFFICE VISIT (OUTPATIENT)
Dept: SLEEP MEDICINE | Facility: HOSPITAL | Age: 75
End: 2025-03-19
Payer: MEDICARE

## 2025-03-19 VITALS
SYSTOLIC BLOOD PRESSURE: 94 MMHG | HEART RATE: 77 BPM | HEIGHT: 62 IN | DIASTOLIC BLOOD PRESSURE: 63 MMHG | BODY MASS INDEX: 23.77 KG/M2

## 2025-03-19 VITALS
BODY MASS INDEX: 23.92 KG/M2 | OXYGEN SATURATION: 91 % | HEART RATE: 77 BPM | DIASTOLIC BLOOD PRESSURE: 71 MMHG | HEIGHT: 62 IN | SYSTOLIC BLOOD PRESSURE: 110 MMHG | WEIGHT: 130 LBS

## 2025-03-19 DIAGNOSIS — G47.33 OBSTRUCTIVE SLEEP APNEA, ADULT: Primary | ICD-10-CM

## 2025-03-19 DIAGNOSIS — R06.83 LOUD SNORING: ICD-10-CM

## 2025-03-19 DIAGNOSIS — M94.261 CHONDROMALACIA OF KNEE, RIGHT: ICD-10-CM

## 2025-03-19 DIAGNOSIS — Z78.9 DIFFICULTY WITH CPAP USE: ICD-10-CM

## 2025-03-19 DIAGNOSIS — M25.561 RIGHT KNEE PAIN, UNSPECIFIED CHRONICITY: Primary | ICD-10-CM

## 2025-03-19 DIAGNOSIS — J93.82 AIR LEAK: ICD-10-CM

## 2025-03-19 PROCEDURE — 99203 OFFICE O/P NEW LOW 30 MIN: CPT | Performed by: ORTHOPAEDIC SURGERY

## 2025-03-19 PROCEDURE — 20610 DRAIN/INJ JOINT/BURSA W/O US: CPT | Performed by: ORTHOPAEDIC SURGERY

## 2025-03-19 PROCEDURE — 1160F RVW MEDS BY RX/DR IN RCRD: CPT | Performed by: ORTHOPAEDIC SURGERY

## 2025-03-19 PROCEDURE — 1159F MED LIST DOCD IN RCRD: CPT | Performed by: ORTHOPAEDIC SURGERY

## 2025-03-19 PROCEDURE — G0463 HOSPITAL OUTPT CLINIC VISIT: HCPCS

## 2025-03-19 RX ADMIN — LIDOCAINE HYDROCHLORIDE 8 ML: 10 INJECTION, SOLUTION EPIDURAL; INFILTRATION; INTRACAUDAL; PERINEURAL at 12:24

## 2025-03-19 RX ADMIN — TRIAMCINOLONE ACETONIDE 80 MG: 40 INJECTION, SUSPENSION INTRA-ARTICULAR; INTRAMUSCULAR at 12:24

## 2025-03-19 NOTE — PROGRESS NOTES
Knee Musculoskeletal Exam  Large Joint Arthrocentesis: R knee  Date/Time: 3/19/2025 12:24 PM  Consent given by: patient  Site marked: site marked  Timeout: Immediately prior to procedure a time out was called to verify the correct patient, procedure, equipment, support staff and site/side marked as required   Supporting Documentation  Indications: pain   Procedure Details  Location: knee - R knee  Preparation: Patient was prepped and draped in the usual sterile fashion  Needle size: 22 G  Approach: anterolateral  Medications administered: 80 mg triamcinolone acetonide 40 MG/ML; 8 mL lidocaine PF 1% 1 %  Patient tolerance: patient tolerated the procedure well with no immediate complications

## 2025-03-19 NOTE — PROGRESS NOTES
Pressure changes made in sleep center office in [employer] per YAO Byrnes Z:     Device   [id number] - [employer] 11 AutoSet   View  03/19/2025, 11:50 AM    by Gutierrez Winter    Settings sent to device    Request [id number]    Set Mode to AutoSet for Her  Set Min Pressure to 5 cmH2O  Set Max Pressure to 9 cmH2O  Set EPR to Off  Set Ramp enable to Auto  Set Start pressure to 4.0 cmH2O  Set Humidifier level to 4  01/24/2025, 01:14 PM    Settings change detected on device    Set Mode to AutoSet for Her  Set Min Pressure to 5 cmH2O  Set Max Pressure to 15 cmH2O  Set EPR to Off  Set Ramp enable to Auto  Set Start pressure to 4.0 cmH2O  Set Humidifier level to 4  Terms of use View system requireme

## 2025-03-19 NOTE — PROGRESS NOTES
Subjective:     Patient ID: Nikkie Kennedy is a 74 y.o. female.    Chief Complaint:  Right knee pain, new patient  History of Present Illness  History of Present Illness  The patient presents to the clinic today for a new evaluation of right knee pain.    The onset of the pain was approximately 3 to 4 months ago, with no preceding injury. She rates the pain as an 8 out of 10, describing it as throbbing in nature. Accompanying symptoms include swelling, clicking, popping, and occasional episodes of the knee giving way. The pain is exacerbated by standing, working, walking, and climbing stairs, and only mildly improved with rest. She has not received any intra-articular injections and reports minimal improvement with over-the-counter anti-inflammatory medications. The pain is localized to the anterior medial aspect of her right knee. She does not experience any systemic symptoms such as fevers, chills, or sweats.     Social History     Occupational History    Not on file   Tobacco Use    Smoking status: Never     Passive exposure: Never    Smokeless tobacco: Never   Vaping Use    Vaping status: Never Used   Substance and Sexual Activity    Alcohol use: No    Drug use: No    Sexual activity: Not Currently     Partners: Male     Birth control/protection: Post-menopausal     Comment:       Past Medical History:   Diagnosis Date    Abnormal vaginal Pap smear 09/10/1999    [place]    Abnormal vaginal Pap smear 11/06/2000    [facility]    Arthritis     Asthma     h/o, no inhaler    Disease of thyroid gland     hypothyroid    DJD (degenerative joint disease) of knee     sched TKA left    History of colposcopy 10/11/1999    chronic endocervicitis    History of UTI     Hole in the ear drum, right     d/t tube    Osteoporosis     Pulmonary embolism     Spinal headache     Tick bite     Pt on vent 18 days, hospitalized 34 days from illness from tick     Past Surgical History:   Procedure Laterality Date    BREAST CYST ASPIRATION  "Right     20 yrs ago    BREAST CYST INCISION AND DRAINAGE  12/1996, 1997    CHOLECYSTECTOMY  03/2016    COLONOSCOPY      COLONOSCOPY N/A 07/27/2023    Procedure: COLONOSCOPY;  Surgeon: Evangelista Brower MD;  Location: [facility] OR;  Service: Gastroenterology;  Laterality: N/A;  Diverticulosis; External hemorrhoids    EAR TUBES      FOOT SURGERY      TOTAL KNEE ARTHROPLASTY Left 04/22/2019    Procedure: left total knee arthroplasty, possible placement of ON-Q pump, and all associated procedures;  Surgeon: Dhaval Quinn MD;  Location: [facility] OR;  Service: Orthopedics    TUBAL ABDOMINAL LIGATION      VARICOSE VEIN SURGERY  2015    Pt states she had varicose veins removed on both legs (2-3 years ago).       Family History   Problem Relation Age of Onset    Cancer Mother     Colon cancer Mother 76    Clotting disorder Other         does not know name    Deep vein thrombosis Other     Breast cancer Neg Hx     Ovarian cancer Neg Hx     Malig Hyperthermia Neg Hx          Review of Systems        Objective:  Vitals:    03/19/25 1138   BP: 94/63   Pulse: 77   Height: 157.5 cm (62.01\")     There were no vitals filed for this visit.  Body mass index is 23.77 kg/m².  Physical Exam    Vital signs reviewed.   General: No acute distress, alert and oriented  Eyes: conjunctiva clear; pupils equally round and reactive  ENT: external ears and nose atraumatic; oropharynx clear  CV: no peripheral edema  Resp: normal respiratory effort  Skin: no rashes or wounds; normal turgor  Psych: mood and affect appropriate; recent and remote memory intact          Physical Exam  The right knee has an active range of motion from 0 to 125 degrees, with strength rated at 4+ out of 5 during flexion and extension. There is maximal tenderness upon palpation of the medial joint line as well as the medial and lateral patellar facet. An active patellar compression test is positive. A Haley's exam elicits pain along the medial joint line, but no click. " The knee is stable to varus and valgus stress at zero and 30 degrees. A Lachman test shows grade 1 results. Anterior and posterior drawer tests are negative. No hip pain is observed during log roll and [facility] exams.         Imaging:  Right Knee X-Ray  Indication: Pain    AP, Lateral, and [place] views    Findings:  No fracture  No bony lesion  Normal soft tissues  Moderate medial compartment joint space narrowing, slight varus alignment noted, mild reactive osteophyte formation appreciated.    No prior studies were available for comparison.    Assessment:        1. Right knee pain, unspecified chronicity    2. Chondromalacia of knee, right           Plan:          Assessment & Plan  1. Right knee pain.  She reports right knee pain that started approximately 3 to 4 months ago, rated as 8 out of 10, throbbing in nature, with associated swelling, clicking, popping, and occasional giving way episodes. The pain is localized to the anterior medial aspect of the right knee and worsens with standing, working, walking, and climbing stairs, with mild improvement with rest. Physical examination reveals active range of motion 0 to 125 degrees, 4+ out of 5 strength on flexion and extension, maximal tenderness to palpation medial joint line, positive active patellar compression test, and positive Haley's exam with pain along the medial joint line.     Discussed treatment options at length with the patient. She does have some evidence of chondromalacia, particularly in the medial compartment. Advanced imaging was discussed but she wants to hold off on that at this time.     She would like to try an injection to her right knee given underlying chondromalacia as well as topical compound cream given some of the superficial pain that she is experiencing at this time as well. Prescription sent in at this time and injection administered today. Continue with home exercise to work on range of motion and strength as tolerated. Plan  on seeing her back on an as needed basis if she gets recurrence of symptoms or failure to improve with injection. She was in agreement. Had all questions answered.    Patient would like to proceed with cortisone injection today to the right knee. Recommended limited use of affected extremity for the next 24 hours to only essential activites other than work on general active and passive motion. Recommended supplementing with ice and soft tissue massage. Discussed with patient that they should see results in 5-7 days, if no improvement in 5-6 weeks I have asked them to call the office to review other options. Patient should call office immediately if they notice redness, warmth, fevers, chills, or residual numbness or tingling for greater than 6 hours after injection.       Follow-up  The patient will follow up on an as needed basis.        Nikkie Kennedy was in agreement with plan and had all questions answered.     Orders:  Orders Placed This Encounter   Procedures    Large Joint Arthrocentesis: R knee    XR Knee 3 View Right       Medications:  New Medications Ordered This Visit   Medications    Ibuprofen 3 %, Gabapentin 10 %, Baclofen 2 %, lidocaine 4 %, Ketamine HCl 4 %     Sig: Apply 1-2 g topically to the appropriate area as directed 3 (Three) to 4 (Four) times daily.     Dispense:  90 g     Refill:  2     May substitute alternative formula as coverage requires, Ketamine 4%, Ibuprofen 3%, Baclofen 2%, Gabapentin 10%, Lidocaine 4%       Followup:  Return if symptoms worsen or fail to improve.    Diagnoses and all orders for this visit:    1. Right knee pain, unspecified chronicity (Primary)  -     XR Knee 3 View Right    2. Chondromalacia of knee, right  -     Large Joint Arthrocentesis: R knee  -     Ibuprofen 3 %, Gabapentin 10 %, Baclofen 2 %, lidocaine 4 %, Ketamine HCl 4 %; Apply 1-2 g topically to the appropriate area as directed 3 (Three) to 4 (Four) times daily.  Dispense: 90 g; Refill: 2          BMI is  within normal parameters. No other follow-up for BMI required.       Dictated utilizing Dragon dictation     Patient or patient representative verbalized consent for the use of Ambient Listening during the visit with  Dhaval Quinn MD for chart documentation. 4/14/2025  11:58 EDT

## 2025-03-19 NOTE — PROGRESS NOTES
[facility]  Telephone: [phone] / Fax: [phone] [place]  Telephone: [phone] / Fax: [phone] Julissa Nur    PCP: Jim Michael MD    Reason for visit: ISABELLA f/u    Nikkie Kennedy is a 74 y.o.female  was last seen at [facility] sleep lab in January 2025. Auto CPAP 5-15 was ordered. She is here today to review response. She is currently on a full face mask. It does not fit well. With nasal pillows she had leaks into the eyes. She does ok until the pressures get to 9cm H2O or above. Once they ramp up, she wakes up. Despite this, she feels that her sleep has improved. She no longer wakes up as often as she did before while using the machine. She feels more rested in morning hours. Medications and history reviewed. Bedtime schedule is 9:30pm-6:30am. ESS is 7.    SH- no tobacco, 0 alc per week, 1-2 caffeine    ROS- negative.    DME [facility]    Current Medications:    Current Outpatient Medications:     ALLERGY SERUM INJECTION, Inject  under the skin into the appropriate area as directed Every 7 (Seven) Days., Disp: , Rfl:     EPINEPHrine (EPIPEN) 0.3 MG/0.3ML solution auto-injector injection, Inject 0.3 mL into the appropriate muscle as directed by prescriber 1 (One) Time As Needed (reaction)., Disp: , Rfl:     estradiol (ESTRACE) 0.1 MG/GM vaginal cream, Insert 1 g into the vagina 2 (Two) Times a Week., Disp: 42.5 g, Rfl: 2    fluticasone (FLONASE) 50 MCG/ACT nasal spray, , Disp: , Rfl:     Ibuprofen 3 %, Gabapentin 10 %, Baclofen 2 %, lidocaine 4 %, Ketamine HCl 4 %, Apply 1-2 g topically to the appropriate area as directed 3 (Three) to 4 (Four) times daily., Disp: 90 g, Rfl: 2    levothyroxine (SYNTHROID, LEVOTHROID) 75 MCG tablet, Take 1 tablet by mouth Daily., Disp: , Rfl:     meloxicam (MOBIC) 7.5 MG tablet, TAKE 1 TABLET BY MOUTH DAILY., Disp: 90 tablet, Rfl: 0    pantoprazole (Protonix) 40 MG EC tablet, Take 1 tablet by mouth Daily., Disp: 30 tablet, Rfl: 0     "tobramycin-dexamethasone (TOBRADEX) 0.3-0.1 % ophthalmic suspension, 1 drop Daily As Needed. In ear, Disp: , Rfl:    also entered in Sleep Questionnaire    Patient  has a past medical history of Abnormal vaginal Pap smear (09/10/1999), Abnormal vaginal Pap smear (11/06/2000), Arthritis, Asthma, Disease of thyroid gland, DJD (degenerative joint disease) of knee, History of colposcopy (10/11/1999), History of UTI, Hole in the ear drum, right, Osteoporosis, Pulmonary embolism, Spinal headache, and Tick bite.    I have reviewed the Past Medical History, Past Surgical History, Social History and Family History.    Vital Signs /71   Pulse 77   Ht 157.5 cm (62.01\")   Wt 59 kg (130 lb)   SpO2 91%   BMI 23.77 kg/m²  Body mass index is 23.77 kg/m².    General Alert and oriented. No acute distress noted   Pharynx/Throat Class IV  Mallampati airway, large tongue, no evidence of redundant lateral pharyngeal tissue. No oral lesions. No thrush. Moist mucous membranes.   Head Normocephalic. Symmetrical. Atraumatic.    Nose No septal deviation. No drainage   Chest Wall Normal shape. Symmetric expansion with respiration. No tenderness.   Neck Trachea midline, no thyromegaly or adenopathy    Lungs Clear to auscultation bilaterally. No wheezes. No rhonchi. No rales. Respirations regular, even and unlabored.   Heart Regular rhythm and normal rate. Normal S1 and S2. No murmur   Abdomen Soft, non-tender and non-distended. Normal bowel sounds. No masses.   Extremities Moves all extremities well. No edema   Psychiatric Normal mood and affect.     Testing:  Download last 55 days, avg use 7 hours and 46 minutes avg pr 11cm H2O, residual AHI 8.7/hr, leak 83.8L.min.     Study-Diagnostic findings: The patient tolerated the home sleep testing with monitoring time of 342.3 minutes. The data obtained make this a technically adequate study. The apnea hypopneas index(AHI) was 42.6 per sleep hour.  The AHI during supine position was 43.8 " per sleep hour. Mean heart rate of 73.1 BPM.  Snoring was noted 27.4% of sleep time. Lowest oxygen saturation during the study was 73%. Saturation below 89% was noted for 18.1 mins.     Impression:  1. Obstructive sleep apnea, adult    2. Loud snoring    3. Air leak    4. Difficulty with CPAP use          Plan:  Change pressure to 5-9cm H2O in view of leaks and discomfort. I asked patient to see DME for mask re-fit. She can try nasal cushion mask style.     Call if pressures feel too low or if snoring/apneas develop. Pre treatment AHI was 42/hr. AHI on treatment is down to 8/hr and is likely made worse by large leaks. Review AHI in 3 months.    I reviewed original sleep study and recent download report with patient.       Thank you for allowing me to participate in your patient's care.      YAO Oro  [facility]  Phone: [phone]      Part of this note may be an electronic transcription/translation of spoken language to printed text using the Dragon Dictation System.

## 2025-03-24 ENCOUNTER — DOCUMENTATION (OUTPATIENT)
Dept: SLEEP MEDICINE | Facility: HOSPITAL | Age: 75
End: 2025-03-24
Payer: MEDICARE

## 2025-03-24 NOTE — PROGRESS NOTES
Pt was provided the N 30i therapy mask in sleep center office for her cpap size SW ( small wide)  DME [employer] was provided this information to change pt's supply order to the new mask.

## 2025-04-14 RX ORDER — LIDOCAINE HYDROCHLORIDE 10 MG/ML
8 INJECTION, SOLUTION EPIDURAL; INFILTRATION; INTRACAUDAL; PERINEURAL
Status: COMPLETED | OUTPATIENT
Start: 2025-03-19 | End: 2025-03-19

## 2025-04-14 RX ORDER — TRIAMCINOLONE ACETONIDE 40 MG/ML
80 INJECTION, SUSPENSION INTRA-ARTICULAR; INTRAMUSCULAR
Status: COMPLETED | OUTPATIENT
Start: 2025-03-19 | End: 2025-03-19

## 2025-04-22 ENCOUNTER — TRANSCRIBE ORDERS (OUTPATIENT)
Dept: ADMINISTRATIVE | Facility: HOSPITAL | Age: 75
End: 2025-04-22
Payer: MEDICARE

## 2025-04-22 DIAGNOSIS — Z12.31 SCREENING MAMMOGRAM FOR BREAST CANCER: Primary | ICD-10-CM

## 2025-04-28 ENCOUNTER — TRANSCRIBE ORDERS (OUTPATIENT)
Dept: ADMINISTRATIVE | Facility: HOSPITAL | Age: 75
End: 2025-04-28
Payer: MEDICARE

## 2025-04-28 DIAGNOSIS — Z78.0 ASYMPTOMATIC MENOPAUSAL STATE: Primary | ICD-10-CM

## 2025-06-02 ENCOUNTER — OFFICE VISIT (OUTPATIENT)
Dept: SLEEP MEDICINE | Facility: HOSPITAL | Age: 75
End: 2025-06-02
Payer: MEDICARE

## 2025-06-02 VITALS
SYSTOLIC BLOOD PRESSURE: 109 MMHG | HEART RATE: 73 BPM | OXYGEN SATURATION: 98 % | BODY MASS INDEX: 23.92 KG/M2 | WEIGHT: 130 LBS | HEIGHT: 62 IN | DIASTOLIC BLOOD PRESSURE: 73 MMHG

## 2025-06-02 DIAGNOSIS — G47.33 OBSTRUCTIVE SLEEP APNEA, ADULT: Primary | ICD-10-CM

## 2025-06-02 PROCEDURE — G0463 HOSPITAL OUTPT CLINIC VISIT: HCPCS

## 2025-06-02 NOTE — PROGRESS NOTES
"[facility] [place] [facility]  [address]  [place] [address]  [phone]    PCP: Jim Michael MD    Reason for visit:  Sleep disorders: ISABELLA    Nikkie \"Belkis\" is a 74 y.o.female who was seen in the Sleep Disorders Center today. Regular fu. Her pr was reduced last visit. She uses N30i mask and likes it. Cannot feel air leaks or dry mouth. She is tolerant of lower pr. Sleeps at 10pm to 6am. Wakes up rested and refreshed. No EDS.  [place] Sleepiness Scale is 5. Caffeine 2 per day. Alcohol 0 per week.    Nikkie \"Belkis\"  reports that she has never smoked. She has never been exposed to tobacco smoke. She has never used smokeless tobacco.    Pertinent Positive Review of Systems of denies  Rest of Review of Systems was negative as recorded in Sleep Questionnaire.    Patient  has a past medical history of Abnormal vaginal Pap smear (09/10/1999), Abnormal vaginal Pap smear (11/06/2000), Arthritis, Asthma, Disease of thyroid gland, DJD (degenerative joint disease) of knee, History of colposcopy (10/11/1999), History of UTI, Hole in the ear drum, right, Osteoporosis, Pulmonary embolism, Spinal headache, and Tick bite.     Current Medications:    Current Outpatient Medications:     ALLERGY SERUM INJECTION, Inject  under the skin into the appropriate area as directed Every 7 (Seven) Days., Disp: , Rfl:     EPINEPHrine (EPIPEN) 0.3 MG/0.3ML solution auto-injector injection, Inject 0.3 mL into the appropriate muscle as directed by prescriber 1 (One) Time As Needed (reaction)., Disp: , Rfl:     estradiol (ESTRACE) 0.1 MG/GM vaginal cream, Insert 1 g into the vagina 2 (Two) Times a Week., Disp: 42.5 g, Rfl: 2    fluticasone (FLONASE) 50 MCG/ACT nasal spray, , Disp: , Rfl:     Ibuprofen 3 %, Gabapentin 10 %, Baclofen 2 %, lidocaine 4 %, Ketamine HCl 4 %, Apply 1-2 g topically to the appropriate area as directed 3 (Three) to 4 (Four) times daily., Disp: 90 g, Rfl: 2    levothyroxine (SYNTHROID, " "LEVOTHROID) 75 MCG tablet, Take 1 tablet by mouth Daily., Disp: , Rfl:     meloxicam (MOBIC) 7.5 MG tablet, TAKE 1 TABLET BY MOUTH DAILY., Disp: 90 tablet, Rfl: 0    pantoprazole (Protonix) 40 MG EC tablet, Take 1 tablet by mouth Daily., Disp: 30 tablet, Rfl: 0    tobramycin-dexamethasone (TOBRADEX) 0.3-0.1 % ophthalmic suspension, 1 drop Daily As Needed. In ear, Disp: , Rfl:    also entered in Sleep Questionnaire         Vital Signs: /73   Pulse 73   Ht 157.5 cm (62.01\")   Wt 59 kg (130 lb)   SpO2 98%   BMI 23.77 kg/m²     Body mass index is 23.77 kg/m².       Tongue: Large       Dentition: good       Pharynx: Posterior pharyngeal pillars are unable   Mallampatti: IV (only hard palate visible)        General: Alert. Cooperative. Well developed. No acute distress.             Nose: No septal deviation. No drainage.          Throat: No oral lesions. No thrush. Moist mucous membranes.           Lungs:  Clear to auscultation bilaterally.            Heart:  Regular rhythm and normal rate. No murmur.     Diagnostic data available to date is as below and was reviewed on current visit:  12/30/24: The patient tolerated the home sleep testing with monitoring time of 342.3 minutes. The data obtained make this a technically adequate study. The apnea hypopneas index(AHI) was 42.6 per sleep hour.  The AHI during supine position was 43.8 per sleep hour. Mean heart rate of 73.1 BPM.  Snoring was noted 27.4% of sleep time. Lowest oxygen saturation during the study was 73%. Saturation below 89% was noted for 18.1 mins.     Lab Results   Component Value Date    IRON 32 (L) 04/12/2021    TIBC 174 (L) 04/12/2021    FERRITIN 26,500.0 (H) 04/12/2021       Most current available usage data reviewed on 06/02/2025:  No scans are attached to the encounter or orders placed in the encounter.  100% compliance average 7 hours 45 minutes AHI 4.8 average pressure 8 cm Auto CPAP set 5-9    [facility] [employer]: [employer]    Prescription to [facility] for " "replacement supplies as below:    nasal mask    Replace head-gear every 3 months.  Replace cushions every 2-4 weeks.     A 4604 Heated Tubing  every 3 mth    A 7037 Standard Tubing  every 3 mth   x A 7035 Headgear  every 3 mth   x A 7046 Repl Humidifier Chamber  every 6 yrs   x A 7038 Disposable Filters  2 per mth   x A 7039 Non-disposable Filter  every 6 mth   x A 7036 Chin Strap  every 6 mth     No orders of the defined types were placed in this encounter.         Impression:  1. Obstructive sleep apnea, adult        Plan:  Nikkie \"Belkis\" is doing well with device at current pressure settings.  Keep same and advise continued excellent compliance given underlying severity.  She will speak with her DME about the air leaks which may be in the circuit already humidifier To the machine.  However subjectively she does not notice any air leaks or discomfort from the machine    Effective treatment of sleep apnea reduces the associated cardiovascular and cerebrovascular risks associated. In patients with elevated BMI, weight loss can be additionally beneficial. With use of positive pressure device; change of supplies per the permitted insurance schedule is necessary.    This patient is compliant with PAP machine and benefits from its use.  Apnea hypopneas index is corrected/improved.  Daytime hypersomnolence has resolved.     Patient will follow up in this clinic in 6 months APRN    Thank you for allowing me to participate in your patient's care.    Electronically signed by Holden Barone MD, 06/02/25, 12:04 PM EDT.    Part of this note may be an electronic transcription/translation of spoken language to printed text using the Dragon Dictation System.  "

## 2025-06-03 ENCOUNTER — APPOINTMENT (OUTPATIENT)
Dept: BONE DENSITY | Facility: HOSPITAL | Age: 75
End: 2025-06-03
Payer: MEDICARE

## 2025-06-03 ENCOUNTER — HOSPITAL ENCOUNTER (OUTPATIENT)
Dept: MAMMOGRAPHY | Facility: HOSPITAL | Age: 75
Discharge: HOME | End: 2025-06-03
Payer: MEDICARE

## 2025-06-03 DIAGNOSIS — Z78.0 ASYMPTOMATIC MENOPAUSAL STATE: ICD-10-CM

## 2025-06-03 DIAGNOSIS — Z12.31 SCREENING MAMMOGRAM FOR BREAST CANCER: ICD-10-CM

## 2025-06-03 PROCEDURE — 77080 DXA BONE DENSITY AXIAL: CPT

## 2025-06-03 PROCEDURE — 77063 BREAST TOMOSYNTHESIS BI: CPT

## 2025-06-03 PROCEDURE — 77067 SCR MAMMO BI INCL CAD: CPT

## 2025-06-03 PROCEDURE — 77067 SCR MAMMO BI INCL CAD: CPT | Performed by: RADIOLOGY

## 2025-06-03 PROCEDURE — 77063 BREAST TOMOSYNTHESIS BI: CPT | Performed by: RADIOLOGY

## 2025-06-23 ENCOUNTER — OFFICE VISIT (OUTPATIENT)
Dept: ORTHOPEDIC SURGERY | Facility: CLINIC | Age: 75
End: 2025-06-23
Payer: MEDICARE

## 2025-06-23 VITALS — HEIGHT: 62 IN | WEIGHT: 130 LBS | BODY MASS INDEX: 23.92 KG/M2

## 2025-06-23 DIAGNOSIS — M94.261 CHONDROMALACIA OF KNEE, RIGHT: Primary | ICD-10-CM

## 2025-06-23 DIAGNOSIS — M70.51 PES ANSERINUS BURSITIS OF RIGHT KNEE: ICD-10-CM

## 2025-06-23 RX ORDER — ZOLPIDEM TARTRATE 5 MG/1
TABLET ORAL
COMMUNITY
Start: 2025-04-25

## 2025-06-23 RX ADMIN — TRIAMCINOLONE ACETONIDE 80 MG: 40 INJECTION, SUSPENSION INTRA-ARTICULAR; INTRAMUSCULAR at 11:25

## 2025-06-23 RX ADMIN — LIDOCAINE HYDROCHLORIDE 2 ML: 10 INJECTION, SOLUTION EPIDURAL; INFILTRATION; INTRACAUDAL; PERINEURAL at 11:25

## 2025-06-23 NOTE — PROGRESS NOTES
Subjective:     Patient ID: Nikkie Kennedy is a 74 y.o. female.    Chief Complaint:  Follow-up right knee pain, chondromalacia    History of Present Illness  History of Present Illness  Nikkie returns clinic today follow-up evaluation regards to her right knee, had done very well with her last injection back in March but over the last 2 to 3 weeks she has noted significant increasing pain now localizing almost exclusively to her pes bursa over the medial proximal tibia.  She is not noticing the same pain that she is having to the joint space previously in March.  Rates as moderate intensity aching in nature.  Worse with prolonged walking weightbearing and getting up from a seated position as well as with local pressure.  Denies hip or groin pain.  Mild swelling does wax and wane.     Social History     Occupational History    Not on file   Tobacco Use    Smoking status: Never     Passive exposure: Never    Smokeless tobacco: Never   Vaping Use    Vaping status: Never Used   Substance and Sexual Activity    Alcohol use: No    Drug use: No    Sexual activity: Not Currently     Partners: Male     Birth control/protection: Post-menopausal     Comment:       Past Medical History:   Diagnosis Date    Abnormal vaginal Pap smear 09/10/1999    [place]    Abnormal vaginal Pap smear 11/06/2000    [facility]    Arthritis     Asthma     h/o, no inhaler    Disease of thyroid gland     hypothyroid    DJD (degenerative joint disease) of knee     sched TKA left    History of colposcopy 10/11/1999    chronic endocervicitis    History of UTI     Hole in the ear drum, right     d/t tube    Osteoporosis     Pulmonary embolism     Spinal headache     Tick bite     Pt on vent 18 days, hospitalized 34 days from illness from tick     Past Surgical History:   Procedure Laterality Date    BREAST CYST ASPIRATION Right     20 yrs ago    BREAST CYST INCISION AND DRAINAGE  12/1996, 1997    CHOLECYSTECTOMY  03/2016    COLONOSCOPY      COLONOSCOPY  "N/A 07/27/2023    Procedure: COLONOSCOPY;  Surgeon: Evangelista Brower MD;  Location:  [facility] OR;  Service: Gastroenterology;  Laterality: N/A;  Diverticulosis; External hemorrhoids    EAR TUBES      FOOT SURGERY      TOTAL KNEE ARTHROPLASTY Left 04/22/2019    Procedure: left total knee arthroplasty, possible placement of ON-Q pump, and all associated procedures;  Surgeon: Dhaval Quinn MD;  Location:  [facility] OR;  Service: Orthopedics    TUBAL ABDOMINAL LIGATION      VARICOSE VEIN SURGERY  2015    Pt states she had varicose veins removed on both legs (2-3 years ago).       Family History   Problem Relation Age of Onset    Cancer Mother     Colon cancer Mother 76    Clotting disorder Other         does not know name    Deep vein thrombosis Other     Breast cancer Neg Hx     Ovarian cancer Neg Hx     Malig Hyperthermia Neg Hx          Review of Systems        Objective:  Vitals:    06/23/25 1042   Weight: 59 kg (130 lb)   Height: 157.5 cm (62\")         06/23/25  1042   Weight: 59 kg (130 lb)     Body mass index is 23.78 kg/m².  Physical Exam    Vital signs reviewed.   General: No acute distress, alert and oriented  Eyes: conjunctiva clear; pupils equally round and reactive  ENT: external ears and nose atraumatic; oropharynx clear  CV: no peripheral edema  Resp: normal respiratory effort  Skin: no rashes or wounds; normal turgor  Psych: mood and affect appropriate; recent and remote memory intact          Physical Exam         Right knee-maximal tenderness palpation medial joint line, moderate tenderness medial and lateral patellar facet, active range of motion 2 to 125 degrees, 4+5 strength in flexion and extension, no effusion, stable to varus valgus stress at 0 30 degrees.  Positive active patellar compression test.    Imaging:  None today  Assessment:        1. Chondromalacia of knee, right    2. Pes anserinus bursitis of right knee           Plan:  Large Joint Arthrocentesis  Date/Time: 6/23/2025 11:25 " AM  Consent given by: patient  Site marked: site marked  Timeout: Immediately prior to procedure a time out was called to verify the correct patient, procedure, equipment, support staff and site/side marked as required   Supporting Documentation  Indications: pain   Procedure Details  Location: knee (pes bursa) -   Needle size: 22 G  Approach: anterolateral  Medications administered: 80 mg triamcinolone acetonide 40 MG/ML; 2 mL lidocaine PF 1% 1 %  Patient tolerance: patient tolerated the procedure well with no immediate complications              Assessment & Plan  Discussed treatment options with the patient including observation, therapy, advanced imaging.  Discontinue exercises.    Strengthening mobilization of the patient today.  She has elected proceed with injection today.  She declined options for advanced imaging.  Given localization of pain primarily over the pes bursa,  injection at that site      Patient would like to proceed with cortisone injection today to the right knee pes bursa. Recommended limited use of affected extremity for the next 24 hours to only essential activites other than work on general active and passive motion. Recommended supplementing with ice and soft tissue massage. Discussed with patient that they should see results in 5-7 days, if no improvement in 5-6 weeks I have asked them to call the office to review other options. Patient should call office immediately if they notice redness, warmth, fevers, chills, or residual numbness or tingling for greater than 6 hours after injection.     Nikkie Kennedy was in agreement with plan and had all questions answered.     Orders:  Orders Placed This Encounter   Procedures    Large Joint Arthrocentesis       Medications:  No orders of the defined types were placed in this encounter.      Followup:  Return in about 6 weeks (around 8/4/2025).    Diagnoses and all orders for this visit:    1. Chondromalacia of knee, right (Primary)    2. Pes  anserinus bursitis of right knee    Other orders  -     Large Joint Arthrocentesis          BMI is within normal parameters. No other follow-up for BMI required.       Dictated utilizing Dragon dictation     Patient or patient representative verbalized consent for the use of Ambient Listening during the visit with  Dhaval Quinn MD for chart documentation. 6/26/2025  10:59 EDT

## 2025-06-26 RX ORDER — LIDOCAINE HYDROCHLORIDE 10 MG/ML
2 INJECTION, SOLUTION EPIDURAL; INFILTRATION; INTRACAUDAL; PERINEURAL
Status: COMPLETED | OUTPATIENT
Start: 2025-06-23 | End: 2025-06-23

## 2025-06-26 RX ORDER — TRIAMCINOLONE ACETONIDE 40 MG/ML
80 INJECTION, SUSPENSION INTRA-ARTICULAR; INTRAMUSCULAR
Status: COMPLETED | OUTPATIENT
Start: 2025-06-23 | End: 2025-06-23

## 2025-08-18 ENCOUNTER — OFFICE VISIT (OUTPATIENT)
Dept: ORTHOPEDIC SURGERY | Facility: CLINIC | Age: 75
End: 2025-08-18
Payer: MEDICARE

## 2025-08-18 VITALS — HEIGHT: 62 IN | BODY MASS INDEX: 23.92 KG/M2 | WEIGHT: 130 LBS

## 2025-08-18 DIAGNOSIS — M94.261 CHONDROMALACIA OF KNEE, RIGHT: Primary | ICD-10-CM

## 2025-08-18 DIAGNOSIS — M70.51 PES ANSERINUS BURSITIS OF RIGHT KNEE: ICD-10-CM

## 2025-08-18 PROCEDURE — 99212 OFFICE O/P EST SF 10 MIN: CPT | Performed by: ORTHOPAEDIC SURGERY

## 2025-08-28 ENCOUNTER — TELEPHONE (OUTPATIENT)
Dept: ORTHOPEDIC SURGERY | Facility: CLINIC | Age: 75
End: 2025-08-28
Payer: MEDICARE

## (undated) DEVICE — Device

## (undated) DEVICE — HYBRID TUBING/CAP SET FOR OLYMPUS® SCOPES: Brand: ERBE

## (undated) DEVICE — DECANT BG O JET

## (undated) DEVICE — CONTAINER,SPECIMEN,OR STERILE,4OZ: Brand: MEDLINE

## (undated) DEVICE — ADAPT CLN BIOGUARD AIR/H2O DISP

## (undated) DEVICE — PAD,ABDOMINAL,8"X10",ST,LF: Brand: MEDLINE

## (undated) DEVICE — GLV SURG SENSICARE W/ALOE PF LF 7.5 STRL

## (undated) DEVICE — HOOD: Brand: FLYTE

## (undated) DEVICE — 10511 ROLL STOP SKIN MARKER; RULED CAP FINE TIP; W/ RULER: Brand: 10511 ROLL STOP SKIN MARKER; RULED CAP FINE TIP; W/ RULER

## (undated) DEVICE — DISPOSABLE TOURNIQUET CUFF SINGLE BLADDER, SINGLE PORT AND LUER LOCK CONNECTOR: Brand: COLOR CUFF

## (undated) DEVICE — KT ORCA ORCAPOD DISP STRL

## (undated) DEVICE — IMMOB KN 3PNL DLX CANVS 19IN BLU

## (undated) DEVICE — BW-412T DISP COMBO CLEANING BRUSH: Brand: SINGLE USE COMBINATION CLEANING BRUSH

## (undated) DEVICE — COLD THERAPY BLANKET: Brand: DEROYAL

## (undated) DEVICE — SCRW HEX PERSONA FML 2.5X25MM PK/2
Type: IMPLANTABLE DEVICE | Status: NON-FUNCTIONAL
Removed: 2019-04-22

## (undated) DEVICE — SYR LL 3CC

## (undated) DEVICE — SYS SKIN CLS DERMABOND PRINEO W/22CM MESH TP

## (undated) DEVICE — SUT VIC 2/0 CT1 CR8 18IN J839D

## (undated) DEVICE — GLV SURG SENSICARE MICRO PF LF 7.5 STRL

## (undated) DEVICE — LINER SURG CANSTR SXN S/RIGD 1500CC

## (undated) DEVICE — MEDI-VAC YANK SUCT HNDL W/TPRD BULBOUS TIP: Brand: CARDINAL HEALTH

## (undated) DEVICE — DRSNG TELFA PAD NONADH STR 1S 3X8IN

## (undated) DEVICE — SOL ISO/ALC RUB 70PCT 4OZ

## (undated) DEVICE — BOWL MIX QUICK VAC SNG

## (undated) DEVICE — MAT FLOOR QUICKWICK 56X28IN

## (undated) DEVICE — DRAPE,REIN 53X77,STERILE: Brand: MEDLINE

## (undated) DEVICE — GLV SURG SENSICARE ORTHO PF LF 7 STRL

## (undated) DEVICE — PK KN TOTL 90

## (undated) DEVICE — SUT VIC 0 CT1 36IN J946H

## (undated) DEVICE — WEBRIL* CAST PADDING: Brand: DEROYAL

## (undated) DEVICE — DUAL CUT SAGITTAL BLADE

## (undated) DEVICE — FRAZIER SURGICAL SUCTION INSTRUMENT: Brand: ARGYLE

## (undated) DEVICE — TOWEL,OR,DSP,ST,BLUE,STD,4/PK,20PK/CS: Brand: MEDLINE

## (undated) DEVICE — 3M™ STERI-DRAPE™ U-DRAPE 1015: Brand: STERI-DRAPE™

## (undated) DEVICE — SPNG LAP 18X18IN LF STRL PK/5

## (undated) DEVICE — SPNG GZ WOVN 4X4IN 12PLY 10/BX STRL

## (undated) DEVICE — SCRW HEX CORT HD 3.5X38MM
Type: IMPLANTABLE DEVICE | Site: KNEE | Status: NON-FUNCTIONAL
Removed: 2019-04-22

## (undated) DEVICE — GLV SURG NEOLON 2G PF LF 7.5 STRL

## (undated) DEVICE — INTENDED FOR TISSUE SEPARATION, AND OTHER PROCEDURES THAT REQUIRE A SHARP SURGICAL BLADE TO PUNCTURE OR CUT.: Brand: BARD-PARKER ® STAINLESS STEEL BLADES

## (undated) DEVICE — PIN DRL NOHEAD TROC 3.2X75MM BX/4

## (undated) DEVICE — BNDG ELAS ELITE V/CLOSE 6IN 5YD LF STRL

## (undated) DEVICE — APPL CHLORAPREP W/TINT 26ML ORNG